# Patient Record
Sex: FEMALE | Race: WHITE | Employment: FULL TIME | URBAN - METROPOLITAN AREA
[De-identification: names, ages, dates, MRNs, and addresses within clinical notes are randomized per-mention and may not be internally consistent; named-entity substitution may affect disease eponyms.]

---

## 2020-07-07 ENCOUNTER — OFFICE VISIT (OUTPATIENT)
Dept: ORTHOPEDIC SURGERY | Age: 50
End: 2020-07-07
Payer: COMMERCIAL

## 2020-07-07 VITALS
DIASTOLIC BLOOD PRESSURE: 84 MMHG | TEMPERATURE: 98.6 F | HEIGHT: 64 IN | BODY MASS INDEX: 38.93 KG/M2 | SYSTOLIC BLOOD PRESSURE: 130 MMHG | WEIGHT: 228 LBS | HEART RATE: 88 BPM

## 2020-07-07 PROCEDURE — 99203 OFFICE O/P NEW LOW 30 MIN: CPT | Performed by: ORTHOPAEDIC SURGERY

## 2020-07-07 RX ORDER — VIT C/B6/B5/MAGNESIUM/HERB 173 50-5-6-5MG
CAPSULE ORAL
COMMUNITY
End: 2021-02-26 | Stop reason: ALTCHOICE

## 2020-07-07 RX ORDER — CYCLOSPORINE 0.5 MG/ML
1 EMULSION OPHTHALMIC 2 TIMES DAILY
COMMUNITY

## 2020-07-07 RX ORDER — LISINOPRIL 20 MG/1
20 TABLET ORAL DAILY
COMMUNITY
End: 2020-08-07

## 2020-07-07 RX ORDER — LANOLIN ALCOHOL/MO/W.PET/CERES
1000 CREAM (GRAM) TOPICAL DAILY
COMMUNITY

## 2020-07-07 RX ORDER — GABAPENTIN 400 MG/1
400 CAPSULE ORAL 3 TIMES DAILY
COMMUNITY

## 2020-07-07 RX ORDER — DULOXETIN HYDROCHLORIDE 60 MG/1
60 CAPSULE, DELAYED RELEASE ORAL DAILY
COMMUNITY
End: 2020-08-07

## 2020-07-07 RX ORDER — ACYCLOVIR 400 MG/1
400 TABLET ORAL
COMMUNITY
End: 2020-08-07 | Stop reason: DRUGHIGH

## 2020-07-07 RX ORDER — FLUTICASONE PROPIONATE 50 MCG
1 SPRAY, SUSPENSION (ML) NASAL DAILY
Status: ON HOLD | COMMUNITY
End: 2020-08-11 | Stop reason: HOSPADM

## 2020-07-07 RX ORDER — TIZANIDINE 4 MG/1
4 TABLET ORAL EVERY 6 HOURS PRN
Status: ON HOLD | COMMUNITY
End: 2020-08-11 | Stop reason: SDUPTHER

## 2020-07-07 RX ORDER — ASPIRIN 81 MG/1
81 TABLET ORAL DAILY
Status: ON HOLD | COMMUNITY
End: 2020-08-11 | Stop reason: HOSPADM

## 2020-07-07 RX ORDER — MELOXICAM 15 MG/1
15 TABLET ORAL DAILY
Status: ON HOLD | COMMUNITY
End: 2021-03-02 | Stop reason: HOSPADM

## 2020-07-07 RX ORDER — CETIRIZINE HYDROCHLORIDE 10 MG/1
10 TABLET ORAL DAILY
COMMUNITY

## 2020-07-07 RX ORDER — OMEPRAZOLE 20 MG/1
20 CAPSULE, DELAYED RELEASE ORAL DAILY
COMMUNITY

## 2020-07-07 ASSESSMENT — ENCOUNTER SYMPTOMS: BACK PAIN: 1

## 2020-07-07 NOTE — PROGRESS NOTES
Patient Name: Clyde Partida  : 1970  DOS: 2020        Chief Complaint:   Chief Complaint   Patient presents with    New Patient     right hip       History of Present Illness:  Clyde Partida is a 45 y.o. female who presents with a chief complaint of right hip pain. Pain in the back with chronic use of stimulator. Previous injection and conservative management for the hip from 2 years ago to present. Medical History  Current Medications:   Prior to Admission medications    Medication Sig Start Date End Date Taking? Authorizing Provider   acyclovir (ZOVIRAX) 400 MG tablet Take 400 mg by mouth every 4 hours (while awake)   Yes Historical Provider, MD   vitamin B-12 (CYANOCOBALAMIN) 1000 MCG tablet Take 1,000 mcg by mouth daily   Yes Historical Provider, MD   gabapentin (NEURONTIN) 400 MG capsule Take 400 mg by mouth 3 times daily.    Yes Historical Provider, MD   tiZANidine (ZANAFLEX) 4 MG tablet Take 4 mg by mouth every 6 hours as needed   Yes Historical Provider, MD   lisinopril (PRINIVIL;ZESTRIL) 20 MG tablet Take 20 mg by mouth daily   Yes Historical Provider, MD   meloxicam (MOBIC) 15 MG tablet Take 15 mg by mouth daily   Yes Historical Provider, MD   omeprazole (PRILOSEC) 20 MG delayed release capsule Take 20 mg by mouth daily   Yes Historical Provider, MD   Turmeric 500 MG CAPS Take by mouth   Yes Historical Provider, MD   DULoxetine (CYMBALTA) 60 MG extended release capsule Take 60 mg by mouth daily   Yes Historical Provider, MD   cetirizine (ZYRTEC) 10 MG tablet Take 10 mg by mouth daily   Yes Historical Provider, MD   aspirin 81 MG EC tablet Take 81 mg by mouth daily   Yes Historical Provider, MD   Glucosamine-Chondroit-Vit C-Mn (GLUCOSAMINE 1500 COMPLEX PO) Take by mouth   Yes Historical Provider, MD   cycloSPORINE (RESTASIS) 0.05 % ophthalmic emulsion 1 drop 2 times daily   Yes Historical Provider, MD   fluticasone (FLONASE) 50 MCG/ACT nasal spray 1 spray by Each Nostril file    Stress: Not on file   Relationships    Social connections     Talks on phone: Not on file     Gets together: Not on file     Attends Nondenominational service: Not on file     Active member of club or organization: Not on file     Attends meetings of clubs or organizations: Not on file     Relationship status: Not on file    Intimate partner violence     Fear of current or ex partner: Not on file     Emotionally abused: Not on file     Physically abused: Not on file     Forced sexual activity: Not on file   Other Topics Concern    Not on file   Social History Narrative    Not on file     No family history on file. Review of Systems:   Review of Systems   Constitutional: Negative for chills and fever. HENT: Negative for nosebleeds. Eyes: Negative for double vision. Cardiovascular: Negative for chest pain. Gastrointestinal: Negative for abdominal pain. Musculoskeletal: Positive for joint pain and myalgias. Skin: Negative for rash. Neurological: Negative for seizures. Psychiatric/Behavioral: Negative for hallucinations. All other systems reviewed and negative     Assessment   Vital Signs:   /84   Pulse 88   Temp 98.6 °F (37 °C) (Infrared)   Ht 5' 4\" (1.626 m)   Wt 228 lb (103.4 kg)   BMI 39.14 kg/m²     Physical Exam   Constitutional: Patient is oriented to person, place, and time and well-developed, well-nourished, and in no distress. HENT:   Head: Normocephalic and atraumatic. Eyes: Pupils are equal, round, and reactive to light. Neck: No tracheal deviation present. No thyromegaly present. Pulmonary/Chest: Effort normal.   Abdominal: Soft. There is no guarding. Musculoskeletal: Patient exhibits tenderness and pain. Neurological: Patient is alert and oriented to person, place, and time. Skin: Skin is warm. Psychiatric: Affect normal.       Right Hip Examination    Inspection: There is no swelling or ecchymosis. There is no obvious deformity.     Palpation: There is tenderness over the greater trochanter. There is anterior groin tenderness. Range of Motion: NFDL508 ER10  IR-5    Strength: Hip flexors rated: 4/5. Special Tests: Trendelenberg sign: positive       Skin: There are no rashes, ulcerations or lesions. Gait: Patient walks with a limp. Skin shows no rashes/ecchymosis to the affected area, no hyperesthesias, no discoloration, no temperature or color discrepancies. NEUROLOGICALLY: There is no evidence for sensory or motor deficits in the extremity. Coordination appears full with no spacticity or rigidity. Reflexes appear to be symmetric. Distal circulation intact. No signs of RSD. Additional Comments:     Diagnostic Test Findings: right hip with djd and progressive wear. djd of the low back at L4-5 and L5-S1. Procedure(s): none    Assessment and Plan:  1. Primary osteoarthritis of right hip        No follow-ups on file. The orders below, if any, were placed during this visit:   Orders Placed This Encounter   Procedures    Ambulatory referral to Physical Therapy     Referral Priority:   Routine     Referral Type:   Eval and Treat     Requested Specialty:   Physical Therapy     Number of Visits Requested:   1       Impression:   Because he has such persistent pain and poor function currently, he does not wish to continue living in this way. Total Hip Arthroplasty has been recommended. All potential risks, the duration of hospitalization and rehab have been discussed. Surgery will be scheduled after medical clearance has been received. Plan for potential surgery once medically cleared.              Keira Meraz MD

## 2020-07-07 NOTE — PROGRESS NOTES
Review of Systems   Constitutional: Positive for unexpected weight change. HENT:        Eye or hearing impairment, sinus or throat trouble, thyroid disease    Cardiovascular:        High blood pressure    Genitourinary:        Loss of urine / incontinence    Musculoskeletal: Positive for back pain and neck pain. Neurological:        Chronic pain   Psychiatric/Behavioral:        Depression or other problems    All other systems reviewed and are negative.

## 2020-07-17 ENCOUNTER — TELEPHONE (OUTPATIENT)
Dept: ORTHOPEDIC SURGERY | Age: 50
End: 2020-07-17

## 2020-08-04 ENCOUNTER — TELEPHONE (OUTPATIENT)
Dept: ORTHOPEDIC SURGERY | Age: 50
End: 2020-08-04

## 2020-08-05 ENCOUNTER — OFFICE VISIT (OUTPATIENT)
Dept: PRIMARY CARE CLINIC | Age: 50
End: 2020-08-05

## 2020-08-05 ENCOUNTER — TELEPHONE (OUTPATIENT)
Dept: ORTHOPEDIC SURGERY | Age: 50
End: 2020-08-05

## 2020-08-05 NOTE — PATIENT INSTRUCTIONS

## 2020-08-05 NOTE — PROGRESS NOTES
Alexsander Connors received a viral test for COVID-19. They were educated on isolation and quarantine as appropriate. For any symptoms, they were directed to seek care from their PCP, given contact information to establish with a doctor, directed to an urgent care or the emergency room.

## 2020-08-05 NOTE — PROGRESS NOTES
order may or may not be in effect during this procedure. I give my doctor permission to give me blood or blood products. I understand that there are risks with receiving blood such as hepatitis, AIDS, fever, or allergic reaction. I acknowledge that the risks, benefits, and alternatives of this treatment have been explained to me and that no express or implied warranty has been given by the hospital, any blood bank, or any person or entity as to the blood or blood components transfused. At the discretion of my doctor, I agree to allow observers, equipment/product representatives and allow photographing, and/or televising of the procedure, provided my name or identity is maintained confidentially. I agree the hospital may dispose of or use for scientific or educational purposes any tissue, fluid, or body parts which may be removed.     ________________________________Date________Time______ am/pm  ([facility])  Patient or Signature of Closest Relative or Legal Guardian    ________________________________Date________Time______am/pm      Page 1 of  1  Witness

## 2020-08-06 ENCOUNTER — TELEPHONE (OUTPATIENT)
Dept: ORTHOPEDIC SURGERY | Age: 50
End: 2020-08-06

## 2020-08-06 LAB — SARS-COV-2, NAA: NOT DETECTED

## 2020-08-06 NOTE — RESULT ENCOUNTER NOTE
Your test for COVID-19, also known as novel coronavirus, came back negative. No virus was detected from the sample collected. Testing is not 100%. Until your symptoms are fully resolved, you may still be contagious. We recommend that you remain isolated for 7 days minimum or 72 hours after your symptoms have completely resolved, whichever is longer. If you were exposed to a known positive COVID-19 patient, then you must remain isolated for 14 days. If you were tested for a pre-op, then you remain in isolated until your procedure. Continually monitor symptoms. Contact a medical provider if symptoms are worsening. If you have any additional questions, contact your PCP.     For additional information, please visit the Centers for Disease Control and Prevention [employer].cy

## 2020-08-07 ENCOUNTER — ANESTHESIA EVENT (OUTPATIENT)
Dept: OPERATING ROOM | Age: 50
End: 2020-08-07
Payer: COMMERCIAL

## 2020-08-07 RX ORDER — NITROFURANTOIN 25; 75 MG/1; MG/1
100 CAPSULE ORAL 2 TIMES DAILY
COMMUNITY
End: 2021-02-26 | Stop reason: ALTCHOICE

## 2020-08-07 NOTE — PROGRESS NOTES
Snoring? Do you snore loudly (loud enough to be heard through closed doors, or your bed partner elbows you for snoring at night)? No    Tired? Do you often feel tired, fatigued, or sleepy during the daytime (such as falling asleep during driving)? No    Observed? Has anyone observed you stop breathing or choking/gasping during your sleep? No    Pressure? Do you have or are being treated for high blood pressure? Yes    Neck Size? (measured around Nates apple)  For male, is your shirt collar 17 inches or larger? For female, is your shirt collar 16 inches or larger? No    Age older than 48years old? Yes    Gender = Male  No    Body Mass Index more than 35 kg/m2?   Yes    Risk of RIVERA Scoring criteria:    [x] Low risk:  Yes to 0 - 2 questions    [] Intermediate risk:  Yes to 3 - 4 questions    [] High risk:  Yes to 5 - 8 questions

## 2020-08-07 NOTE — PROGRESS NOTES
[facility] PRE-SURGICAL TESTING INSTRUCTIONS                              PRIOR TO PROCEDURE DATE:  1. Please follow any guidelines/instructions prior to your procedure as advised by your surgeon. 2. Arrange for someone to drive you home and be with you for the first 24 hours after discharge for your safety after your procedure for which you received sedation. Ensure it is someone we can share information with regarding your discharge. 3. You must contact your surgeon for instructions IF:   You are taking any blood thinners, aspirin, anti-inflammatory or vitamin E.   There is a change in your physical condition such as a cold, fever, rash, cuts, sores or any other infection, especially near your surgical site. 4. Do not drink alcohol the day before or day of your procedure. 5. A Pre-op History and Physical for surgery MUST be completed by your Physician or Urgent Care within 30 days of your procedure date. Please bring a copy with you on the day of your procedure and along with any other testing performed. THE DAY OF YOUR PROCEDURE:  1. Follow instructions for ARRIVAL TIME as DIRECTED BY YOUR SURGEON. I    2. Enter the MAIN entrance from [employer] and follow the signs to the free [employer] or [employer] parking (offered free of charge 6am-5pm). 3. Enter the Main Entrance of the hospital (do not enter from the lower level of the parking garage). Upon entrance, check in with the  at the main desk on your left. If no one is available at the desk, proceed into the [facility] Waiting Room and go through the door directly into the [facility]. There is a Check-in desk ACROSS from Room 5 (marked with a sign hanging from the ceiling). The phone number for the surgery center is [phone]. 4. Please call [phone] option #2 option #2 if you have not been preregistered yet. On the day of your procedure bring your insurance card and photo ID.  You will be registered at your bedside once brought back to your room. 5. DO NOT EAT ANYTHING eight hours prior to surgery. May have 8 ounces of water 4 hours prior to surgery. 6. MEDICATIONS    Take the following medications with a SMALL sip of water: omeprazole   Use your usual dose of inhalers the morning of surgery. BRING your rescue inhaler with you to hospital.    Anesthesia does NOT want you to take insulin the morning of surgery. They will control your blood sugar while you are at the hospital. Please contact your ordering physician for instructions regarding your insulin the night before your procedure. If you have an insulin pump, please keep it set on basal rate. 7. Do not swallow water when brushing teeth. No gum, candy, mints or ice chips. Refrain from smoking or at least decrease the amount. 8. Dress in loose, comfortable clothing appropriate for redressing after your procedure. Do not wear jewelry (including body piercings), make-up (especially NO eye make-up), fingernail polish (NO toenail polish if foot/leg surgery), lotion, powders or metal hairclips. 9. Dentures, glasses, or contacts will need to be removed before your procedure. Bring cases for your glasses, contacts, dentures, or hearing aids to protect them while you are in surgery. 10. If you use a CPAP, please bring it with you on the day of your procedure. 11. We recommend that valuable personal  belongings such as cash, cell phones, e-tablets or jewelry, be left at home during your stay. The hospital will not be responsible for valuables that are not secured in the hospital safe. However, if your insurance requires a co-pay, you may want to bring a method of payment, i.e. Check or credit card, if you wish to pay your co-pay the day of surgery. 12. If you are to stay overnight, you may bring a bag with personal items.  Please have any large items you may need brought in by your family after your arrival to your hospital room.    13. If you have a Living Will or Durable Power of , please bring a copy on the day of your procedure. 15. With your permission, one family member may accompany you while you are being prepared for surgery. Once you are ready, additional family members may join you. HOW WE KEEP YOU SAFE and WORK TO PREVENT SURGICAL SITE INFECTIONS:  1. Health care workers should always check your ID bracelet to verify your name and birth date. You will be asked many times to state your name, date of birth, and allergies. 2. Health care workers should always clean their hands with soap or alcohol gel before providing care to you. It is okay to ask anyone if they cleaned their hands before they touch you. 3. You will be actively involved in verifying the type of procedure you are having and ensuring the correct surgical site. This will be confirmed multiple times prior to your procedure. Do NOT radha your surgery site UNLESS instructed to by your surgeon. 4. Do not shave or wax for 72 hours prior to procedure near your operative site. Shaving with a razor can irritate your skin and make it easier to develop an infection. On the day of your procedure, any hair that needs to be removed near the surgical site will be clipped by a healthcare worker using a special clippers designed to avoid skin irritation. 5. When you are in the operating room, your surgical site will be cleansed with a special soap, and in most cases, you will be given an antibiotic before the surgery begins. What to expect AFTER YOUR PROCEDURE:  1. Immediately following your procedure, your will be taken to the PACU for the first phase of your recovery. Your nurse will help you recover from any potential side effects of anesthesia, such as extreme drowsiness, changes in your vital signs or breathing patterns. Nausea, headache, muscle aches, or sore throat may also occur after anesthesia.   Your nurse will help you manage these potential side effects. 2. For comfort and safety, arrange to have someone at home with you for the first 24 hours after discharge. 3. You and your family will be given written instructions about your diet, activity, dressing care, medications, and return visits. 4. Once at home, should issues with nausea, pain, or bleeding occur, or should you notice any signs of infection, you should call your surgeon. 5. Always clean your hands before and after caring for your wound. Do not let your family touch your surgery site without cleaning their hands. 6. Narcotic pain medications can cause significant constipation. You may want to add a stool softener to your postoperative medication schedule or speak to your surgeon on how best to manage this SIDE EFFECT. SPECIAL INSTRUCTIONS   Thank you for allowing us to care for you. We strive to exceed your expectations in the delivery of care and service provided to you and your family. If you need to contact us for any reason, please call us at [phone]    Instructions reviewed with patient during preadmission testing phone interview. Tomeka Sanchez. 8/7/2020 .11:16 AM      ADDITIONAL EDUCATIONAL INFORMATION REVIEWED PER PHONE WITH YOU AND/OR YOUR FAMILY:  Yes Bring a urine sample on day of surgery  Hibiclens

## 2020-08-10 ENCOUNTER — APPOINTMENT (OUTPATIENT)
Dept: GENERAL RADIOLOGY | Age: 50
End: 2020-08-10
Attending: ORTHOPAEDIC SURGERY
Payer: COMMERCIAL

## 2020-08-10 ENCOUNTER — HOSPITAL ENCOUNTER (OUTPATIENT)
Age: 50
Setting detail: OBSERVATION
Discharge: HOME | End: 2020-08-11
Attending: ORTHOPAEDIC SURGERY | Admitting: ORTHOPAEDIC SURGERY
Payer: COMMERCIAL

## 2020-08-10 ENCOUNTER — ANESTHESIA (OUTPATIENT)
Dept: OPERATING ROOM | Age: 50
End: 2020-08-10
Payer: COMMERCIAL

## 2020-08-10 VITALS — DIASTOLIC BLOOD PRESSURE: 117 MMHG | TEMPERATURE: 70 F | SYSTOLIC BLOOD PRESSURE: 184 MMHG | OXYGEN SATURATION: 95 %

## 2020-08-10 PROBLEM — Z96.641 STATUS POST TOTAL HIP REPLACEMENT, RIGHT: Status: ACTIVE | Noted: 2020-08-10

## 2020-08-10 LAB
ABO/RH: NORMAL
ANTIBODY SCREEN: NORMAL
APTT: 34.4 SEC (ref 24.2–36.2)
BASOPHILS ABSOLUTE: 0.1 K/UL (ref 0–0.2)
BASOPHILS RELATIVE PERCENT: 1 %
C-REACTIVE PROTEIN: 6.4 MG/L (ref 0–5.1)
EOSINOPHILS ABSOLUTE: 0.1 K/UL (ref 0–0.6)
EOSINOPHILS RELATIVE PERCENT: 2 %
HCT VFR BLD CALC: 43.9 % (ref 36–48)
HEMOGLOBIN: 14.9 G/DL (ref 12–16)
LYMPHOCYTES ABSOLUTE: 1.4 K/UL (ref 1–5.1)
LYMPHOCYTES RELATIVE PERCENT: 22 %
MCH RBC QN AUTO: 32.7 PG (ref 26–34)
MCHC RBC AUTO-ENTMCNC: 33.9 G/DL (ref 31–36)
MCV RBC AUTO: 96.5 FL (ref 80–100)
MONOCYTES ABSOLUTE: 0.5 K/UL (ref 0–1.3)
MONOCYTES RELATIVE PERCENT: 8.2 %
NEUTROPHILS ABSOLUTE: 4.4 K/UL (ref 1.7–7.7)
NEUTROPHILS RELATIVE PERCENT: 66.8 %
PDW BLD-RTO: 14.9 % (ref 12.4–15.4)
PLATELET # BLD: 325 K/UL (ref 135–450)
PMV BLD AUTO: 8 FL (ref 5–10.5)
PREGNANCY, URINE: NEGATIVE
RBC # BLD: 4.54 M/UL (ref 4–5.2)
SEDIMENTATION RATE, ERYTHROCYTE: 32 MM/HR (ref 0–30)
WBC # BLD: 6.6 K/UL (ref 4–11)

## 2020-08-10 PROCEDURE — 86901 BLOOD TYPING SEROLOGIC RH(D): CPT

## 2020-08-10 PROCEDURE — 85652 RBC SED RATE AUTOMATED: CPT

## 2020-08-10 PROCEDURE — 85025 COMPLETE CBC W/AUTO DIFF WBC: CPT

## 2020-08-10 PROCEDURE — 6360000002 HC RX W HCPCS: Performed by: NURSE ANESTHETIST, CERTIFIED REGISTERED

## 2020-08-10 PROCEDURE — 7100000000 HC PACU RECOVERY - FIRST 15 MIN: Performed by: ORTHOPAEDIC SURGERY

## 2020-08-10 PROCEDURE — 6360000002 HC RX W HCPCS: Performed by: ANESTHESIOLOGY

## 2020-08-10 PROCEDURE — 86850 RBC ANTIBODY SCREEN: CPT

## 2020-08-10 PROCEDURE — 3700000001 HC ADD 15 MINUTES (ANESTHESIA): Performed by: ORTHOPAEDIC SURGERY

## 2020-08-10 PROCEDURE — 27130 TOTAL HIP ARTHROPLASTY: CPT | Performed by: ORTHOPAEDIC SURGERY

## 2020-08-10 PROCEDURE — 86900 BLOOD TYPING SEROLOGIC ABO: CPT

## 2020-08-10 PROCEDURE — 3700000000 HC ANESTHESIA ATTENDED CARE: Performed by: ORTHOPAEDIC SURGERY

## 2020-08-10 PROCEDURE — 7100000001 HC PACU RECOVERY - ADDTL 15 MIN: Performed by: ORTHOPAEDIC SURGERY

## 2020-08-10 PROCEDURE — 86140 C-REACTIVE PROTEIN: CPT

## 2020-08-10 PROCEDURE — 2500000003 HC RX 250 WO HCPCS: Performed by: NURSE ANESTHETIST, CERTIFIED REGISTERED

## 2020-08-10 PROCEDURE — 3600000005 HC SURGERY LEVEL 5 BASE: Performed by: ORTHOPAEDIC SURGERY

## 2020-08-10 PROCEDURE — 6370000000 HC RX 637 (ALT 250 FOR IP): Performed by: ORTHOPAEDIC SURGERY

## 2020-08-10 PROCEDURE — 2580000003 HC RX 258: Performed by: NURSE ANESTHETIST, CERTIFIED REGISTERED

## 2020-08-10 PROCEDURE — 6360000002 HC RX W HCPCS: Performed by: ORTHOPAEDIC SURGERY

## 2020-08-10 PROCEDURE — 2709999900 HC NON-CHARGEABLE SUPPLY: Performed by: ORTHOPAEDIC SURGERY

## 2020-08-10 PROCEDURE — 2580000003 HC RX 258: Performed by: ORTHOPAEDIC SURGERY

## 2020-08-10 PROCEDURE — 3209999900 FLUORO FOR SURGICAL PROCEDURES

## 2020-08-10 PROCEDURE — 2580000003 HC RX 258: Performed by: ANESTHESIOLOGY

## 2020-08-10 PROCEDURE — 84703 CHORIONIC GONADOTROPIN ASSAY: CPT

## 2020-08-10 PROCEDURE — 64450 NJX AA&/STRD OTHER PN/BRANCH: CPT | Performed by: ANESTHESIOLOGY

## 2020-08-10 PROCEDURE — G0378 HOSPITAL OBSERVATION PER HR: HCPCS

## 2020-08-10 PROCEDURE — 2720000010 HC SURG SUPPLY STERILE: Performed by: ORTHOPAEDIC SURGERY

## 2020-08-10 PROCEDURE — C1776 JOINT DEVICE (IMPLANTABLE): HCPCS | Performed by: ORTHOPAEDIC SURGERY

## 2020-08-10 PROCEDURE — 3600000015 HC SURGERY LEVEL 5 ADDTL 15MIN: Performed by: ORTHOPAEDIC SURGERY

## 2020-08-10 PROCEDURE — 2500000003 HC RX 250 WO HCPCS: Performed by: ORTHOPAEDIC SURGERY

## 2020-08-10 PROCEDURE — 2580000003 HC RX 258: Performed by: PHYSICIAN ASSISTANT

## 2020-08-10 PROCEDURE — 85730 THROMBOPLASTIN TIME PARTIAL: CPT

## 2020-08-10 PROCEDURE — 73502 X-RAY EXAM HIP UNI 2-3 VIEWS: CPT

## 2020-08-10 PROCEDURE — 6370000000 HC RX 637 (ALT 250 FOR IP): Performed by: PHYSICIAN ASSISTANT

## 2020-08-10 DEVICE — R3 3 HOLE ACETABULAR SHELL 50MM
Type: IMPLANTABLE DEVICE | Site: HIP | Status: FUNCTIONAL
Brand: R3 ACETABULAR

## 2020-08-10 DEVICE — R3 20 DEGREE XLPE ACETABULAR LINER                                    32MM INNER DIAMETER X OUTER DIAMETER 50MM
Type: IMPLANTABLE DEVICE | Site: HIP | Status: FUNCTIONAL
Brand: R3

## 2020-08-10 DEVICE — REFLECTION SPHERICAL HEAD SCREW 30MM
Type: IMPLANTABLE DEVICE | Site: HIP | Status: FUNCTIONAL
Brand: REFLECTION

## 2020-08-10 DEVICE — OXINIUM FEMORAL HEAD 12/14 TAPER                                    32MM +0
Type: IMPLANTABLE DEVICE | Site: HIP | Status: FUNCTIONAL
Brand: OXINIUM

## 2020-08-10 DEVICE — REFLECTION SPHERICAL HEAD SCREW 35MM
Type: IMPLANTABLE DEVICE | Site: HIP | Status: FUNCTIONAL
Brand: REFLECTION

## 2020-08-10 DEVICE — HIP H2 TOT ADV OTHER HD IMPL CAPPED H2 SN: Type: IMPLANTABLE DEVICE | Site: HIP | Status: FUNCTIONAL

## 2020-08-10 DEVICE — R3 20 DEGREE +4 XLPE ACETABULAR                                    LINER 32MM INNER DIAMETER X OUTER                                    DIAMETER 50MM
Type: IMPLANTABLE DEVICE | Site: HIP | Status: FUNCTIONAL
Brand: R3

## 2020-08-10 DEVICE — ANTHOLOGY HIGH OFFSET POROUS PLUS                                    HA SIZE 5
Type: IMPLANTABLE DEVICE | Site: HIP | Status: FUNCTIONAL
Brand: ANTHOLOGY

## 2020-08-10 RX ORDER — ACYCLOVIR 200 MG/1
400 CAPSULE ORAL DAILY
Status: DISCONTINUED | OUTPATIENT
Start: 2020-08-11 | End: 2020-08-11 | Stop reason: HOSPADM

## 2020-08-10 RX ORDER — HYDROCHLOROTHIAZIDE 25 MG/1
25 TABLET ORAL DAILY
Status: DISCONTINUED | OUTPATIENT
Start: 2020-08-11 | End: 2020-08-11 | Stop reason: HOSPADM

## 2020-08-10 RX ORDER — OXYCODONE HYDROCHLORIDE 5 MG/1
10 TABLET ORAL EVERY 4 HOURS PRN
Status: DISCONTINUED | OUTPATIENT
Start: 2020-08-10 | End: 2020-08-11 | Stop reason: HOSPADM

## 2020-08-10 RX ORDER — DEXAMETHASONE SODIUM PHOSPHATE 4 MG/ML
4 INJECTION, SOLUTION INTRA-ARTICULAR; INTRALESIONAL; INTRAMUSCULAR; INTRAVENOUS; SOFT TISSUE
Status: DISCONTINUED | OUTPATIENT
Start: 2020-08-10 | End: 2020-08-10 | Stop reason: HOSPADM

## 2020-08-10 RX ORDER — PREDNISONE 10 MG/1
10 TABLET ORAL DAILY
Status: DISCONTINUED | OUTPATIENT
Start: 2020-08-11 | End: 2020-08-11 | Stop reason: HOSPADM

## 2020-08-10 RX ORDER — NITROFURANTOIN 25; 75 MG/1; MG/1
100 CAPSULE ORAL 2 TIMES DAILY
Status: DISCONTINUED | OUTPATIENT
Start: 2020-08-10 | End: 2020-08-11 | Stop reason: HOSPADM

## 2020-08-10 RX ORDER — HYDROMORPHONE HCL 110MG/55ML
PATIENT CONTROLLED ANALGESIA SYRINGE INTRAVENOUS PRN
Status: DISCONTINUED | OUTPATIENT
Start: 2020-08-10 | End: 2020-08-10 | Stop reason: SDUPTHER

## 2020-08-10 RX ORDER — OXYCODONE HCL 10 MG/1
20 TABLET, FILM COATED, EXTENDED RELEASE ORAL ONCE
Status: COMPLETED | OUTPATIENT
Start: 2020-08-10 | End: 2020-08-10

## 2020-08-10 RX ORDER — TIZANIDINE 4 MG/1
4 TABLET ORAL EVERY 6 HOURS PRN
Status: DISCONTINUED | OUTPATIENT
Start: 2020-08-10 | End: 2020-08-11 | Stop reason: HOSPADM

## 2020-08-10 RX ORDER — GLYCOPYRROLATE 1 MG/5 ML
SYRINGE (ML) INTRAVENOUS PRN
Status: DISCONTINUED | OUTPATIENT
Start: 2020-08-10 | End: 2020-08-10 | Stop reason: SDUPTHER

## 2020-08-10 RX ORDER — ACETAMINOPHEN 500 MG
1000 TABLET ORAL ONCE
Status: COMPLETED | OUTPATIENT
Start: 2020-08-10 | End: 2020-08-10

## 2020-08-10 RX ORDER — VANCOMYCIN HYDROCHLORIDE 1 G/20ML
INJECTION, POWDER, LYOPHILIZED, FOR SOLUTION INTRAVENOUS PRN
Status: DISCONTINUED | OUTPATIENT
Start: 2020-08-10 | End: 2020-08-10 | Stop reason: HOSPADM

## 2020-08-10 RX ORDER — MAGNESIUM HYDROXIDE 1200 MG/15ML
LIQUID ORAL CONTINUOUS PRN
Status: COMPLETED | OUTPATIENT
Start: 2020-08-10 | End: 2020-08-10

## 2020-08-10 RX ORDER — SODIUM CHLORIDE, SODIUM LACTATE, POTASSIUM CHLORIDE, CALCIUM CHLORIDE 600; 310; 30; 20 MG/100ML; MG/100ML; MG/100ML; MG/100ML
INJECTION, SOLUTION INTRAVENOUS CONTINUOUS
Status: DISCONTINUED | OUTPATIENT
Start: 2020-08-10 | End: 2020-08-10

## 2020-08-10 RX ORDER — PROMETHAZINE HYDROCHLORIDE 12.5 MG/1
12.5 TABLET ORAL EVERY 6 HOURS PRN
Status: DISCONTINUED | OUTPATIENT
Start: 2020-08-10 | End: 2020-08-11 | Stop reason: HOSPADM

## 2020-08-10 RX ORDER — SODIUM CHLORIDE 0.9 % (FLUSH) 0.9 %
10 SYRINGE (ML) INJECTION PRN
Status: DISCONTINUED | OUTPATIENT
Start: 2020-08-10 | End: 2020-08-11 | Stop reason: HOSPADM

## 2020-08-10 RX ORDER — PREGABALIN 150 MG/1
150 CAPSULE ORAL ONCE
Status: COMPLETED | OUTPATIENT
Start: 2020-08-10 | End: 2020-08-10

## 2020-08-10 RX ORDER — LISINOPRIL AND HYDROCHLOROTHIAZIDE 25; 20 MG/1; MG/1
1 TABLET ORAL DAILY
Status: DISCONTINUED | OUTPATIENT
Start: 2020-08-11 | End: 2020-08-10 | Stop reason: SDUPTHER

## 2020-08-10 RX ORDER — ZOLPIDEM TARTRATE 5 MG/1
5 TABLET ORAL NIGHTLY PRN
Status: DISCONTINUED | OUTPATIENT
Start: 2020-08-10 | End: 2020-08-11 | Stop reason: HOSPADM

## 2020-08-10 RX ORDER — GABAPENTIN 400 MG/1
400 CAPSULE ORAL 3 TIMES DAILY
Status: DISCONTINUED | OUTPATIENT
Start: 2020-08-10 | End: 2020-08-11 | Stop reason: HOSPADM

## 2020-08-10 RX ORDER — POLYETHYLENE GLYCOL 3350 17 G/17G
17 POWDER, FOR SOLUTION ORAL DAILY
Status: DISCONTINUED | OUTPATIENT
Start: 2020-08-11 | End: 2020-08-11 | Stop reason: HOSPADM

## 2020-08-10 RX ORDER — ROPIVACAINE HYDROCHLORIDE 5 MG/ML
INJECTION, SOLUTION EPIDURAL; INFILTRATION; PERINEURAL
Status: DISCONTINUED | OUTPATIENT
Start: 2020-08-10 | End: 2020-08-10 | Stop reason: SDUPTHER

## 2020-08-10 RX ORDER — SODIUM CHLORIDE, SODIUM LACTATE, POTASSIUM CHLORIDE, CALCIUM CHLORIDE 600; 310; 30; 20 MG/100ML; MG/100ML; MG/100ML; MG/100ML
INJECTION, SOLUTION INTRAVENOUS CONTINUOUS PRN
Status: DISCONTINUED | OUTPATIENT
Start: 2020-08-10 | End: 2020-08-10 | Stop reason: SDUPTHER

## 2020-08-10 RX ORDER — ONDANSETRON 2 MG/ML
4 INJECTION INTRAMUSCULAR; INTRAVENOUS
Status: DISCONTINUED | OUTPATIENT
Start: 2020-08-10 | End: 2020-08-10 | Stop reason: HOSPADM

## 2020-08-10 RX ORDER — FENTANYL CITRATE 50 UG/ML
25 INJECTION, SOLUTION INTRAMUSCULAR; INTRAVENOUS EVERY 5 MIN PRN
Status: DISCONTINUED | OUTPATIENT
Start: 2020-08-10 | End: 2020-08-10 | Stop reason: HOSPADM

## 2020-08-10 RX ORDER — DEXAMETHASONE SODIUM PHOSPHATE 4 MG/ML
10 INJECTION, SOLUTION INTRA-ARTICULAR; INTRALESIONAL; INTRAMUSCULAR; INTRAVENOUS; SOFT TISSUE ONCE
Status: DISCONTINUED | OUTPATIENT
Start: 2020-08-10 | End: 2020-08-11 | Stop reason: HOSPADM

## 2020-08-10 RX ORDER — SODIUM CHLORIDE 0.9 % (FLUSH) 0.9 %
10 SYRINGE (ML) INJECTION EVERY 12 HOURS SCHEDULED
Status: DISCONTINUED | OUTPATIENT
Start: 2020-08-10 | End: 2020-08-11 | Stop reason: HOSPADM

## 2020-08-10 RX ORDER — LISINOPRIL 20 MG/1
20 TABLET ORAL DAILY
Status: DISCONTINUED | OUTPATIENT
Start: 2020-08-11 | End: 2020-08-11 | Stop reason: HOSPADM

## 2020-08-10 RX ORDER — OXYCODONE HYDROCHLORIDE 5 MG/1
5 TABLET ORAL EVERY 4 HOURS PRN
Status: DISCONTINUED | OUTPATIENT
Start: 2020-08-10 | End: 2020-08-11 | Stop reason: HOSPADM

## 2020-08-10 RX ORDER — LANOLIN ALCOHOL/MO/W.PET/CERES
1000 CREAM (GRAM) TOPICAL DAILY
Status: DISCONTINUED | OUTPATIENT
Start: 2020-08-11 | End: 2020-08-11 | Stop reason: HOSPADM

## 2020-08-10 RX ORDER — CETIRIZINE HYDROCHLORIDE 10 MG/1
10 TABLET ORAL DAILY
Status: DISCONTINUED | OUTPATIENT
Start: 2020-08-11 | End: 2020-08-11 | Stop reason: HOSPADM

## 2020-08-10 RX ORDER — DEXAMETHASONE SODIUM PHOSPHATE 4 MG/ML
INJECTION, SOLUTION INTRA-ARTICULAR; INTRALESIONAL; INTRAMUSCULAR; INTRAVENOUS; SOFT TISSUE PRN
Status: DISCONTINUED | OUTPATIENT
Start: 2020-08-10 | End: 2020-08-10 | Stop reason: SDUPTHER

## 2020-08-10 RX ORDER — PROPOFOL 10 MG/ML
INJECTION, EMULSION INTRAVENOUS PRN
Status: DISCONTINUED | OUTPATIENT
Start: 2020-08-10 | End: 2020-08-10 | Stop reason: SDUPTHER

## 2020-08-10 RX ORDER — ONDANSETRON 2 MG/ML
4 INJECTION INTRAMUSCULAR; INTRAVENOUS EVERY 6 HOURS PRN
Status: DISCONTINUED | OUTPATIENT
Start: 2020-08-10 | End: 2020-08-11 | Stop reason: HOSPADM

## 2020-08-10 RX ORDER — SODIUM CHLORIDE 450 MG/100ML
INJECTION, SOLUTION INTRAVENOUS CONTINUOUS
Status: DISCONTINUED | OUTPATIENT
Start: 2020-08-10 | End: 2020-08-11 | Stop reason: HOSPADM

## 2020-08-10 RX ORDER — DULOXETIN HYDROCHLORIDE 60 MG/1
60 CAPSULE, DELAYED RELEASE ORAL DAILY
Status: DISCONTINUED | OUTPATIENT
Start: 2020-08-11 | End: 2020-08-11 | Stop reason: HOSPADM

## 2020-08-10 RX ORDER — PANTOPRAZOLE SODIUM 40 MG/1
40 TABLET, DELAYED RELEASE ORAL
Status: DISCONTINUED | OUTPATIENT
Start: 2020-08-11 | End: 2020-08-11 | Stop reason: HOSPADM

## 2020-08-10 RX ORDER — FLUTICASONE PROPIONATE 50 MCG
1 SPRAY, SUSPENSION (ML) NASAL DAILY
Status: DISCONTINUED | OUTPATIENT
Start: 2020-08-11 | End: 2020-08-11 | Stop reason: HOSPADM

## 2020-08-10 RX ORDER — ACETAMINOPHEN 325 MG/1
650 TABLET ORAL EVERY 6 HOURS
Status: DISCONTINUED | OUTPATIENT
Start: 2020-08-10 | End: 2020-08-11 | Stop reason: HOSPADM

## 2020-08-10 RX ORDER — CELECOXIB 200 MG/1
400 CAPSULE ORAL ONCE
Status: COMPLETED | OUTPATIENT
Start: 2020-08-10 | End: 2020-08-10

## 2020-08-10 RX ORDER — ROPIVACAINE HYDROCHLORIDE 5 MG/ML
INJECTION, SOLUTION EPIDURAL; INFILTRATION; PERINEURAL
Status: COMPLETED
Start: 2020-08-10 | End: 2020-08-10

## 2020-08-10 RX ORDER — MELOXICAM 15 MG/1
15 TABLET ORAL DAILY
Status: DISCONTINUED | OUTPATIENT
Start: 2020-08-11 | End: 2020-08-11 | Stop reason: HOSPADM

## 2020-08-10 RX ORDER — SENNA AND DOCUSATE SODIUM 50; 8.6 MG/1; MG/1
1 TABLET, FILM COATED ORAL 2 TIMES DAILY
Status: DISCONTINUED | OUTPATIENT
Start: 2020-08-10 | End: 2020-08-11 | Stop reason: HOSPADM

## 2020-08-10 RX ORDER — ONDANSETRON 2 MG/ML
INJECTION INTRAMUSCULAR; INTRAVENOUS PRN
Status: DISCONTINUED | OUTPATIENT
Start: 2020-08-10 | End: 2020-08-10 | Stop reason: SDUPTHER

## 2020-08-10 RX ORDER — CYCLOSPORINE 0.5 MG/ML
1 EMULSION OPHTHALMIC 2 TIMES DAILY
Status: DISCONTINUED | OUTPATIENT
Start: 2020-08-10 | End: 2020-08-11 | Stop reason: HOSPADM

## 2020-08-10 RX ORDER — FENTANYL CITRATE 50 UG/ML
INJECTION, SOLUTION INTRAMUSCULAR; INTRAVENOUS PRN
Status: DISCONTINUED | OUTPATIENT
Start: 2020-08-10 | End: 2020-08-10 | Stop reason: SDUPTHER

## 2020-08-10 RX ORDER — ROCURONIUM BROMIDE 10 MG/ML
INJECTION, SOLUTION INTRAVENOUS PRN
Status: DISCONTINUED | OUTPATIENT
Start: 2020-08-10 | End: 2020-08-10 | Stop reason: SDUPTHER

## 2020-08-10 RX ORDER — LIDOCAINE HYDROCHLORIDE 20 MG/ML
INJECTION, SOLUTION INTRAVENOUS PRN
Status: DISCONTINUED | OUTPATIENT
Start: 2020-08-10 | End: 2020-08-10 | Stop reason: SDUPTHER

## 2020-08-10 RX ADMIN — DEXAMETHASONE SODIUM PHOSPHATE 4 MG: 4 INJECTION, SOLUTION INTRAMUSCULAR; INTRAVENOUS at 15:45

## 2020-08-10 RX ADMIN — ACETAMINOPHEN 1000 MG: 500 TABLET ORAL at 14:02

## 2020-08-10 RX ADMIN — HYDROMORPHONE HYDROCHLORIDE 0.5 MG: 2 INJECTION, SOLUTION INTRAMUSCULAR; INTRAVENOUS; SUBCUTANEOUS at 16:03

## 2020-08-10 RX ADMIN — SODIUM CHLORIDE, POTASSIUM CHLORIDE, SODIUM LACTATE AND CALCIUM CHLORIDE: 600; 310; 30; 20 INJECTION, SOLUTION INTRAVENOUS at 14:17

## 2020-08-10 RX ADMIN — OXYCODONE 5 MG: 5 TABLET ORAL at 23:01

## 2020-08-10 RX ADMIN — SODIUM CHLORIDE, SODIUM LACTATE, POTASSIUM CHLORIDE, AND CALCIUM CHLORIDE: 600; 310; 30; 20 INJECTION, SOLUTION INTRAVENOUS at 17:56

## 2020-08-10 RX ADMIN — PROPOFOL 150 MG: 10 INJECTION, EMULSION INTRAVENOUS at 15:34

## 2020-08-10 RX ADMIN — HYDROMORPHONE HYDROCHLORIDE 0.5 MG: 2 INJECTION, SOLUTION INTRAMUSCULAR; INTRAVENOUS; SUBCUTANEOUS at 16:23

## 2020-08-10 RX ADMIN — PROPOFOL 50 MG: 10 INJECTION, EMULSION INTRAVENOUS at 15:44

## 2020-08-10 RX ADMIN — SODIUM CHLORIDE, SODIUM LACTATE, POTASSIUM CHLORIDE, AND CALCIUM CHLORIDE: 600; 310; 30; 20 INJECTION, SOLUTION INTRAVENOUS at 16:11

## 2020-08-10 RX ADMIN — ROPIVACAINE HYDROCHLORIDE 30 ML: 5 INJECTION, SOLUTION EPIDURAL; INFILTRATION; PERINEURAL at 19:23

## 2020-08-10 RX ADMIN — HYDROMORPHONE HYDROCHLORIDE 0.5 MG: 2 INJECTION, SOLUTION INTRAMUSCULAR; INTRAVENOUS; SUBCUTANEOUS at 16:55

## 2020-08-10 RX ADMIN — ACETAMINOPHEN 650 MG: 325 TABLET ORAL at 22:58

## 2020-08-10 RX ADMIN — HYDROMORPHONE HYDROCHLORIDE 0.5 MG: 2 INJECTION, SOLUTION INTRAMUSCULAR; INTRAVENOUS; SUBCUTANEOUS at 17:42

## 2020-08-10 RX ADMIN — CEFAZOLIN 2 G: 10 INJECTION, POWDER, FOR SOLUTION INTRAVENOUS at 15:28

## 2020-08-10 RX ADMIN — ONDANSETRON 4 MG: 2 INJECTION INTRAMUSCULAR; INTRAVENOUS at 17:40

## 2020-08-10 RX ADMIN — SODIUM CHLORIDE, SODIUM LACTATE, POTASSIUM CHLORIDE, AND CALCIUM CHLORIDE: 600; 310; 30; 20 INJECTION, SOLUTION INTRAVENOUS at 15:27

## 2020-08-10 RX ADMIN — HYDROMORPHONE HYDROCHLORIDE 0.5 MG: 2 INJECTION, SOLUTION INTRAMUSCULAR; INTRAVENOUS; SUBCUTANEOUS at 16:11

## 2020-08-10 RX ADMIN — OXYCODONE HYDROCHLORIDE 20 MG: 10 TABLET, FILM COATED, EXTENDED RELEASE ORAL at 14:01

## 2020-08-10 RX ADMIN — SUGAMMADEX 100 MG: 100 INJECTION, SOLUTION INTRAVENOUS at 18:01

## 2020-08-10 RX ADMIN — FENTANYL CITRATE 50 MCG: 50 INJECTION INTRAMUSCULAR; INTRAVENOUS at 15:41

## 2020-08-10 RX ADMIN — NITROFURANTOIN (MONOHYDRATE/MACROCRYSTALS) 100 MG: 75; 25 CAPSULE ORAL at 22:58

## 2020-08-10 RX ADMIN — CELECOXIB 400 MG: 200 CAPSULE ORAL at 14:01

## 2020-08-10 RX ADMIN — LIDOCAINE HYDROCHLORIDE 100 MG: 20 INJECTION, SOLUTION INTRAVENOUS at 15:34

## 2020-08-10 RX ADMIN — GABAPENTIN 400 MG: 400 CAPSULE ORAL at 22:58

## 2020-08-10 RX ADMIN — Medication 0.3 MG: at 16:46

## 2020-08-10 RX ADMIN — DOCUSATE SODIUM 50MG AND SENNOSIDES 8.6MG 1 TABLET: 8.6; 5 TABLET, FILM COATED ORAL at 22:58

## 2020-08-10 RX ADMIN — CYCLOSPORINE 1 DROP: 0.5 EMULSION OPHTHALMIC at 23:12

## 2020-08-10 RX ADMIN — ROCURONIUM BROMIDE 50 MG: 10 INJECTION INTRAVENOUS at 15:35

## 2020-08-10 RX ADMIN — FENTANYL CITRATE 50 MCG: 50 INJECTION INTRAMUSCULAR; INTRAVENOUS at 15:34

## 2020-08-10 RX ADMIN — FENTANYL CITRATE 25 MCG: 50 INJECTION, SOLUTION INTRAMUSCULAR; INTRAVENOUS at 19:48

## 2020-08-10 RX ADMIN — SODIUM CHLORIDE: 4.5 INJECTION, SOLUTION INTRAVENOUS at 20:02

## 2020-08-10 RX ADMIN — HYDROMORPHONE HYDROCHLORIDE 0.5 MG: 2 INJECTION, SOLUTION INTRAMUSCULAR; INTRAVENOUS; SUBCUTANEOUS at 16:38

## 2020-08-10 RX ADMIN — PREGABALIN 150 MG: 150 CAPSULE ORAL at 14:01

## 2020-08-10 RX ADMIN — TRANEXAMIC ACID 1500 G: 1 INJECTION, SOLUTION INTRAVENOUS at 15:45

## 2020-08-10 RX ADMIN — ASPIRIN 325 MG: 325 TABLET, COATED ORAL at 22:58

## 2020-08-10 RX ADMIN — ROCURONIUM BROMIDE 50 MG: 10 INJECTION INTRAVENOUS at 16:55

## 2020-08-10 ASSESSMENT — PULMONARY FUNCTION TESTS
PIF_VALUE: 22
PIF_VALUE: 22
PIF_VALUE: 20
PIF_VALUE: 22
PIF_VALUE: 23
PIF_VALUE: 20
PIF_VALUE: 19
PIF_VALUE: 21
PIF_VALUE: 20
PIF_VALUE: 24
PIF_VALUE: 20
PIF_VALUE: 20
PIF_VALUE: 6
PIF_VALUE: 19
PIF_VALUE: 20
PIF_VALUE: 26
PIF_VALUE: 20
PIF_VALUE: 21
PIF_VALUE: 0
PIF_VALUE: 23
PIF_VALUE: 21
PIF_VALUE: 20
PIF_VALUE: 21
PIF_VALUE: 22
PIF_VALUE: 20
PIF_VALUE: 22
PIF_VALUE: 20
PIF_VALUE: 22
PIF_VALUE: 20
PIF_VALUE: 22
PIF_VALUE: 20
PIF_VALUE: 21
PIF_VALUE: 20
PIF_VALUE: 20
PIF_VALUE: 21
PIF_VALUE: 20
PIF_VALUE: 19
PIF_VALUE: 22
PIF_VALUE: 22
PIF_VALUE: 20
PIF_VALUE: 20
PIF_VALUE: 34
PIF_VALUE: 20
PIF_VALUE: 21
PIF_VALUE: 19
PIF_VALUE: 21
PIF_VALUE: 22
PIF_VALUE: 23
PIF_VALUE: 20
PIF_VALUE: 20
PIF_VALUE: 35
PIF_VALUE: 20
PIF_VALUE: 23
PIF_VALUE: 20
PIF_VALUE: 22
PIF_VALUE: 20
PIF_VALUE: 21
PIF_VALUE: 1
PIF_VALUE: 21
PIF_VALUE: 2
PIF_VALUE: 26
PIF_VALUE: 23
PIF_VALUE: 26
PIF_VALUE: 26
PIF_VALUE: 23
PIF_VALUE: 19
PIF_VALUE: 24
PIF_VALUE: 23
PIF_VALUE: 22
PIF_VALUE: 20
PIF_VALUE: 21
PIF_VALUE: 20
PIF_VALUE: 27
PIF_VALUE: 24
PIF_VALUE: 20
PIF_VALUE: 0
PIF_VALUE: 19
PIF_VALUE: 19
PIF_VALUE: 20
PIF_VALUE: 20
PIF_VALUE: 22
PIF_VALUE: 0
PIF_VALUE: 22
PIF_VALUE: 20
PIF_VALUE: 23
PIF_VALUE: 30
PIF_VALUE: 20
PIF_VALUE: 21
PIF_VALUE: 19
PIF_VALUE: 24
PIF_VALUE: 21
PIF_VALUE: 22
PIF_VALUE: 22
PIF_VALUE: 0
PIF_VALUE: 21
PIF_VALUE: 24
PIF_VALUE: 20
PIF_VALUE: 24
PIF_VALUE: 31
PIF_VALUE: 12
PIF_VALUE: 0
PIF_VALUE: 20
PIF_VALUE: 21
PIF_VALUE: 21
PIF_VALUE: 20
PIF_VALUE: 20
PIF_VALUE: 21
PIF_VALUE: 22
PIF_VALUE: 23
PIF_VALUE: 22
PIF_VALUE: 23
PIF_VALUE: 19
PIF_VALUE: 0
PIF_VALUE: 26
PIF_VALUE: 20
PIF_VALUE: 20
PIF_VALUE: 22
PIF_VALUE: 21
PIF_VALUE: 23
PIF_VALUE: 22
PIF_VALUE: 20
PIF_VALUE: 21
PIF_VALUE: 20
PIF_VALUE: 25
PIF_VALUE: 21
PIF_VALUE: 22
PIF_VALUE: 22
PIF_VALUE: 20
PIF_VALUE: 0
PIF_VALUE: 1
PIF_VALUE: 23
PIF_VALUE: 25
PIF_VALUE: 22
PIF_VALUE: 20
PIF_VALUE: 23
PIF_VALUE: 20
PIF_VALUE: 20
PIF_VALUE: 22
PIF_VALUE: 20
PIF_VALUE: 21
PIF_VALUE: 20
PIF_VALUE: 19
PIF_VALUE: 22
PIF_VALUE: 20
PIF_VALUE: 0
PIF_VALUE: 22
PIF_VALUE: 19
PIF_VALUE: 24
PIF_VALUE: 17
PIF_VALUE: 22
PIF_VALUE: 24
PIF_VALUE: 23

## 2020-08-10 ASSESSMENT — PAIN DESCRIPTION - DESCRIPTORS
DESCRIPTORS: DULL;ACHING
DESCRIPTORS: DULL;ACHING
DESCRIPTORS: DISCOMFORT
DESCRIPTORS: ACHING;DULL
DESCRIPTORS: DISCOMFORT
DESCRIPTORS: ACHING;BURNING

## 2020-08-10 ASSESSMENT — PAIN DESCRIPTION - ORIENTATION
ORIENTATION: RIGHT

## 2020-08-10 ASSESSMENT — PAIN DESCRIPTION - PROGRESSION
CLINICAL_PROGRESSION: NOT CHANGED
CLINICAL_PROGRESSION: NOT CHANGED

## 2020-08-10 ASSESSMENT — PAIN DESCRIPTION - LOCATION
LOCATION: HIP
LOCATION: INCISION;HIP
LOCATION: HIP
LOCATION: HIP;INCISION
LOCATION: INCISION;HIP

## 2020-08-10 ASSESSMENT — PAIN SCALES - GENERAL
PAINLEVEL_OUTOF10: 3
PAINLEVEL_OUTOF10: 9
PAINLEVEL_OUTOF10: 4
PAINLEVEL_OUTOF10: 3
PAINLEVEL_OUTOF10: 6
PAINLEVEL_OUTOF10: 1
PAINLEVEL_OUTOF10: 6

## 2020-08-10 ASSESSMENT — PAIN DESCRIPTION - FREQUENCY
FREQUENCY: CONTINUOUS

## 2020-08-10 ASSESSMENT — PAIN DESCRIPTION - PAIN TYPE
TYPE: SURGICAL PAIN
TYPE: ACUTE PAIN;SURGICAL PAIN
TYPE: SURGICAL PAIN

## 2020-08-10 ASSESSMENT — PAIN - FUNCTIONAL ASSESSMENT
PAIN_FUNCTIONAL_ASSESSMENT: ACTIVITIES ARE NOT PREVENTED
PAIN_FUNCTIONAL_ASSESSMENT: 0-10
PAIN_FUNCTIONAL_ASSESSMENT: ACTIVITIES ARE NOT PREVENTED

## 2020-08-10 ASSESSMENT — PAIN DESCRIPTION - ONSET
ONSET: ON-GOING
ONSET: ON-GOING

## 2020-08-10 NOTE — ANESTHESIA PROCEDURE NOTES
Peripheral Block    Patient location during procedure: post-op  Start time: 8/10/2020 7:10 PM  End time: 8/10/2020 7:23 PM  Staffing  Anesthesiologist: Steven How, DO  Performed: anesthesiologist   Preanesthetic Checklist  Completed: patient identified, site marked, surgical consent, pre-op evaluation, timeout performed, IV checked, risks and benefits discussed, monitors and equipment checked, anesthesia consent given, oxygen available and patient being monitored  Peripheral Block  Patient position: supine  Prep: ChloraPrep  Patient monitoring: cardiac monitor, continuous pulse ox, frequent blood pressure checks and IV access  Block type: Fascia iliaca  Laterality: right  Injection technique: single-shot  Procedures: ultrasound guided  Provider prep: mask and sterile gloves  Needle  Needle type: short-bevel   Needle gauge: 20 G  Needle length: 8 cm  Needle localization: ultrasound guidance  Test dose: negative  Assessment  Injection assessment: negative aspiration for heme, no paresthesia on injection and local visualized surrounding nerve on ultrasound  Hemodynamics: stable  Medications Administered  Ropivacaine (NAROPIN) injection 0.5%, 30 mL  Reason for block: procedure for pain, post-op pain management and at surgeon's request

## 2020-08-10 NOTE — FLOWSHEET NOTE
Patient received from the OR to pACU #5 post RIGHT TOTAL HIP ARTHROPLASTY ANTERIOR APPROACH - Right of Dr. Delfina Krueger. Placed on PACU monitoring equipment. Report given per CRNA. Per report, patient ran hypertensive but was stable intra op. On arrival, patient is arouseable, arrieta and states that surgical pain is 3/10, dull and tolerable.

## 2020-08-10 NOTE — BRIEF OP NOTE
Brief Postoperative Note      Patient: Franca Nichols  YOB: 1970  MRN: [id number]    Date of Procedure: 8/10/2020    Pre-Op Diagnosis: Osteoarthrosis, hip [M16.9]    Post-Op Diagnosis: Same       Procedure(s):  RIGHT TOTAL HIP ARTHROPLASTY ANTERIOR APPROACH    Surgeon(s):  MD Tania Styles MD    Assistant:  Surgical Assistant: Radene Maclachlan Holter    Anesthesia: General    Estimated Blood Loss (mL): 192     Complications: None    Specimens:   * No specimens in log *    Implants:  * No implants in log *      Drains: * No LDAs found *    Findings: Please refer to dictation for details    Electronically signed by Tania Higginbotham MD on 8/10/2020 at 5:52 PM

## 2020-08-10 NOTE — H&P
[facility]    [address] [place] [address] Same Day Surgery Update H & P  Department of General Surgery   Surgical Service   Pre-operative History and Physical  Last H & P within the last 30 days. DIAGNOSIS:   Osteoarthrosis, hip [M16.9]    PROCEDURE:  [place] TOTAL HIP ARTHROPLASTY [45691] (RIGHT TOTAL HIP ARTHROPLASTY ANTERIOR APPROACH)     HISTORY OF PRESENT ILLNESS:   Patient is a 45 y.o. female with chronic right hip pain and limited ROM in the setting of arthrosis. The symptoms have been recalcitrant to conservative treatment and the patient presents today for the above procedure. Covid 19:  Patient denies fever, chills, cough or known exposure to Covid-19.   Patient reports they have been quarantined at home since Covid-19 test.      Past Medical History:        Diagnosis Date    Arthritis     Hyperlipidemia     Hypertension     Thyroid disease     was hyperthyroid     Past Surgical History:        Procedure Laterality Date    COLONOSCOPY      FOOT SURGERY Bilateral     3 on each foot    OTHER SURGICAL HISTORY      Spinal stimulator left hip    TYMPANOSTOMY TUBE PLACEMENT Bilateral      Past Social History:  Social History     Socioeconomic History    Marital status:      Spouse name: None    Number of children: None    Years of education: None    Highest education level: None   Occupational History    None   Social Needs    Financial resource strain: None    Food insecurity     Worry: None     Inability: None    Transportation needs     Medical: None     Non-medical: None   Tobacco Use    Smoking status: Former Smoker     Last attempt to quit: 10/17/2018     Years since quittin.8    Smokeless tobacco: Never Used   Substance and Sexual Activity    Alcohol use: None     Comment: occassional    Drug use: Yes     Frequency: 7.0 times per week     Types: Marijuana     Comment: Gummies    Sexual activity: None   Lifestyle    Physical activity     Days per week: None Minutes per session: None    Stress: None   Relationships    Social connections     Talks on phone: None     Gets together: None     Attends Yarsanism service: None     Active member of club or organization: None     Attends meetings of clubs or organizations: None     Relationship status: None    Intimate partner violence     Fear of current or ex partner: None     Emotionally abused: None     Physically abused: None     Forced sexual activity: None   Other Topics Concern    None   Social History Narrative    None         Medications Prior to Admission:      Prior to Admission medications    Medication Sig Start Date End Date Taking? Authorizing Provider   nitrofurantoin, macrocrystal-monohydrate, (MACROBID) 100 MG capsule Take 100 mg by mouth 2 times daily   Yes Historical Provider, MD   gabapentin (NEURONTIN) 400 MG capsule Take 400 mg by mouth 3 times daily. Yes Historical Provider, MD   tiZANidine (ZANAFLEX) 4 MG tablet Take 4 mg by mouth every 6 hours as needed   Yes Historical Provider, MD   omeprazole (PRILOSEC) 20 MG delayed release capsule Take 20 mg by mouth daily   Yes Historical Provider, MD   cetirizine (ZYRTEC) 10 MG tablet Take 10 mg by mouth daily   Yes Historical Provider, MD   cycloSPORINE (RESTASIS) 0.05 % ophthalmic emulsion 1 drop 2 times daily   Yes Historical Provider, MD   fluticasone (FLONASE) 50 MCG/ACT nasal spray 1 spray by Each Nostril route daily   Yes Historical Provider, MD   lisinopril-hydrochlorothiazide (PRINZIDE;ZESTORETIC) 20-25 MG per tablet Take 1 tablet by mouth daily. Yes Historical Provider, MD   DULoxetine (CYMBALTA) 60 MG capsule Take 60 mg by mouth daily. Yes Historical Provider, MD   Polyethylene Glycol 3350 (MIRALAX PO) Take  by mouth.    Yes Historical Provider, MD   vitamin B-12 (CYANOCOBALAMIN) 1000 MCG tablet Take 1,000 mcg by mouth daily    Historical Provider, MD   meloxicam (MOBIC) 15 MG tablet Take 15 mg by mouth daily    Historical Provider, MD Turmeric 500 MG CAPS Take by mouth    Historical Provider, MD   aspirin 81 MG EC tablet Take 81 mg by mouth daily    Historical Provider, MD   acyclovir (ZOVIRAX) 200 MG capsule Take 800 mg by mouth daily. Historical Provider, MD   GLUCOSAMINE HCL-MSM PO Take  by mouth. Historical Provider, MD         Allergies:  Adhesive tape    PHYSICAL EXAM:      BP (!) 147/97   Pulse 82   Temp 98.9 °F (37.2 °C) (Oral)   Resp 16   Ht 5' 4\" (1.626 m)   Wt 230 lb (104.3 kg)   SpO2 97%   BMI 39.48 kg/m²      Airway:  Airway patent with no audible stridor    Heart:  Regular rate and rhythm, No murmur noted    Lungs:  No increased work of breathing, good air exchange, clear to auscultation bilaterally, no crackles or wheezing    Abdomen:  Soft, non-distended, non-tender, normal active bowel sounds, no masses palpated    ASSESSMENT AND PLAN    Patient is a 45 y.o. female with above specified procedure planned. 1.  Patient seen and focused exam done today- no new changes since last physical exam on 8/3/20    2. Access to ancillary services are available per request of the provider.     CORNELIO Macias - CNP     8/10/2020

## 2020-08-10 NOTE — ANESTHESIA PRE PROCEDURE
Department of Anesthesiology  Preprocedure Note       Name:  Juancarlos Houston   Age:  45 y.o.  :  1970                                          MRN:  [id number]         Date:  8/10/2020      Surgeon: Jennifer Gaines):  MD Brian Cole MD    Procedure: Procedure(s):  RIGHT TOTAL HIP ARTHROPLASTY ANTERIOR APPROACH    Medications prior to admission:   Prior to Admission medications    Medication Sig Start Date End Date Taking? Authorizing Provider   nitrofurantoin, macrocrystal-monohydrate, (MACROBID) 100 MG capsule Take 100 mg by mouth 2 times daily   Yes Historical Provider, MD   vitamin B-12 (CYANOCOBALAMIN) 1000 MCG tablet Take 1,000 mcg by mouth daily   Yes Historical Provider, MD   gabapentin (NEURONTIN) 400 MG capsule Take 400 mg by mouth 3 times daily. Yes Historical Provider, MD   tiZANidine (ZANAFLEX) 4 MG tablet Take 4 mg by mouth every 6 hours as needed   Yes Historical Provider, MD   meloxicam (MOBIC) 15 MG tablet Take 15 mg by mouth daily   Yes Historical Provider, MD   omeprazole (PRILOSEC) 20 MG delayed release capsule Take 20 mg by mouth daily   Yes Historical Provider, MD   Turmeric 500 MG CAPS Take by mouth   Yes Historical Provider, MD   cetirizine (ZYRTEC) 10 MG tablet Take 10 mg by mouth daily   Yes Historical Provider, MD   aspirin 81 MG EC tablet Take 81 mg by mouth daily   Yes Historical Provider, MD   cycloSPORINE (RESTASIS) 0.05 % ophthalmic emulsion 1 drop 2 times daily   Yes Historical Provider, MD   fluticasone (FLONASE) 50 MCG/ACT nasal spray 1 spray by Each Nostril route daily   Yes Historical Provider, MD   lisinopril-hydrochlorothiazide (PRINZIDE;ZESTORETIC) 20-25 MG per tablet Take 1 tablet by mouth daily. Yes Historical Provider, MD   acyclovir (ZOVIRAX) 200 MG capsule Take 800 mg by mouth daily. Yes Historical Provider, MD   DULoxetine (CYMBALTA) 60 MG capsule Take 60 mg by mouth daily.    Yes Historical Provider, MD   GLUCOSAMINE HCL-MSM PO Take  by Counseling given: Not Answered      Vital Signs (Current):   Vitals:    08/07/20 1108 08/10/20 1345   BP:  (!) 147/97   Pulse:  82   Resp:  16   Temp:  98.9 °F (37.2 °C)   TempSrc:  Oral   SpO2:  97%   Weight: 230 lb (104.3 kg) 230 lb (104.3 kg)   Height: 5' 4\" (1.626 m) 5' 4\" (1.626 m)                                              BP Readings from Last 3 Encounters:   08/10/20 (!) 147/97   07/07/20 130/84       NPO Status: Time of last liquid consumption: 1100                        Time of last solid consumption: 2000                        Date of last liquid consumption: 08/10/20                        Date of last solid food consumption: 08/09/20    BMI:   Wt Readings from Last 3 Encounters:   08/10/20 230 lb (104.3 kg)   07/07/20 228 lb (103.4 kg)     Body mass index is 39.48 kg/m². CBC:   Lab Results   Component Value Date    WBC 6.6 08/10/2020    RBC 4.54 08/10/2020    HGB 14.9 08/10/2020    HCT 43.9 08/10/2020    MCV 96.5 08/10/2020    RDW 14.9 08/10/2020     08/10/2020       CMP: No results found for: NA, K, CL, CO2, BUN, CREATININE, GFRAA, AGRATIO, LABGLOM, GLUCOSE, PROT, CALCIUM, BILITOT, ALKPHOS, AST, ALT    POC Tests: No results for input(s): POCGLU, POCNA, POCK, POCCL, POCBUN, POCHEMO, POCHCT in the last 72 hours.     Coags:   Lab Results   Component Value Date    APTT 34.4 08/10/2020       HCG (If Applicable):   Lab Results   Component Value Date    PREGTESTUR Negative 08/10/2020        ABGs: No results found for: PHART, PO2ART, LNT6RSL, ZUP3RPI, BEART, Y9SCBLQK     Type & Screen (If Applicable):  No results found for: LABABO, LABRH    Drug/Infectious Status (If Applicable):  No results found for: HIV, HEPCAB    COVID-19 Screening (If Applicable):   Lab Results   Component Value Date    COVID19 NOT DETECTED 08/05/2020         Anesthesia Evaluation  Patient summary reviewed and Nursing notes reviewed  Airway: Mallampati: II  TM distance: >3 FB   Neck ROM: full  Mouth opening: > = 3 FB Dental: Pulmonary:Negative Pulmonary ROS and normal exam  breath sounds clear to auscultation            Patient did not smoke on day of surgery. Cardiovascular:  Exercise tolerance: good (>4 METS),   (+) hypertension: mild,         Rhythm: regular  Rate: normal           Beta Blocker:  Not on Beta Blocker         Neuro/Psych:   Negative Neuro/Psych ROS              GI/Hepatic/Renal: Neg GI/Hepatic/Renal ROS            Endo/Other: Negative Endo/Other ROS                    Abdominal:       Abdomen: soft. Vascular: negative vascular ROS. Anesthesia Plan      general     ASA 2       Induction: intravenous. MIPS: Postoperative opioids intended and Prophylactic antiemetics administered. Anesthetic plan and risks discussed with patient. Use of blood products discussed with patient whom consented to blood products. Plan discussed with attending and CRNA.     Attending anesthesiologist reviewed and agrees with Pre Eval content              Franky Palafox DO   8/10/2020

## 2020-08-10 NOTE — OP NOTE
Operative Note      Patient: Rogelio Hernandez  YOB: 1970  MRN: [id number]    Date of Procedure: 8/10/2020    Pre-Op Diagnosis: Osteoarthrosis, hip [M16.9]    Post-Op Diagnosis: Same       Procedure(s):  RIGHT TOTAL HIP ARTHROPLASTY ANTERIOR APPROACH    Surgeon(s):  MD Vilma Bonilla MD    Assistant:   Surgical Assistant: Ledell Riles Holter    Anesthesia: General    Estimated Blood Loss (mL): 846     Complications: None    Specimens:   * No specimens in log *    Implants:  * No implants in log *      Drains: * No LDAs found *    Findings: The morbid obesity in this patient added significant time, effort and complexity creating additional intraoperative difficulty procedurally for this case and is considered an unusual procedural service for this surgery. Detailed Description of Procedure:   Direct Anterior Total Hip Operative Note    Patient: Rogelio Hernandez MRN: [id number]     YOB: 1970  Age: 45 y.o. Sex: female      Date of Operation: [unfilled]        Preoperative Diagnosis: End-stage osteoarthritis,  right hip. Postoperative Diagnosis: End-stage osteoarthritis, right hip. Procedure Performed: Direct anterior right total hip arthroplasty. Surgeon: Sabas Solorio  Assistant(s): Circulator: Ema Church RN; Arian Alfonso RN  Surgical Assistant: Ledell Riles Holter  Radiology Technologist: Kalia Mcadams Circulator: Deepti Yin RN  Scrub Person First: Brittany Sotomayor  Anesthesia: General  Estimated Blood Loss:  300 ml  Drains: None  Implants:  [employer]   Anthology stem , size 5 , high Offset ; 32mm 0 femoral head. 50 mm  R3 acetabulum with a 20 degree XLPE liner with offset with 2 dome screws. Indication For Operation: This is a 45 y.o. female with end-stage osteoarthritis of she right hip that failed to respond to prolonged nonoperative measures. They had no contraindications to surgery.  In the office treatment was thoroughly discussed including above procedure. The relative risks and benefits of direct anterior versus posterior approach of the right hip were discussed. The relative increase risk of iatrogenic fracture, cutaneous nerve injury with a direct anterior approach were discussed along with potential wound healing problems versus the relative risks of dislocation, leg length discrepancy with posterior approach and they elected an anterior approach at this time. They gave informed consent to proceed. Description of Procedure: The patient was taken to the operating room at [facility]. and a after satisfactory induction of general endotracheal anesthesia after preoperative regional block (fascia iliacus). The  righthip and lower extremity were then prepped and draped in the usual sterile fashion for a direct anterior approach. An approximately 10 cm longitudinal incision was made beginning 2 cm distal and posterior to the ASIS. Sharp dissection was carried down through the skin and subcutaneous tissue. The fascia over the fascia ajay was incised longitudinally in line with the skin incision. Kocher retractors were placed. The tensor fascia muscle was peeled off the medial wall of fascia developing the interval between it and the rectus femoris. Circumflex vessels were identified and cauterized and Cobra retractors were placed extra capsularly about the femoral neck. The capsule was opened in an inverted T fashion and tagged for later repair. Retractors were then placed intracapsularly and traction was applied. Proximal femoral resection was carried out a fingerbreadth proximal to the lesser trochanter in a napkin ring fashion. The femoral head was extracted using the power corkscrew and excellent direct visualization of the acetabulum was obtained. Retractor was placed anteriorly inferiorly and directly posteriorly as well as directly inferiorly allowing excellent circumferential exposure.  The labrum was excised along the soft tissue from the floor of the acetabulum. A curette was used to identify the true medial wall. Initial medialization was carried out and then sequential reaming was carried out with the hemispherical reamers. Final reaming was carried out to a size 49. A size 50 three hole R3 acetabular cup from [address] [place] [address] was placed on the insertion handle and impacted into place under direct fluoroscopic visualization to ensure appropriate anteversion and inclination. Excellent seating was obtained and good purchase was obtained with 2 supplemental screws. The 20 degree lip XLPE liner was placed in a posterior inferior position and good purchase was obtained. Meticulous injection of the posterior and inferior capsule was carried out with 60 cc of injection cocktail. Attention was then turned towards the femoral preparation. Posterior and inferior capsulotomies had been carried out under initial exposure following acetabular placement. The knee was fully extended to the floor in maximum adducted and externally rotated. Superior capsulotomy was carried out allowing good visualization. External rotation of the femur was carried out to a 140 degrees. Retractor was placed posterior to the greater trochanter and the femoral hook was placed to deliver the proximal femur into the wound. The canal was sounded with awl and a curette and maximal lateralization was carried out with the curette and the rongeuer. Sequential broaching was then carried out with the [facility] [place] and [employer] broach only system. Neutral varus orientation was maintained. The natural version of the proximal femur was followed. Trial reduction was carried out with the size 5 broach in place to assess leg lengths which appeared equal with a +0 mm head in place. This was improved with use of offset of the poly-ethelene and more superior position of the lip of the liner.   Shuck test was optimal. External rotation was stable beyond 100

## 2020-08-10 NOTE — FLOWSHEET NOTE
Dr. Martinez Favor at bedside to see patient regarding possible nerve block. Patient with episode of hypotension with BP into the 42'O systolic. States, \"feels funny, queasy\". Placed supine, IVF wide open. Dr. Martinez Favor at bedside and aware.

## 2020-08-11 VITALS
HEIGHT: 64 IN | DIASTOLIC BLOOD PRESSURE: 93 MMHG | OXYGEN SATURATION: 98 % | TEMPERATURE: 98 F | BODY MASS INDEX: 37.9 KG/M2 | SYSTOLIC BLOOD PRESSURE: 147 MMHG | WEIGHT: 222 LBS | RESPIRATION RATE: 18 BRPM | HEART RATE: 76 BPM

## 2020-08-11 LAB
ANION GAP SERPL CALCULATED.3IONS-SCNC: 10 MMOL/L (ref 3–16)
BUN BLDV-MCNC: 12 MG/DL (ref 7–20)
CALCIUM SERPL-MCNC: 8.6 MG/DL (ref 8.3–10.6)
CHLORIDE BLD-SCNC: 98 MMOL/L (ref 99–110)
CO2: 25 MMOL/L (ref 21–32)
CREAT SERPL-MCNC: 0.8 MG/DL (ref 0.6–1.1)
GFR AFRICAN AMERICAN: >60
GFR NON-AFRICAN AMERICAN: >60
GLUCOSE BLD-MCNC: 121 MG/DL (ref 70–99)
HCT VFR BLD CALC: 33.8 % (ref 36–48)
HEMOGLOBIN: 11.6 G/DL (ref 12–16)
MCH RBC QN AUTO: 32.5 PG (ref 26–34)
MCHC RBC AUTO-ENTMCNC: 34.2 G/DL (ref 31–36)
MCV RBC AUTO: 95.1 FL (ref 80–100)
PDW BLD-RTO: 14.3 % (ref 12.4–15.4)
PLATELET # BLD: 277 K/UL (ref 135–450)
PMV BLD AUTO: 8.3 FL (ref 5–10.5)
POTASSIUM SERPL-SCNC: 4.6 MMOL/L (ref 3.5–5.1)
RBC # BLD: 3.56 M/UL (ref 4–5.2)
SODIUM BLD-SCNC: 133 MMOL/L (ref 136–145)
WBC # BLD: 13.3 K/UL (ref 4–11)

## 2020-08-11 PROCEDURE — 85027 COMPLETE CBC AUTOMATED: CPT

## 2020-08-11 PROCEDURE — 80048 BASIC METABOLIC PNL TOTAL CA: CPT

## 2020-08-11 PROCEDURE — 97165 OT EVAL LOW COMPLEX 30 MIN: CPT

## 2020-08-11 PROCEDURE — 2580000003 HC RX 258: Performed by: PHYSICIAN ASSISTANT

## 2020-08-11 PROCEDURE — 97110 THERAPEUTIC EXERCISES: CPT

## 2020-08-11 PROCEDURE — 94640 AIRWAY INHALATION TREATMENT: CPT

## 2020-08-11 PROCEDURE — 99243 OFF/OP CNSLTJ NEW/EST LOW 30: CPT | Performed by: INTERNAL MEDICINE

## 2020-08-11 PROCEDURE — 6370000000 HC RX 637 (ALT 250 FOR IP): Performed by: PHYSICIAN ASSISTANT

## 2020-08-11 PROCEDURE — 94760 N-INVAS EAR/PLS OXIMETRY 1: CPT

## 2020-08-11 PROCEDURE — 97161 PT EVAL LOW COMPLEX 20 MIN: CPT

## 2020-08-11 PROCEDURE — 6360000002 HC RX W HCPCS: Performed by: PHYSICIAN ASSISTANT

## 2020-08-11 PROCEDURE — 97116 GAIT TRAINING THERAPY: CPT

## 2020-08-11 PROCEDURE — 99024 POSTOP FOLLOW-UP VISIT: CPT | Performed by: PHYSICIAN ASSISTANT

## 2020-08-11 PROCEDURE — G0378 HOSPITAL OBSERVATION PER HR: HCPCS

## 2020-08-11 PROCEDURE — 97530 THERAPEUTIC ACTIVITIES: CPT

## 2020-08-11 PROCEDURE — 36415 COLL VENOUS BLD VENIPUNCTURE: CPT

## 2020-08-11 RX ORDER — PREDNISONE 10 MG/1
10 TABLET ORAL DAILY
Qty: 10 TABLET | Refills: 0 | Status: SHIPPED | OUTPATIENT
Start: 2020-08-11 | End: 2020-08-21

## 2020-08-11 RX ORDER — CEFADROXIL 500 MG/1
500 CAPSULE ORAL 2 TIMES DAILY
Qty: 20 CAPSULE | Refills: 0 | Status: SHIPPED | OUTPATIENT
Start: 2020-08-11 | End: 2020-08-21

## 2020-08-11 RX ORDER — TIZANIDINE 4 MG/1
4 TABLET ORAL EVERY 8 HOURS PRN
Qty: 60 TABLET | Refills: 1 | Status: SHIPPED | OUTPATIENT
Start: 2020-08-11

## 2020-08-11 RX ORDER — OXYCODONE HYDROCHLORIDE AND ACETAMINOPHEN 5; 325 MG/1; MG/1
1 TABLET ORAL EVERY 6 HOURS PRN
Qty: 28 TABLET | Refills: 0 | Status: SHIPPED | OUTPATIENT
Start: 2020-08-11 | End: 2020-08-18

## 2020-08-11 RX ORDER — ZOLPIDEM TARTRATE 5 MG/1
5 TABLET ORAL NIGHTLY PRN
Qty: 14 TABLET | Refills: 0 | Status: SHIPPED | OUTPATIENT
Start: 2020-08-11 | End: 2020-08-25

## 2020-08-11 RX ADMIN — LISINOPRIL 20 MG: 20 TABLET ORAL at 08:35

## 2020-08-11 RX ADMIN — HYDROCHLOROTHIAZIDE 25 MG: 25 TABLET ORAL at 08:35

## 2020-08-11 RX ADMIN — ACETAMINOPHEN 650 MG: 325 TABLET ORAL at 10:58

## 2020-08-11 RX ADMIN — NITROFURANTOIN (MONOHYDRATE/MACROCRYSTALS) 100 MG: 75; 25 CAPSULE ORAL at 08:35

## 2020-08-11 RX ADMIN — GABAPENTIN 400 MG: 400 CAPSULE ORAL at 08:35

## 2020-08-11 RX ADMIN — PANTOPRAZOLE SODIUM 40 MG: 40 TABLET, DELAYED RELEASE ORAL at 06:15

## 2020-08-11 RX ADMIN — ACYCLOVIR 400 MG: 200 CAPSULE ORAL at 08:35

## 2020-08-11 RX ADMIN — MELOXICAM 15 MG: 15 TABLET ORAL at 08:35

## 2020-08-11 RX ADMIN — CEFAZOLIN 2 G: 10 INJECTION, POWDER, FOR SOLUTION INTRAVENOUS at 00:08

## 2020-08-11 RX ADMIN — DULOXETINE HYDROCHLORIDE 60 MG: 60 CAPSULE, DELAYED RELEASE ORAL at 08:35

## 2020-08-11 RX ADMIN — CEFAZOLIN 2 G: 10 INJECTION, POWDER, FOR SOLUTION INTRAVENOUS at 10:57

## 2020-08-11 RX ADMIN — CYANOCOBALAMIN TAB 1000 MCG 1000 MCG: 1000 TAB at 08:35

## 2020-08-11 RX ADMIN — PREDNISONE 10 MG: 10 TABLET ORAL at 08:35

## 2020-08-11 RX ADMIN — ASPIRIN 325 MG: 325 TABLET, COATED ORAL at 08:35

## 2020-08-11 RX ADMIN — ACETAMINOPHEN 650 MG: 325 TABLET ORAL at 06:15

## 2020-08-11 RX ADMIN — CETIRIZINE HYDROCHLORIDE 10 MG: 10 TABLET, FILM COATED ORAL at 08:35

## 2020-08-11 RX ADMIN — FLUTICASONE PROPIONATE 1 SPRAY: 50 SPRAY, METERED NASAL at 10:57

## 2020-08-11 RX ADMIN — OXYCODONE 5 MG: 5 TABLET ORAL at 07:23

## 2020-08-11 RX ADMIN — Medication 10 ML: at 11:06

## 2020-08-11 RX ADMIN — CYCLOSPORINE 1 DROP: 0.5 EMULSION OPHTHALMIC at 10:58

## 2020-08-11 RX ADMIN — DOCUSATE SODIUM 50MG AND SENNOSIDES 8.6MG 1 TABLET: 8.6; 5 TABLET, FILM COATED ORAL at 08:35

## 2020-08-11 RX ADMIN — OXYCODONE 5 MG: 5 TABLET ORAL at 12:53

## 2020-08-11 ASSESSMENT — PAIN SCALES - GENERAL
PAINLEVEL_OUTOF10: 5
PAINLEVEL_OUTOF10: 2
PAINLEVEL_OUTOF10: 1
PAINLEVEL_OUTOF10: 5
PAINLEVEL_OUTOF10: 2

## 2020-08-11 ASSESSMENT — PAIN DESCRIPTION - PROGRESSION: CLINICAL_PROGRESSION: NOT CHANGED

## 2020-08-11 NOTE — PROGRESS NOTES
Department of Orthopedic Surgery  Physician Assistant   Progress Note    Subjective:     Post-Operative Day: 1 Status Post right Total Hip Arthroplasty  Systemic or Specific Complaints:No Complaints    Objective:     Patient Vitals for the past 24 hrs:   BP Temp Temp src Pulse Resp SpO2 Height Weight   08/11/20 0715 128/76 98.4 °F (36.9 °C) Oral 60 16 96 % -- --   08/11/20 0315 (!) 149/89 98.8 °F (37.1 °C) Oral 76 16 97 % -- --   08/11/20 0030 -- -- -- -- 16 99 % -- --   08/11/20 0000 (!) 169/97 98 °F (36.7 °C) Oral 86 16 97 % -- --   08/10/20 2138 (!) 176/91 98.3 °F (36.8 °C) Oral 93 16 96 % 5' 4\" (1.626 m) 222 lb (100.7 kg)   08/10/20 2100 (!) 152/85 98.2 °F (36.8 °C) Temporal 95 22 96 % -- --   08/10/20 2045 (!) 154/78 -- -- 86 15 95 % -- --   08/10/20 2030 (!) 158/81 -- -- 80 14 96 % -- --   08/10/20 2015 (!) 173/83 -- -- 85 16 99 % -- --   08/10/20 2012 -- -- -- 85 -- -- -- --   08/10/20 2000 (!) 173/97 -- -- 82 18 100 % -- --   08/10/20 1945 (!) 177/90 -- -- 79 13 100 % -- --   08/10/20 1930 (!) 175/84 -- -- 88 16 100 % -- --   08/10/20 1915 (!) 169/109 -- -- 78 10 100 % -- --   08/10/20 1900 (!) 171/89 -- -- 81 15 99 % -- --   08/10/20 1845 (!) 158/86 -- -- 79 16 97 % -- --   08/10/20 1836 121/76 -- -- 73 11 100 % -- --   08/10/20 1833 (!) 78/52 -- -- 64 14 (!) 89 % -- --   08/10/20 1830 (!) 87/47 -- -- 64 13 97 % -- --   08/10/20 1820 (!) 154/97 -- -- 88 11 98 % -- --   08/10/20 1815 (!) 166/88 -- -- 85 8 98 % -- --   08/10/20 1812 (!) 164/87 98.3 °F (36.8 °C) Temporal 84 14 98 % -- --   08/10/20 1345 (!) 147/97 98.9 °F (37.2 °C) Oral 82 16 97 % 5' 4\" (1.626 m) 230 lb (104.3 kg)       General: alert, appears stated age and cooperative   Wound: Wound clean and dry no evidence of infection. , No Erythema, No Edema, No Drainage and Wound Intact   Motion: Painless Range of Motion   DVT Exam: No evidence of DVT seen on physical exam.  Negative Suman's sign. No cords or calf tenderness.   No significant

## 2020-08-11 NOTE — FLOWSHEET NOTE
PACU Transfer Note    Vitals:    08/10/20 2100   BP: (!) 152/85   Pulse: 95   Resp: 22   Temp: 98.2 °F (36.8 °C)   SpO2: 96%     BP within 20% of baseline value. In: 2950 [P.O.:400; I.V.:2550]  Out: 0     Pain assessment:  present - adequately treated  Pain Level: 3    Report given to Receiving unit RNAshutosh at bedside in the PACU previously while waiting for room to be cleaned. Transferred with all belongings including cell phone to ready room per PACU transport.     8/10/2020 9:23 PM

## 2020-08-11 NOTE — PLAN OF CARE
Problem: Falls - Risk of:  Goal: Will remain free from falls  Description: Will remain free from falls  8/11/2020 1053 by Verónica Lopez RN  Outcome: Ongoing  Note: Patient alert and oriented X4, non-skid socks on, bed in lowest position and locked, side rails up X2, call light and belongings within reach, bed alarm on for safety, and fall sign posted. Will continue to monitor.

## 2020-08-11 NOTE — PROGRESS NOTES
Pt was up to the bedside to attempt to go to the bathroom. Pt unable to walk due the block. Pt buckled x 3, so BSC was used. Pt was able to void. Will continue to monitor.

## 2020-08-11 NOTE — PROGRESS NOTES
Equipment: Crutches, Reacher  ADL Assistance: Independent  Homemaking Assistance: (shares with family)  Ambulation Assistance: Independent  Transfer Assistance: Independent  Active : Yes  Occupation: Full time employment  Type of occupation:   Additional Comments: Pt denies any recent falls. Objective        Orientation  Overall Orientation Status: Within Functional Limits     Standing Balance  Comment: Mod I to spvn for functional mobility with crutches during PT eval. Pt with no concerns related to transfers, including toilet (handicap height) and shower (walk-in with short threshold). Educated on safe technique with crutches - verb understanding.   ADL  LE Bathing: Other (Comment)(Educated on lower body bathing techniques both with and without AE, encouraged seated bathing; pt verb understanding, likely to obtain a shower chair and long handled sponge)  LE Dressing: Other (Comment)(Educated on LE dressing techniques both with and without reacher; pt verb understanding + will have assist of  as needed)  Additional Comments: Educated on anterior hip precautions as they relate to ADLs - pt verb understanding, is starting to commit them to memory and will keep \"cheat sheet\" handy initially     Plan  - D/C OT services       Therapy Time   Individual Concurrent Group Co-treatment   Time In 1036         Time Out 1046         Minutes 10          Timed Code Tx Min: 0  Total Tx Time: 10       Seema Dahl, OT

## 2020-08-11 NOTE — PROGRESS NOTES
Physical Therapy    Facility/Department: [facility] 5T ORTHO/NEURO  Initial Assessment/discharge note    NAME: Kentrell Barcenas  : 1970  MRN: [id number]    Date of Service: 2020    Discharge Recommendations:Yumiko Plasencia scored a 22/24 on the AM-PAC short mobility form. Current research shows that an AM-PAC score of 18 or greater is typically associated with a discharge to the patient's home setting. Based on the patient's AM-PAC score and their current functional mobility deficits, it is recommended that the patient have 2-3 sessions per week of Physical Therapy at d/c to increase the patient's independence. At this time, this patient demonstrates the endurance and safety to discharge home with  (home  services) and a follow up treatment frequency of 2-3x/wk. Please see assessment section for further patient specific details. .         PT Equipment Recommendations  Equipment Needed: (fit and issued one pair std crutches)    Assessment   Assessment: [facility] adnutted 8/10/20 for R THR. Pt demo good mobility and OK for d/c home from PT standpoint. Pt reports she will have 24hour supervision of spouse initially. Pt fit and issued 1 pr std crutches for home, no other DME needs. Pt plans to pursue home PT. No acute PT needs so will sign off. RN aware. Treatment Diagnosis: mobility impairment due to R THR  Decision Making: Low Complexity  Patient Education: Pt educated on WBAT R, anterior hip precautions, importance of OOB mobility and she verbalized understanding. REQUIRES PT FOLLOW UP: No  Activity Tolerance  Activity Tolerance: Patient Tolerated treatment well;Patient limited by pain       Patient Diagnosis(es): The encounter diagnosis was Status post total hip replacement, right.     has a past medical history of Arthritis, Hyperlipidemia, Hypertension, and Thyroid disease. has a past surgical history that includes Foot surgery (Bilateral); Tympanostomy tube placement (Bilateral);  Colonoscopy; other surgical history; and Total hip arthroplasty (Right, 8/10/2020). Restrictions  Position Activity Restriction  Other position/activity restrictions: up with assist, anterior hip precautions, WBAT, spinal block     Vision/Hearing  Vision: Within Functional Limits  Hearing: Within functional limits       Subjective  General  Chart Reviewed: Yes  Additional Pertinent Hx: 44 yo admitted 8/10/20 for R THR per Dr. Jessica Leos. Pmhx: HTN, OA. Family / Caregiver Present: No  Diagnosis: R THR  Follows Commands: Within Functional Limits  Subjective  Subjective: Pt found supine in bed and agreeable to PT. \" I'm going home today. \"  Pain Screening  Patient Currently in Pain: Yes(c/o R hip pain 4/10, RN aware)         Orientation  Orientation  Overall Orientation Status: Within Functional Limits     Social/Functional History  Social/Functional History  Lives With: Spouse(son and his family)  Type of Home: House  Home Layout: Bed/Bath upstairs  Home Access: (2 to enter without rail)  Bathroom Shower/Tub: Walk-in shower  Bathroom Toilet: Handicap height(sink nearby)  Home Equipment: Crutches, Reacher  ADL Assistance: Independent  Homemaking Assistance: (shares with family)  Ambulation Assistance: Independent  Transfer Assistance: Independent  Active : Yes  Occupation: Full time employment  Type of occupation:   Additional Comments: Pt denies any recent falls.        Objective                      Bed mobility  Supine to Sit: Independent  Sit to Supine: Minimal assistance(for R LE)     Transfers  Sit to Stand: Modified independent(x3 trials)  Stand to sit: Modified independent(x3 trials)     Ambulation  Device: Axillary Crutches  Assistance: Modified Independent  Quality of Gait: slow, steady gait with decreased stance time on R LE; WBAT R  Distance: 150 ft, 20 ft  Stairs/Curb  Stairs?: (up/down 3 steps with crutches superivison WBAT R)        Exercises  Comments: Pt demo THR HEP x10 with verbal/visual cues supervision except min assist for SLR.      Plan   Safety Devices  Type of devices: Nurse notified, Bed alarm in place, Left in bed, Call light within reach           AM-PAC Score  AM-PAC Inpatient Mobility Raw Score : 22 (08/11/20 1102)  AM-PAC Inpatient T-Scale Score : 53.28 (08/11/20 1102)  Mobility Inpatient CMS 0-100% Score: 20.91 (08/11/20 1102)  Mobility Inpatient CMS G-Code Modifier : CJ (08/11/20 1102)          Therapy Time   Individual Concurrent Group Co-treatment   Time In 0900         Time Out 1001         Minutes 61           Timed Code Treatment Minutes:51       Total Treatment Minutes:  61       Brady Murray, PT

## 2020-08-11 NOTE — PLAN OF CARE
Problem: Pain:  Goal: Pain level will decrease  Outcome: Ongoing   Medicated pt per orders, please see e-Mar. Encourage pt to call if pain increases. Will continue to monitor. Problem: Falls - Risk of:  Goal: Will remain free from falls  Outcome: Ongoing   Pt remains free from injury during this shift. Pt is up with assistance x 2 person. Encourage pt to call for all assistance. Call light is in reach, bed alarm is activated for safety, bed locked and in lowest position. Will continue monitor.

## 2020-08-11 NOTE — PROGRESS NOTES
Clinical Pharmacy Note:    Glucosamine ordered for patient from home medication list. Per [facility] policy, herbals are not to be administered in the hospital.  Will discontinue order. May resume upon discharge. Please call pharmacy with any questions. Thanks!     Demetria Jamil PharmD  [phone]

## 2020-08-11 NOTE — CONSULTS
[facility] [address]                                  CONSULTATION    PATIENT NAME: Mia Dobbins                   :        1970  MED REC NO:   [id number]                          ROOM:       [id number]  ACCOUNT NO:   [de-identified]                           ADMIT DATE: 08/10/2020  PROVIDER:     Arlyn Holder MD    CONSULT DATE:  2020    INTERNAL MEDICINE CONSULTATION    HISTORY OF PRESENT ILLNESS:  This patient is a 62-year-old white female  with a history of hypertension and hyperlipidemia as well as DJD and  thyroid disease, who is admitted for a total hip replacement. Postoperatively, she denies any chest pain, shortness of breath or any  other problems. PRESENT MEDICATIONS:  As per the med sheet. SOCIAL HISTORY:  She is a . Does not smoke excessively. Does not drink excessively. She was a former smoker, quit in 2018. PHYSICAL EXAMINATION:  GENERAL:  Currently, she is in no acute distress. VITAL SIGNS:  Blood pressure is 130/80, pulse rate 80, respiratory rate  12 and easy. HEENT:  Head:  Atraumatic, normocephalic. Eyes:  Sclerae noninjected. NECK:  No jugular venous distension. No adenopathy. LUNGS:  Clear. HEART:  Reveals normal S1 and S2 without any murmurs or gallops. ABDOMEN:  Soft without tenderness. EXTREMITIES:  Reveal evidence of total joint replacement. LABORATORY DATA:  Labs preoperatively were not available. Postoperatively, her sodium is slightly low at 133, glucose is 121  nonfasting. Hemoglobin and hematocrit are 11.6 and 33.8, white count  13.3, and platelets 382. Prior to surgery, her hemoglobin was 14.9 and  43.8 hematocrit, white count 6.6, and platelets 307. EKG not available. IMPRESSION:  1. Status post total hip replacement. 2.  History of thyroid disease, clinically stable. 3.  History of hypertension, stable.   4.  History of hyperlipidemia, stable. PLAN:  1. DVT prophylaxis. Physical therapy. Early ambulation. 2.  Continue home meds. 3.  Further therapy pending above. If the patient is to be discharged,  she is to follow up with her primary care physician in two to four  weeks. She remains medically stable at present.         Deanna Johnson MD    D: 08/11/2020 8:13:56       T: 08/11/2020 8:57:05     [email or web address]  Job#: [id number]     Doc#: [id number]    CC:

## 2020-08-11 NOTE — CARE COORDINATION
Case Management Assessment            Discharge Note                    Date / Time of Note: 8/11/2020 7:12 PM                  Discharge Note Completed by: Tanya Holley     Spoke with patient while rounding with Jo Trotter RN and patient will d/c to home and has agreed to home care with [facility]. Referral made with [facility] to provide home care. Patient has no DME needs and spouse will transport to home. Patient Name: Shawnee Rossi   YOB: 1970  Diagnosis: Osteoarthrosis, hip [M16.9]  Status post total hip replacement, right [Z96.641]   Date / Time: 8/10/2020  1:35 PM    Current PCP: [address] patient: No    Hospitalization in the last 30 days: No    Advance Directives:  Code Status: Prior  [place] DNR form completed and on chart: No    Financial:  Payor: Nancy Mabry / Plan: [employer] PPO / Product Type: *No Product type* /      Pharmacy:    [facility]/pharmacy #[id number] [address]  Phone: [phone] Fax: [phone]      Assistance purchasing medications?: Potential Assistance Purchasing Medications: No  Assistance provided by Case Management: None at this time    Does patient want to participate in local refill/ meds to beds program?: Yes    Meds To Beds General Rules:  1. Can ONLY be done Monday- Friday between 8:30am-5pm  2. Prescription(s) must be in pharmacy by 3pm to be filled same day  3. Copy of patient's insurance/ prescription drug card and patient face sheet must be sent along with the prescription(s)  4. Cost of Rx cannot be added to hospital bill. If financial assistance is needed, please contact unit  or ;  or  CANNOT provide pharmacy voucher for patients co-pays  5.  Patients can then  the prescription on their way out of the hospital at discharge, or pharmacy can deliver to the bedside if staff is available. (payment due at time of pick-up or delivery - cash, check, or card accepted)     Able to afford home medications/ co-pay costs: Yes    ADLS:  Current PT AM-PAC Score: 22 /24  Current OT AM-PAC Score:   /24      DISCHARGE Disposition: Home with [address] [email or web address] [address]: [address]     LOC at discharge: Not Applicable  GAB Completed: Not Indicated    Notification completed in HENS/PAS?:  Not Applicable    IMM Completed:   Not Indicated    Transportation:  Transportation PLAN for discharge: family   Mode of Transport: [address]:  [address] ordered at discharge: Yes  [address]: [facility]  Phone: [phone]  Fax: [phone]  Orders faxed: No-they are able to pull orders      The Plan for Transition of Care is related to the following treatment goals of Osteoarthrosis, hip [M16.9]  Status post total hip replacement, right [L53.447]    The Patient and/or patient representative Jose De Jesus Burton and her family were provided with a choice of provider and agrees with the discharge plan Yes    Freedom of choice list was provided with basic dialogue that supports the patient's individualized plan of care/goals and shares the quality data associated with the providers.  Yes    Care Transitions patient: No    Noel Morris RN  [facility] [employer]  Case Management Department  Ph: [phone]  Fax: [phone]

## 2020-08-11 NOTE — PROGRESS NOTES
4 Eyes Admission Assessment     I agree as the admission nurse that 2 RN's have performed a thorough Head to Toe Skin Assessment on the patient. ALL assessment sites listed below have been assessed on admission. Areas assessed by both nurses:   [x]   Head, Face, and Ears   [x]   Shoulders, Back, and Chest  [x]   Arms, Elbows, and Hands   [x]   Coccyx, Sacrum, and Ischum  [x]   Legs, Feet, and Heels        Does the Patient have Skin Breakdown?   No         Lázaro Prevention initiated:  Yes   Wound Care Orders initiated:  No      [facility] nurse consulted for Pressure Injury (Stage 3,4, Unstageable, DTI, NWPT, and Complex wounds):  No      Nurse 1 eSignature: Electronically signed by Radha Aaron RN on 8/10/20 at 11:52 PM EDT    **SHARE this note so that the co-signing nurse is able to place an eSignature**    Nurse 2 eSignature: Electronically signed by Noah Reyez RN on 8/11/20 at 6:07 AM EDT

## 2020-08-11 NOTE — DISCHARGE SUMMARY
Department of Orthopedic Surgery  Physician Assistant   Discharge Summary    The Adriana Thomason is a 45 y.o. female admitted for right hip osteoarthritis. Adriana Thomason was admitted to the floor following Her recovery in the PACU.      Discharge Diagnosis  right right hip total arthroplasty  Acute postoperative anemia    Current Inpatient Medications    Current Facility-Administered Medications: Dexamethasone Sodium Phosphate injection 10 mg, 10 mg, Intravenous, Once  ortho mix (with morphine) injection, , Injection, On Call  acyclovir (ZOVIRAX) capsule 400 mg, 400 mg, Oral, Daily  cetirizine (ZYRTEC) tablet 10 mg, 10 mg, Oral, Daily  cycloSPORINE (RESTASIS) 0.05 % ophthalmic emulsion 1 drop, 1 drop, Both Eyes, BID  DULoxetine (CYMBALTA) extended release capsule 60 mg, 60 mg, Oral, Daily  fluticasone (FLONASE) 50 MCG/ACT nasal spray 1 spray, 1 spray, Each Nostril, Daily  gabapentin (NEURONTIN) capsule 400 mg, 400 mg, Oral, TID  meloxicam (MOBIC) tablet 15 mg, 15 mg, Oral, Daily  nitrofurantoin (macrocrystal-monohydrate) (MACROBID) capsule 100 mg, 100 mg, Oral, BID  pantoprazole (PROTONIX) tablet 40 mg, 40 mg, Oral, QAM AC  polyethylene glycol (GLYCOLAX) packet 17 g, 17 g, Oral, Daily  tiZANidine (ZANAFLEX) tablet 4 mg, 4 mg, Oral, Q6H PRN  vitamin B-12 (CYANOCOBALAMIN) tablet 1,000 mcg, 1,000 mcg, Oral, Daily  predniSONE (DELTASONE) tablet 10 mg, 10 mg, Oral, Daily  zolpidem (AMBIEN) tablet 5 mg, 5 mg, Oral, Nightly PRN  sodium chloride flush 0.9 % injection 10 mL, 10 mL, Intravenous, 2 times per day  sodium chloride flush 0.9 % injection 10 mL, 10 mL, Intravenous, PRN  acetaminophen (TYLENOL) tablet 650 mg, 650 mg, Oral, Q6H  sennosides-docusate sodium (SENOKOT-S) 8.6-50 MG tablet 1 tablet, 1 tablet, Oral, BID  0.45 % sodium chloride infusion, , Intravenous, Continuous  ceFAZolin (ANCEF) 2 g in dextrose 5 % 50 mL IVPB, 2 g, Intravenous, Q8H  oxyCODONE (ROXICODONE) immediate release tablet 5 mg, 5 mg, Oral, Q4H PRN **OR** oxyCODONE (ROXICODONE) immediate release tablet 10 mg, 10 mg, Oral, Q4H PRN  HYDROmorphone (DILAUDID) injection 0.25 mg, 0.25 mg, Intravenous, Q3H PRN **OR** HYDROmorphone (DILAUDID) injection 0.5 mg, 0.5 mg, Intravenous, Q3H PRN  bisacodyl (DULCOLAX) EC tablet 5 mg, 5 mg, Oral, Daily PRN  promethazine (PHENERGAN) tablet 12.5 mg, 12.5 mg, Oral, Q6H PRN **OR** ondansetron (ZOFRAN) injection 4 mg, 4 mg, Intravenous, Q6H PRN  aspirin EC tablet 325 mg, 325 mg, Oral, BID  lisinopril (PRINIVIL;ZESTRIL) tablet 20 mg, 20 mg, Oral, Daily  hydroCHLOROthiazide (HYDRODIURIL) tablet 25 mg, 25 mg, Oral, Daily    Post-operatively the patients diet was advanced as tolerated and their dressing was changed on POD #1. The incision is dressing in place, clean, dry and intact with no signs of infection. The patient remained neurovascularly intact in the right lower and had intact pulses distally. Patients calf remained soft and showed no evidence of DVT. The patient was able to move their right lower extremity without any problems post-operatively. Physical therapy and occupational therapy were consulted and began working with the patient post-operatively. The patient progressed with PT/OT as would be expected and continued to improve through their stay. The patients pain was initially controlled with IV medications but we were able to transition to oral pain medications soon after arrival to the floor and their pain remained under good control through their hospital stay. From a medical standpoint the patient remained stable and continued to have the medicine team follow throughout their stay. The patient will be discharged at this time to Home  with their current diet restrictions and will continue to follow the precautions outlined to them by us and PT/OT. Condition on Discharge: Stable    Plan  Return visit in 10 days. .  Patient was instructed on the use of pain medications, the signs and symptoms of infection, and was given our number to call should they have any questions or concerns following discharge.   We will continue to monitor the anemia with laboratory evaluation and prenatal vitamin supplementation    Electronically signed by ERICA Crockett on 8/11/2020 at 10:25 AM

## 2020-08-11 NOTE — PROGRESS NOTES
Pt admitted to room 5528 from the PACU s/p KYREE. Pt is alert and oriented. Admission completed and 4 eye done. Pt given a health choice meal, no n/v noted. Pt yet to void. All safety precautions in place. Will continue to monitor.

## 2020-08-14 ENCOUNTER — TELEPHONE (OUTPATIENT)
Dept: ORTHOPEDIC SURGERY | Age: 50
End: 2020-08-14

## 2020-08-19 ENCOUNTER — TELEPHONE (OUTPATIENT)
Dept: ORTHOPEDIC SURGERY | Age: 50
End: 2020-08-19

## 2020-08-20 ENCOUNTER — OFFICE VISIT (OUTPATIENT)
Dept: ORTHOPEDIC SURGERY | Age: 50
End: 2020-08-20

## 2020-08-20 VITALS
TEMPERATURE: 97.9 F | SYSTOLIC BLOOD PRESSURE: 134 MMHG | HEIGHT: 64 IN | BODY MASS INDEX: 37.9 KG/M2 | DIASTOLIC BLOOD PRESSURE: 82 MMHG | HEART RATE: 77 BPM | WEIGHT: 222 LBS

## 2020-08-20 PROCEDURE — 99024 POSTOP FOLLOW-UP VISIT: CPT | Performed by: PHYSICIAN ASSISTANT

## 2020-08-20 NOTE — PROGRESS NOTES
Patient Name: Med Green MRN: <[id number]>   Age: 45 y.o. YOB: 1970   Sex: female         [address] Complaint   Patient presents with    Post-Op Check     RT KYREE 8/10/20       HISTORY OF PRESENT ILLNESS   Patient returns for their first postoperative evaluation status post right total hip replacement. The patient is doing very well. They have minimal complaints of pain. His pain is 1 out of 10    There is moderate swelling about the Hip. The patient has been doing home physical therapy. They deny any calf swelling or numbness or tingling down the leg     Assessment   PHYSICAL EXAM   Vital Signs:    Vitals:    08/20/20 1106   BP: 134/82   Pulse: 77   Temp: 97.9 °F (36.6 °C)       Examination of the hip shows a well-healed incision. Edges are well opposed. There is mild swelling. There is no drainage. Range of motion is up to 110. There is no calf tenderness. The patient is neurovascularly intact. No signs of DVT. Calf nontender    Gross distal sensation is decreased in the lateral thigh from the area of the incision as expected. RADIOLOGY   X-Rays reviewed: 2 views of the right hip AP and lateral views were taken and reviewed today x-rays of the hip show excellent alignment of the total hip prosthesis. There is no loosening no fractures noted. IMPRESSION   2 weeks status post right total hip replacement    PLAN   The patient is doing well 2 weeks status post right total hip replacement. ICD-10-CM    1. Status post total hip replacement, right  Z96.641 XR HIP 2-3 VW W PELVIS RIGHT     We will continue home physical therapy for aggressive range of motion and strengthening. They will continue ice and elevation for the hip. They will continue aspirin therapy for DVT prophylaxis. Advised patient to follow-up in 4 weeks for repeat evaluation.

## 2020-08-21 ENCOUNTER — APPOINTMENT (OUTPATIENT)
Dept: GENERAL RADIOLOGY | Age: 50
End: 2020-08-21
Payer: COMMERCIAL

## 2020-08-21 ENCOUNTER — HOSPITAL ENCOUNTER (EMERGENCY)
Age: 50
Discharge: HOME | End: 2020-08-21
Attending: EMERGENCY MEDICINE
Payer: COMMERCIAL

## 2020-08-21 VITALS
HEART RATE: 84 BPM | RESPIRATION RATE: 16 BRPM | HEIGHT: 64 IN | TEMPERATURE: 98.4 F | DIASTOLIC BLOOD PRESSURE: 70 MMHG | WEIGHT: 230 LBS | SYSTOLIC BLOOD PRESSURE: 120 MMHG | OXYGEN SATURATION: 99 % | BODY MASS INDEX: 39.27 KG/M2

## 2020-08-21 LAB
EKG ATRIAL RATE: 79 BPM
EKG DIAGNOSIS: NORMAL
EKG P AXIS: 62 DEGREES
EKG P-R INTERVAL: 144 MS
EKG Q-T INTERVAL: 392 MS
EKG QRS DURATION: 80 MS
EKG QTC CALCULATION (BAZETT): 449 MS
EKG R AXIS: 59 DEGREES
EKG T AXIS: 101 DEGREES
EKG VENTRICULAR RATE: 79 BPM

## 2020-08-21 PROCEDURE — 99283 EMERGENCY DEPT VISIT LOW MDM: CPT

## 2020-08-21 PROCEDURE — 73502 X-RAY EXAM HIP UNI 2-3 VIEWS: CPT

## 2020-08-21 PROCEDURE — 93005 ELECTROCARDIOGRAM TRACING: CPT | Performed by: EMERGENCY MEDICINE

## 2020-08-21 PROCEDURE — 93010 ELECTROCARDIOGRAM REPORT: CPT | Performed by: INTERNAL MEDICINE

## 2020-08-21 ASSESSMENT — PAIN SCALES - GENERAL: PAINLEVEL_OUTOF10: 10

## 2020-08-21 NOTE — ED PROVIDER NOTES
10 days    CETIRIZINE (ZYRTEC) 10 MG TABLET    Take 10 mg by mouth daily    CYCLOSPORINE (RESTASIS) 0.05 % OPHTHALMIC EMULSION    1 drop 2 times daily    DULOXETINE (CYMBALTA) 60 MG CAPSULE    Take 60 mg by mouth daily. GABAPENTIN (NEURONTIN) 400 MG CAPSULE    Take 400 mg by mouth 3 times daily. GLUCOSAMINE HCL-MSM PO    Take  by mouth. LISINOPRIL-HYDROCHLOROTHIAZIDE (PRINZIDE;ZESTORETIC) 20-25 MG PER TABLET    Take 1 tablet by mouth daily. MELOXICAM (MOBIC) 15 MG TABLET    Take 15 mg by mouth daily    NITROFURANTOIN, MACROCRYSTAL-MONOHYDRATE, (MACROBID) 100 MG CAPSULE    Take 100 mg by mouth 2 times daily    OMEPRAZOLE (PRILOSEC) 20 MG DELAYED RELEASE CAPSULE    Take 20 mg by mouth daily    POLYETHYLENE GLYCOL 3350 (MIRALAX PO)    Take  by mouth. PREDNISONE (DELTASONE) 10 MG TABLET    Take 1 tablet by mouth daily for 10 days    TIZANIDINE (ZANAFLEX) 4 MG TABLET    Take 1 tablet by mouth every 8 hours as needed (muscle spasm)    TURMERIC 500 MG CAPS    Take by mouth    VITAMIN B-12 (CYANOCOBALAMIN) 1000 MCG TABLET    Take 1,000 mcg by mouth daily    ZOLPIDEM (AMBIEN) 5 MG TABLET    Take 1 tablet by mouth nightly as needed for Sleep for up to 14 days. ALLERGIES     Adhesive tape    FAMILY HISTORY     History reviewed. No pertinent family history. SOCIAL HISTORY       Social History     Socioeconomic History    Marital status:      Spouse name: None    Number of children: None    Years of education: None    Highest education level: None   Occupational History    None   Social Needs    Financial resource strain: None    Food insecurity     Worry: None     Inability: None    Transportation needs     Medical: None     Non-medical: None   Tobacco Use    Smoking status: Former Smoker     Last attempt to quit: 10/17/2018     Years since quittin.8    Smokeless tobacco: Never Used   Substance and Sexual Activity    Alcohol use: None     Comment: occassional    Drug use:  Yes Frequency: 7.0 times per week     Types: Marijuana     Comment: Gummies    Sexual activity: None   Lifestyle    Physical activity     Days per week: None     Minutes per session: None    Stress: None   Relationships    Social connections     Talks on phone: None     Gets together: None     Attends [employer] service: None     Active member of club or organization: None     Attends meetings of clubs or organizations: None     Relationship status: None    Intimate partner violence     Fear of current or ex partner: None     Emotionally abused: None     Physically abused: None     Forced sexual activity: None   Other Topics Concern    None   Social History Narrative    None       SCREENINGS             PHYSICAL EXAM       ED Triage Vitals [08/21/20 0127]   BP Temp Temp Source Pulse Resp SpO2 Height Weight   123/73 98.5 °F (36.9 °C) Oral 88 18 100 % 5' 4\" (1.626 m) 230 lb (104.3 kg)       Physical Exam  General appearance: Alert, cooperative, no distress, appears stated age. Head:  Normocephalic, without obvious abnormality, atraumatic. HEENT: Mucous membranes moist.  Neck: Full ROM, trachea midline, no JVD  Lungs: No respiratory distress  Cardiovasular: Perfusing extremities  Abdomen: Nontender, no guarding  Extremities: Full range of motion at the right hip, legs are equal length. 2+ pulses distally. Skin: No rashes or lesions to exposed skin  Neurologic: Alert and oriented x3, motor grossly normal, clear speech    DIAGNOSTIC RESULTS     EKG: EKG shows a normal sinus rhythm without STEMI, arrhythmogenic condition such as Brugada/ARVD.  QTC/VA intervals normal.    IMAGING:   Non-plain film images such as CT, Ultrasound and MRI are read by the radiologist.Plain radiographic images are visualized and preliminarily interpreted by the emergency physician with the below findings:    No fx or dislocation or disruption of orthopedic hardware    Interpretation per the Radiologist below, if available at the time of this note:    XR HIP RIGHT (2-3 VIEWS)   Final Result   Intact right hip prosthesis without evidence of hardware complication. Left hip and bony pelvis are intact. EDBEDSIDE ULTRASOUND:   Performed by Miles Leung - none    LABS:  Labs Reviewed - No data to display    All other labs were within normal range or not returned as of this dictation. EMERGENCY DEPARTMENT COURSE and DIFFERENTIAL DIAGNOSIS/MDM:   Vitals:    Vitals:    08/21/20 0127   BP: 123/73   Pulse: 88   Resp: 18   Temp: 98.5 °F (36.9 °C)   TempSrc: Oral   SpO2: 100%   Weight: 230 lb (104.3 kg)   Height: 5' 4\" (1.626 m)       Medications - No data to display    MDM     Patient presents with hip pain and syncope. Vital signs are stable, examination shows patient in no acute distress with full range of motion of the hip although there is some pain when externally rotating. We will get x-rays of the hip and I will get an EKG due to the syncope although I think this is a very low risk history with the patient having prodromal dizziness, associated with causative pain, no chest pain, no confusion. X-rays negative. EKG shows no arrhythmogenic conditions or other abnormalities. Overall very low risk story for syncope. Patient will be able to be discharged home. REASSESSMENT          CRITICAL CARE TIME   Critical Care time was 0 minutes, excluding separately reportable procedures. There was a high probability of clinically significant/life threatening deteriorationin the patient's condition which required my urgent intervention. CONSULTS:  None     PROCEDURES:  Unless otherwise noted below, none     Procedures    FINAL IMPRESSION      1.  Right hip pain          DISPOSITION/PLAN   DISPOSITION Decision To Discharge 08/21/2020 02:25:43 AM      PATIENT REFERRED TO:  [facility] ED  [address]  [phone]    If symptoms worsen      DISCHARGE MEDICATIONS:  New Prescriptions    No medications on file          (Please note that portions of this note were completed with a voicerecognition program.  Efforts were made to edit the dictations but occasionally words are mis-transcribed.)    Isabel Swann MD (electronically signed)  Attending Emergency Physician            Isabel Swann MD  08/21/20 2505

## 2020-09-03 ENCOUNTER — OFFICE VISIT (OUTPATIENT)
Dept: ORTHOPEDIC SURGERY | Age: 50
End: 2020-09-03

## 2020-09-03 VITALS
BODY MASS INDEX: 39.27 KG/M2 | DIASTOLIC BLOOD PRESSURE: 80 MMHG | HEIGHT: 64 IN | WEIGHT: 230 LBS | HEART RATE: 86 BPM | TEMPERATURE: 97.5 F | SYSTOLIC BLOOD PRESSURE: 129 MMHG

## 2020-09-03 PROCEDURE — 99024 POSTOP FOLLOW-UP VISIT: CPT | Performed by: PHYSICIAN ASSISTANT

## 2020-09-03 NOTE — PROGRESS NOTES
Patient Name: Eloise Cagle MRN: <[id number]>   Age: 45 y.o. YOB: 1970   Sex: female         [address] Complaint   Patient presents with    Post-Op Check     rt corrina 8/10/20       HISTORY OF PRESENT ILLNESS   Patient returns for their second postoperative evaluation status post right total hip replacement. The patient is doing very well. They have minimal complaints of pain. Her pain is 1 out of 10  She notes that she did have an incident at home where she was sitting in bed she felt the sudden pain in the anterior aspect of her right thigh and then she had a syncopal episode due to significant pain she did go to the emergency room and got x-rays but there were no noted fractures or dislocations. She notes that over the week the pain did improve and her motion did improve as well. There is mild swelling about the Hip. The patient has been doing home physical therapy exercises on his own. They deny any calf swelling or numbness or tingling down the leg     Assessment   PHYSICAL EXAM   Vital Signs:    Vitals:    09/03/20 1430   BP: 129/80   Pulse: 86   Temp: 97.5 °F (36.4 °C)       Examination of the hip shows a well-healed incision. Edges are well opposed. There is mild swelling. There is no drainage. Range of motion is up to 110. There is no calf tenderness. The patient is neurovascularly intact. No signs of DVT. Calf nontender    Gross distal sensation is decreased in the lateral thigh from the area of the incision as expected. RADIOLOGY   X-Rays reviewed: 2 views of the right hip AP and lateral views performed on 8/21/2020 were taken and reviewed today x-rays of the hip show excellent alignment of the total hip prosthesis. There is no loosening no fractures noted. IMPRESSION   4 weeks status post right total hip replacement    PLAN   The patient is doing well 4 weeks status post right total hip replacement.        ICD-10-CM    1. Status post total hip replacement, right  Z96.641      We will continue home physical therapy for aggressive range of motion and strengthening. They will continue ice and elevation for the hip. They will continue aspirin therapy for DVT prophylaxis. Drop to aspirin 325 1 times daily for 2 weeks and then stop  Advised patient to follow-up in 4 weeks for repeat evaluation.

## 2020-10-01 ENCOUNTER — OFFICE VISIT (OUTPATIENT)
Dept: ORTHOPEDIC SURGERY | Age: 50
End: 2020-10-01

## 2020-10-01 VITALS
WEIGHT: 230 LBS | HEIGHT: 64 IN | BODY MASS INDEX: 39.27 KG/M2 | DIASTOLIC BLOOD PRESSURE: 86 MMHG | TEMPERATURE: 97.4 F | SYSTOLIC BLOOD PRESSURE: 138 MMHG | HEART RATE: 79 BPM

## 2020-10-01 PROCEDURE — 99024 POSTOP FOLLOW-UP VISIT: CPT | Performed by: PHYSICIAN ASSISTANT

## 2020-10-01 RX ORDER — ASPIRIN 81 MG/1
81 TABLET, CHEWABLE ORAL DAILY
Status: ON HOLD | COMMUNITY
End: 2021-03-02 | Stop reason: HOSPADM

## 2020-10-01 NOTE — PROGRESS NOTES
Patient Name: Maricarmen Miranda MRN: <[id number]>   Age: 45 y.o. YOB: 1970   Sex: female         [address] Complaint   Patient presents with    Post-Op Check     RT KYREE 8/10/20       HISTORY OF PRESENT ILLNESS   Patient returns for their third postoperative evaluation status post right total hip replacement. The patient is doing very well. They have minimal complaints of pain. Her pain is 1 out of 10  She notes overall she is doing very well she does going through work with limited to no difficulties and has been able to get in and out of vehicles much better. There is mild swelling about the Hip. The patient has been doing home physical therapy exercises on his own. They deny any calf swelling or numbness or tingling down the leg     Assessment   PHYSICAL EXAM   Vital Signs:    Vitals:    10/01/20 1456   BP: 138/86   Pulse: 79   Temp: 97.4 °F (36.3 °C)       Examination of the hip shows a well-healed incision. Edges are well opposed. There is mild swelling. There is no drainage. Range of motion is up to 110. There is no calf tenderness. The patient is neurovascularly intact. No signs of DVT. Calf nontender    Gross distal sensation is decreased in the lateral thigh from the area of the incision as expected. RADIOLOGY   X-Rays reviewed: 2 views of the right hip AP and lateral views performed on today were taken and reviewed today x-rays of the hip show excellent alignment of the total hip prosthesis. There is no loosening no fractures noted. IMPRESSION   8 weeks status post right total hip replacement    PLAN   The patient is doing well 8weeks status post right total hip replacement. ICD-10-CM    1. Status post total hip replacement, right  Z96.641 XR HIP RIGHT (2-3 VIEWS)     We will continue home physical therapy for aggressive range of motion and strengthening. They will continue ice and elevation for the hip.     They will continue aspirin therapy for DVT prophylaxis. Advised patient to follow-up in 3 months for repeat evaluation.

## 2021-01-03 NOTE — ANESTHESIA POSTPROCEDURE EVALUATION
Department of Anesthesiology  Postprocedure Note    Patient: Valeria Wells  MRN: [id number]  YOB: 1970  Date of evaluation: 8/10/2020  Time:  7:27 PM     Procedure Summary     Date:  08/10/20 Room / Location:  [address] [place] [address] [address] / [facility]    Anesthesia Start:  8978 Anesthesia Stop:  1816    Procedure:  RIGHT TOTAL HIP ARTHROPLASTY ANTERIOR APPROACH (Right Hip) Diagnosis:       Osteoarthrosis, hip      (Osteoarthrosis, hip [M16.9])    Surgeon:  Art Alvarenga MD Responsible Provider:  Nixon Murillo DO    Anesthesia Type:  general ASA Status:  2          Anesthesia Type: general    Gladys Phase I: Gladys Score: 8    Gladys Phase II:      Last vitals: Reviewed and per EMR flowsheets.        Anesthesia Post Evaluation    Patient location during evaluation: PACU  Patient participation: complete - patient participated  Level of consciousness: awake  Pain score: 8  Airway patency: patent  Nausea & Vomiting: no nausea and no vomiting  Complications: no  Cardiovascular status: blood pressure returned to baseline  Respiratory status: acceptable  Hydration status: euvolemic
03-Jan-2021 12:16

## 2021-01-07 ENCOUNTER — OFFICE VISIT (OUTPATIENT)
Dept: ORTHOPEDIC SURGERY | Age: 51
End: 2021-01-07
Payer: COMMERCIAL

## 2021-01-07 VITALS
TEMPERATURE: 96.8 F | HEART RATE: 81 BPM | HEIGHT: 64 IN | SYSTOLIC BLOOD PRESSURE: 148 MMHG | DIASTOLIC BLOOD PRESSURE: 84 MMHG | WEIGHT: 230 LBS | BODY MASS INDEX: 39.27 KG/M2

## 2021-01-07 DIAGNOSIS — Z96.641 STATUS POST TOTAL HIP REPLACEMENT, RIGHT: Primary | ICD-10-CM

## 2021-01-07 DIAGNOSIS — M25.551 RIGHT HIP PAIN: ICD-10-CM

## 2021-01-07 PROCEDURE — 99213 OFFICE O/P EST LOW 20 MIN: CPT | Performed by: ORTHOPAEDIC SURGERY

## 2021-01-07 RX ORDER — METOPROLOL SUCCINATE 25 MG/1
25 TABLET, EXTENDED RELEASE ORAL NIGHTLY
COMMUNITY
Start: 2021-01-04

## 2021-01-07 NOTE — PROGRESS NOTES
Patient Name: Magdi Ham MRN: <[id number]>   Age: 45 y.o. YOB: 1970   Sex: female         [address] Complaint   Patient presents with    Hip Pain     RT KYREE 8/10/20       HISTORY OF PRESENT ILLNESS   Patient returns for their postoperative evaluation status post total hip replacement. The patient is doing very well. They have minimal complaints of pain. There is moderate swelling about the Hip. The patient has been doing home physical therapy. They deny any calf swelling or numbness or tingling down the leg    Some complaints of lateral hip pain that is been slowly resolving     Assessment   PHYSICAL EXAM   Vital Signs:    Vitals:    01/07/21 1531   BP: (!) 148/84   Pulse: 81   Temp:      Examination of the hip shows a well-healed incision. There is mild swelling. There is no drainage. Range of motion is 0-100°. There is no calf tenderness. The patient is neurovascularly intact. Gait still shows small limp. RADIOLOGY     Xray   Have reviewed the xrays above from previously  and my impression is:  X-Rays reviewed: AP and lateral x-rays of the hip show excellent alignment of the total hip prosthesis. There is no loosening no fractures noted. IMPRESSION   4-5 months status post total hip replacement    PLAN   The patient is doing well 4-5 month status post total hip replacement. The patient will slowly resume normal activities. ICD-10-CM    1. Status post total hip replacement, right  Z96.641    2.  Right hip pain  M25.551    Given her exercise for single-leg stance as well as abduction and extension exercises for the trochanteric insertional pain

## 2021-02-26 ENCOUNTER — ANESTHESIA (OUTPATIENT)
Dept: OPERATING ROOM | Age: 51
DRG: 468 | End: 2021-02-26
Payer: COMMERCIAL

## 2021-02-26 ENCOUNTER — HOSPITAL ENCOUNTER (EMERGENCY)
Age: 51
Discharge: ANOTHER HOSPITAL | End: 2021-02-26
Attending: EMERGENCY MEDICINE
Payer: COMMERCIAL

## 2021-02-26 ENCOUNTER — HOSPITAL ENCOUNTER (INPATIENT)
Age: 51
LOS: 4 days | Discharge: HOME | DRG: 468 | End: 2021-03-02
Attending: ORTHOPAEDIC SURGERY | Admitting: ORTHOPAEDIC SURGERY
Payer: COMMERCIAL

## 2021-02-26 ENCOUNTER — APPOINTMENT (OUTPATIENT)
Dept: GENERAL RADIOLOGY | Age: 51
End: 2021-02-26
Payer: COMMERCIAL

## 2021-02-26 ENCOUNTER — APPOINTMENT (OUTPATIENT)
Dept: GENERAL RADIOLOGY | Age: 51
DRG: 468 | End: 2021-02-26
Attending: ORTHOPAEDIC SURGERY
Payer: COMMERCIAL

## 2021-02-26 ENCOUNTER — ANESTHESIA EVENT (OUTPATIENT)
Dept: OPERATING ROOM | Age: 51
DRG: 468 | End: 2021-02-26
Payer: COMMERCIAL

## 2021-02-26 VITALS — SYSTOLIC BLOOD PRESSURE: 215 MMHG | DIASTOLIC BLOOD PRESSURE: 138 MMHG | OXYGEN SATURATION: 98 %

## 2021-02-26 VITALS
DIASTOLIC BLOOD PRESSURE: 99 MMHG | WEIGHT: 230 LBS | HEIGHT: 64 IN | TEMPERATURE: 98 F | SYSTOLIC BLOOD PRESSURE: 175 MMHG | RESPIRATION RATE: 12 BRPM | BODY MASS INDEX: 39.27 KG/M2 | OXYGEN SATURATION: 98 % | HEART RATE: 78 BPM

## 2021-02-26 DIAGNOSIS — S73.004A CLOSED DISLOCATION OF RIGHT HIP, INITIAL ENCOUNTER (HCC): Primary | ICD-10-CM

## 2021-02-26 DIAGNOSIS — Z96.649 STATUS POST REVISION OF TOTAL HIP: Primary | ICD-10-CM

## 2021-02-26 PROBLEM — S73.004S HIP DISLOCATION, RIGHT, SEQUELA: Status: ACTIVE | Noted: 2021-02-26

## 2021-02-26 LAB
ABO/RH: NORMAL
ANION GAP SERPL CALCULATED.3IONS-SCNC: 8 MMOL/L (ref 3–16)
ANTIBODY SCREEN: NORMAL
BASOPHILS ABSOLUTE: 0.1 K/UL (ref 0–0.2)
BASOPHILS RELATIVE PERCENT: 1 %
BUN BLDV-MCNC: 17 MG/DL (ref 7–20)
C-REACTIVE PROTEIN: 3.9 MG/L (ref 0–5.1)
CALCIUM SERPL-MCNC: 9.9 MG/DL (ref 8.3–10.6)
CHLORIDE BLD-SCNC: 103 MMOL/L (ref 99–110)
CO2: 27 MMOL/L (ref 21–32)
CREAT SERPL-MCNC: 0.7 MG/DL (ref 0.6–1.1)
EKG ATRIAL RATE: 63 BPM
EKG DIAGNOSIS: NORMAL
EKG P AXIS: 49 DEGREES
EKG P-R INTERVAL: 142 MS
EKG Q-T INTERVAL: 406 MS
EKG QRS DURATION: 82 MS
EKG QTC CALCULATION (BAZETT): 415 MS
EKG R AXIS: 38 DEGREES
EKG T AXIS: 72 DEGREES
EKG VENTRICULAR RATE: 63 BPM
EOSINOPHILS ABSOLUTE: 0.1 K/UL (ref 0–0.6)
EOSINOPHILS RELATIVE PERCENT: 1.9 %
GFR AFRICAN AMERICAN: >60
GFR NON-AFRICAN AMERICAN: >60
GLUCOSE BLD-MCNC: 146 MG/DL (ref 70–99)
HCT VFR BLD CALC: 44.9 % (ref 36–48)
HEMOGLOBIN: 15 G/DL (ref 12–16)
LYMPHOCYTES ABSOLUTE: 1.4 K/UL (ref 1–5.1)
LYMPHOCYTES RELATIVE PERCENT: 23 %
MCH RBC QN AUTO: 31.5 PG (ref 26–34)
MCHC RBC AUTO-ENTMCNC: 33.4 G/DL (ref 31–36)
MCV RBC AUTO: 94.6 FL (ref 80–100)
MONOCYTES ABSOLUTE: 0.4 K/UL (ref 0–1.3)
MONOCYTES RELATIVE PERCENT: 7.2 %
NEUTROPHILS ABSOLUTE: 4.2 K/UL (ref 1.7–7.7)
NEUTROPHILS RELATIVE PERCENT: 66.9 %
PDW BLD-RTO: 15.7 % (ref 12.4–15.4)
PLATELET # BLD: 348 K/UL (ref 135–450)
PMV BLD AUTO: 7.7 FL (ref 5–10.5)
POTASSIUM REFLEX MAGNESIUM: 4.6 MMOL/L (ref 3.5–5.1)
RBC # BLD: 4.75 M/UL (ref 4–5.2)
SEDIMENTATION RATE, ERYTHROCYTE: 35 MM/HR (ref 0–30)
SODIUM BLD-SCNC: 138 MMOL/L (ref 136–145)
WBC # BLD: 6.2 K/UL (ref 4–11)

## 2021-02-26 PROCEDURE — 1200000000 HC SEMI PRIVATE

## 2021-02-26 PROCEDURE — 73501 X-RAY EXAM HIP UNI 1 VIEW: CPT

## 2021-02-26 PROCEDURE — 3700000000 HC ANESTHESIA ATTENDED CARE: Performed by: ORTHOPAEDIC SURGERY

## 2021-02-26 PROCEDURE — 94150 VITAL CAPACITY TEST: CPT

## 2021-02-26 PROCEDURE — 6370000000 HC RX 637 (ALT 250 FOR IP): Performed by: INTERNAL MEDICINE

## 2021-02-26 PROCEDURE — 73502 X-RAY EXAM HIP UNI 2-3 VIEWS: CPT

## 2021-02-26 PROCEDURE — 96375 TX/PRO/DX INJ NEW DRUG ADDON: CPT

## 2021-02-26 PROCEDURE — 3600000015 HC SURGERY LEVEL 5 ADDTL 15MIN: Performed by: ORTHOPAEDIC SURGERY

## 2021-02-26 PROCEDURE — 6360000002 HC RX W HCPCS: Performed by: EMERGENCY MEDICINE

## 2021-02-26 PROCEDURE — 86850 RBC ANTIBODY SCREEN: CPT

## 2021-02-26 PROCEDURE — 2709999900 HC NON-CHARGEABLE SUPPLY: Performed by: ORTHOPAEDIC SURGERY

## 2021-02-26 PROCEDURE — 3600000005 HC SURGERY LEVEL 5 BASE: Performed by: ORTHOPAEDIC SURGERY

## 2021-02-26 PROCEDURE — G0379 DIRECT REFER HOSPITAL OBSERV: HCPCS

## 2021-02-26 PROCEDURE — 96376 TX/PRO/DX INJ SAME DRUG ADON: CPT

## 2021-02-26 PROCEDURE — 85025 COMPLETE CBC W/AUTO DIFF WBC: CPT

## 2021-02-26 PROCEDURE — 86900 BLOOD TYPING SEROLOGIC ABO: CPT

## 2021-02-26 PROCEDURE — 99285 EMERGENCY DEPT VISIT HI MDM: CPT

## 2021-02-26 PROCEDURE — 6360000002 HC RX W HCPCS: Performed by: NURSE ANESTHETIST, CERTIFIED REGISTERED

## 2021-02-26 PROCEDURE — 2580000003 HC RX 258: Performed by: NURSE ANESTHETIST, CERTIFIED REGISTERED

## 2021-02-26 PROCEDURE — 96374 THER/PROPH/DIAG INJ IV PUSH: CPT

## 2021-02-26 PROCEDURE — 85652 RBC SED RATE AUTOMATED: CPT

## 2021-02-26 PROCEDURE — G0378 HOSPITAL OBSERVATION PER HR: HCPCS

## 2021-02-26 PROCEDURE — 27266 TREAT HIP DISLOCATION: CPT | Performed by: ORTHOPAEDIC SURGERY

## 2021-02-26 PROCEDURE — 3700000001 HC ADD 15 MINUTES (ANESTHESIA): Performed by: ORTHOPAEDIC SURGERY

## 2021-02-26 PROCEDURE — 27250 TREAT HIP DISLOCATION: CPT

## 2021-02-26 PROCEDURE — 86901 BLOOD TYPING SEROLOGIC RH(D): CPT

## 2021-02-26 PROCEDURE — 0SW9XJZ REVISION OF SYNTHETIC SUBSTITUTE IN RIGHT HIP JOINT, EXTERNAL APPROACH: ICD-10-PCS | Performed by: ORTHOPAEDIC SURGERY

## 2021-02-26 PROCEDURE — 86140 C-REACTIVE PROTEIN: CPT

## 2021-02-26 PROCEDURE — 6370000000 HC RX 637 (ALT 250 FOR IP): Performed by: PHYSICIAN ASSISTANT

## 2021-02-26 PROCEDURE — 2500000003 HC RX 250 WO HCPCS: Performed by: NURSE ANESTHETIST, CERTIFIED REGISTERED

## 2021-02-26 PROCEDURE — 80048 BASIC METABOLIC PNL TOTAL CA: CPT

## 2021-02-26 PROCEDURE — 2580000003 HC RX 258: Performed by: EMERGENCY MEDICINE

## 2021-02-26 PROCEDURE — 2580000003 HC RX 258: Performed by: PHYSICIAN ASSISTANT

## 2021-02-26 PROCEDURE — 7100000000 HC PACU RECOVERY - FIRST 15 MIN: Performed by: ORTHOPAEDIC SURGERY

## 2021-02-26 PROCEDURE — 93010 ELECTROCARDIOGRAM REPORT: CPT | Performed by: INTERNAL MEDICINE

## 2021-02-26 PROCEDURE — 96365 THER/PROPH/DIAG IV INF INIT: CPT

## 2021-02-26 PROCEDURE — 7100000001 HC PACU RECOVERY - ADDTL 15 MIN: Performed by: ORTHOPAEDIC SURGERY

## 2021-02-26 PROCEDURE — 71045 X-RAY EXAM CHEST 1 VIEW: CPT

## 2021-02-26 PROCEDURE — 93005 ELECTROCARDIOGRAM TRACING: CPT | Performed by: EMERGENCY MEDICINE

## 2021-02-26 RX ORDER — LANOLIN ALCOHOL/MO/W.PET/CERES
1000 CREAM (GRAM) TOPICAL DAILY
Status: DISCONTINUED | OUTPATIENT
Start: 2021-02-27 | End: 2021-03-02 | Stop reason: HOSPADM

## 2021-02-26 RX ORDER — PROCHLORPERAZINE EDISYLATE 5 MG/ML
5 INJECTION INTRAMUSCULAR; INTRAVENOUS
Status: DISCONTINUED | OUTPATIENT
Start: 2021-02-26 | End: 2021-02-26

## 2021-02-26 RX ORDER — DULOXETIN HYDROCHLORIDE 60 MG/1
60 CAPSULE, DELAYED RELEASE ORAL DAILY
Status: DISCONTINUED | OUTPATIENT
Start: 2021-02-27 | End: 2021-03-02 | Stop reason: HOSPADM

## 2021-02-26 RX ORDER — SODIUM CHLORIDE 9 MG/ML
1000 INJECTION, SOLUTION INTRAVENOUS CONTINUOUS
Status: DISCONTINUED | OUTPATIENT
Start: 2021-02-26 | End: 2021-02-26 | Stop reason: HOSPADM

## 2021-02-26 RX ORDER — FENTANYL CITRATE 50 UG/ML
INJECTION, SOLUTION INTRAMUSCULAR; INTRAVENOUS PRN
Status: DISCONTINUED | OUTPATIENT
Start: 2021-02-26 | End: 2021-02-26 | Stop reason: SDUPTHER

## 2021-02-26 RX ORDER — ROCURONIUM BROMIDE 10 MG/ML
INJECTION, SOLUTION INTRAVENOUS PRN
Status: DISCONTINUED | OUTPATIENT
Start: 2021-02-26 | End: 2021-02-26 | Stop reason: SDUPTHER

## 2021-02-26 RX ORDER — LIDOCAINE HYDROCHLORIDE 20 MG/ML
INJECTION, SOLUTION INTRAVENOUS PRN
Status: DISCONTINUED | OUTPATIENT
Start: 2021-02-26 | End: 2021-02-26 | Stop reason: SDUPTHER

## 2021-02-26 RX ORDER — FENTANYL CITRATE 50 UG/ML
50 INJECTION, SOLUTION INTRAMUSCULAR; INTRAVENOUS EVERY 5 MIN PRN
Status: DISCONTINUED | OUTPATIENT
Start: 2021-02-26 | End: 2021-02-26

## 2021-02-26 RX ORDER — SODIUM CHLORIDE 9 MG/ML
INJECTION, SOLUTION INTRAVENOUS CONTINUOUS PRN
Status: DISCONTINUED | OUTPATIENT
Start: 2021-02-26 | End: 2021-02-26 | Stop reason: SDUPTHER

## 2021-02-26 RX ORDER — OXYCODONE HYDROCHLORIDE AND ACETAMINOPHEN 5; 325 MG/1; MG/1
1 TABLET ORAL PRN
Status: DISCONTINUED | OUTPATIENT
Start: 2021-02-26 | End: 2021-02-26

## 2021-02-26 RX ORDER — KETOROLAC TROMETHAMINE 30 MG/ML
30 INJECTION, SOLUTION INTRAMUSCULAR; INTRAVENOUS ONCE
Status: COMPLETED | OUTPATIENT
Start: 2021-02-26 | End: 2021-02-26

## 2021-02-26 RX ORDER — LABETALOL HYDROCHLORIDE 5 MG/ML
5 INJECTION, SOLUTION INTRAVENOUS EVERY 10 MIN PRN
Status: DISCONTINUED | OUTPATIENT
Start: 2021-02-26 | End: 2021-02-26

## 2021-02-26 RX ORDER — CETIRIZINE HYDROCHLORIDE 10 MG/1
10 TABLET ORAL DAILY
Status: DISCONTINUED | OUTPATIENT
Start: 2021-02-27 | End: 2021-03-02 | Stop reason: HOSPADM

## 2021-02-26 RX ORDER — SODIUM CHLORIDE 0.9 % (FLUSH) 0.9 %
10 SYRINGE (ML) INJECTION EVERY 12 HOURS SCHEDULED
Status: DISCONTINUED | OUTPATIENT
Start: 2021-02-26 | End: 2021-03-02 | Stop reason: HOSPADM

## 2021-02-26 RX ORDER — ONDANSETRON 2 MG/ML
4 INJECTION INTRAMUSCULAR; INTRAVENOUS ONCE
Status: COMPLETED | OUTPATIENT
Start: 2021-02-26 | End: 2021-02-26

## 2021-02-26 RX ORDER — POLYVINYL ALCOHOL 14 MG/ML
2 SOLUTION/ DROPS OPHTHALMIC EVERY 4 HOURS PRN
Status: DISCONTINUED | OUTPATIENT
Start: 2021-02-26 | End: 2021-03-02 | Stop reason: HOSPADM

## 2021-02-26 RX ORDER — PANTOPRAZOLE SODIUM 40 MG/1
40 TABLET, DELAYED RELEASE ORAL
Status: DISCONTINUED | OUTPATIENT
Start: 2021-02-27 | End: 2021-03-02 | Stop reason: HOSPADM

## 2021-02-26 RX ORDER — ONDANSETRON 2 MG/ML
INJECTION INTRAMUSCULAR; INTRAVENOUS PRN
Status: DISCONTINUED | OUTPATIENT
Start: 2021-02-26 | End: 2021-02-26 | Stop reason: SDUPTHER

## 2021-02-26 RX ORDER — ONDANSETRON 2 MG/ML
4 INJECTION INTRAMUSCULAR; INTRAVENOUS
Status: DISCONTINUED | OUTPATIENT
Start: 2021-02-26 | End: 2021-02-26

## 2021-02-26 RX ORDER — ONDANSETRON 2 MG/ML
4 INJECTION INTRAMUSCULAR; INTRAVENOUS EVERY 6 HOURS PRN
Status: DISCONTINUED | OUTPATIENT
Start: 2021-02-26 | End: 2021-03-02 | Stop reason: HOSPADM

## 2021-02-26 RX ORDER — SUCCINYLCHOLINE/SOD CL,ISO/PF 200MG/10ML
SYRINGE (ML) INTRAVENOUS PRN
Status: DISCONTINUED | OUTPATIENT
Start: 2021-02-26 | End: 2021-02-26 | Stop reason: SDUPTHER

## 2021-02-26 RX ORDER — OXYCODONE HYDROCHLORIDE AND ACETAMINOPHEN 5; 325 MG/1; MG/1
2 TABLET ORAL PRN
Status: DISCONTINUED | OUTPATIENT
Start: 2021-02-26 | End: 2021-02-26

## 2021-02-26 RX ORDER — METRONIDAZOLE 500 MG/1
500 TABLET ORAL 2 TIMES DAILY
Status: ON HOLD | COMMUNITY
Start: 2021-02-19 | End: 2021-04-20 | Stop reason: HOSPADM

## 2021-02-26 RX ORDER — HYDRALAZINE HYDROCHLORIDE 20 MG/ML
5 INJECTION INTRAMUSCULAR; INTRAVENOUS EVERY 10 MIN PRN
Status: DISCONTINUED | OUTPATIENT
Start: 2021-02-26 | End: 2021-02-26

## 2021-02-26 RX ORDER — MECOBALAMIN 5000 MCG
5 TABLET,DISINTEGRATING ORAL NIGHTLY
Status: DISCONTINUED | OUTPATIENT
Start: 2021-02-26 | End: 2021-03-02 | Stop reason: HOSPADM

## 2021-02-26 RX ORDER — MORPHINE SULFATE 4 MG/ML
4 INJECTION, SOLUTION INTRAMUSCULAR; INTRAVENOUS ONCE
Status: COMPLETED | OUTPATIENT
Start: 2021-02-26 | End: 2021-02-26

## 2021-02-26 RX ORDER — TIZANIDINE 4 MG/1
4 TABLET ORAL EVERY 8 HOURS PRN
Status: DISCONTINUED | OUTPATIENT
Start: 2021-02-26 | End: 2021-03-01 | Stop reason: SDUPTHER

## 2021-02-26 RX ORDER — OXYCODONE HYDROCHLORIDE 5 MG/1
5 TABLET ORAL EVERY 4 HOURS PRN
Status: DISCONTINUED | OUTPATIENT
Start: 2021-02-26 | End: 2021-03-01 | Stop reason: SDUPTHER

## 2021-02-26 RX ORDER — SODIUM CHLORIDE 0.9 % (FLUSH) 0.9 %
10 SYRINGE (ML) INJECTION PRN
Status: DISCONTINUED | OUTPATIENT
Start: 2021-02-26 | End: 2021-03-02 | Stop reason: HOSPADM

## 2021-02-26 RX ORDER — PROMETHAZINE HYDROCHLORIDE 25 MG/1
12.5 TABLET ORAL EVERY 6 HOURS PRN
Status: DISCONTINUED | OUTPATIENT
Start: 2021-02-26 | End: 2021-03-02 | Stop reason: HOSPADM

## 2021-02-26 RX ORDER — DIPHENHYDRAMINE HYDROCHLORIDE 50 MG/ML
12.5 INJECTION INTRAMUSCULAR; INTRAVENOUS
Status: DISCONTINUED | OUTPATIENT
Start: 2021-02-26 | End: 2021-02-26

## 2021-02-26 RX ORDER — LISINOPRIL AND HYDROCHLOROTHIAZIDE 25; 20 MG/1; MG/1
1 TABLET ORAL DAILY
Status: DISCONTINUED | OUTPATIENT
Start: 2021-02-27 | End: 2021-03-02 | Stop reason: HOSPADM

## 2021-02-26 RX ORDER — LABETALOL HYDROCHLORIDE 5 MG/ML
10 INJECTION, SOLUTION INTRAVENOUS EVERY 4 HOURS PRN
Status: DISCONTINUED | OUTPATIENT
Start: 2021-02-26 | End: 2021-03-02 | Stop reason: HOSPADM

## 2021-02-26 RX ORDER — FENTANYL CITRATE 50 UG/ML
25 INJECTION, SOLUTION INTRAMUSCULAR; INTRAVENOUS EVERY 5 MIN PRN
Status: DISCONTINUED | OUTPATIENT
Start: 2021-02-26 | End: 2021-02-26

## 2021-02-26 RX ORDER — PROPOFOL 10 MG/ML
INJECTION, EMULSION INTRAVENOUS PRN
Status: DISCONTINUED | OUTPATIENT
Start: 2021-02-26 | End: 2021-02-26 | Stop reason: SDUPTHER

## 2021-02-26 RX ORDER — SENNA AND DOCUSATE SODIUM 50; 8.6 MG/1; MG/1
1 TABLET, FILM COATED ORAL 2 TIMES DAILY
Status: DISCONTINUED | OUTPATIENT
Start: 2021-02-26 | End: 2021-03-02 | Stop reason: HOSPADM

## 2021-02-26 RX ORDER — GABAPENTIN 400 MG/1
400 CAPSULE ORAL 3 TIMES DAILY
Status: DISCONTINUED | OUTPATIENT
Start: 2021-02-26 | End: 2021-03-02 | Stop reason: HOSPADM

## 2021-02-26 RX ORDER — POLYETHYLENE GLYCOL 3350 17 G/17G
17 POWDER, FOR SOLUTION ORAL DAILY
Status: DISCONTINUED | OUTPATIENT
Start: 2021-02-26 | End: 2021-03-02 | Stop reason: SDUPTHER

## 2021-02-26 RX ORDER — SODIUM CHLORIDE 450 MG/100ML
INJECTION, SOLUTION INTRAVENOUS CONTINUOUS
Status: DISCONTINUED | OUTPATIENT
Start: 2021-02-26 | End: 2021-03-02 | Stop reason: HOSPADM

## 2021-02-26 RX ORDER — ACETAMINOPHEN 325 MG/1
650 TABLET ORAL EVERY 6 HOURS
Status: DISCONTINUED | OUTPATIENT
Start: 2021-02-26 | End: 2021-03-02 | Stop reason: HOSPADM

## 2021-02-26 RX ORDER — METOPROLOL SUCCINATE 50 MG/1
25 TABLET, EXTENDED RELEASE ORAL NIGHTLY
Status: DISCONTINUED | OUTPATIENT
Start: 2021-02-26 | End: 2021-03-02 | Stop reason: HOSPADM

## 2021-02-26 RX ORDER — METRONIDAZOLE 500 MG/1
500 TABLET ORAL 2 TIMES DAILY
Status: DISCONTINUED | OUTPATIENT
Start: 2021-02-26 | End: 2021-03-02 | Stop reason: HOSPADM

## 2021-02-26 RX ORDER — ACYCLOVIR 200 MG/1
800 CAPSULE ORAL DAILY
Status: DISCONTINUED | OUTPATIENT
Start: 2021-02-27 | End: 2021-03-02 | Stop reason: HOSPADM

## 2021-02-26 RX ORDER — PROPOFOL 10 MG/ML
100 INJECTION, EMULSION INTRAVENOUS ONCE
Status: COMPLETED | OUTPATIENT
Start: 2021-02-26 | End: 2021-02-26

## 2021-02-26 RX ORDER — MEPERIDINE HYDROCHLORIDE 25 MG/ML
12.5 INJECTION INTRAMUSCULAR; INTRAVENOUS; SUBCUTANEOUS EVERY 5 MIN PRN
Status: DISCONTINUED | OUTPATIENT
Start: 2021-02-26 | End: 2021-02-26

## 2021-02-26 RX ORDER — OXYCODONE HYDROCHLORIDE 5 MG/1
10 TABLET ORAL EVERY 4 HOURS PRN
Status: DISCONTINUED | OUTPATIENT
Start: 2021-02-26 | End: 2021-03-01 | Stop reason: SDUPTHER

## 2021-02-26 RX ADMIN — ONDANSETRON HYDROCHLORIDE 4 MG: 2 INJECTION, SOLUTION INTRAMUSCULAR; INTRAVENOUS at 08:16

## 2021-02-26 RX ADMIN — LIDOCAINE HYDROCHLORIDE 100 MG: 20 INJECTION, SOLUTION INTRAVENOUS at 18:16

## 2021-02-26 RX ADMIN — Medication 10 ML: at 22:04

## 2021-02-26 RX ADMIN — METOPROLOL SUCCINATE 25 MG: 50 TABLET, EXTENDED RELEASE ORAL at 22:03

## 2021-02-26 RX ADMIN — METRONIDAZOLE 500 MG: 500 TABLET ORAL at 22:03

## 2021-02-26 RX ADMIN — Medication 5 MG: at 23:21

## 2021-02-26 RX ADMIN — SODIUM CHLORIDE: 9 INJECTION, SOLUTION INTRAVENOUS at 18:16

## 2021-02-26 RX ADMIN — GABAPENTIN 400 MG: 400 CAPSULE ORAL at 22:03

## 2021-02-26 RX ADMIN — MORPHINE SULFATE 4 MG: 4 INJECTION, SOLUTION INTRAMUSCULAR; INTRAVENOUS at 08:16

## 2021-02-26 RX ADMIN — ONDANSETRON 4 MG: 2 INJECTION INTRAMUSCULAR; INTRAVENOUS at 18:26

## 2021-02-26 RX ADMIN — MORPHINE SULFATE 4 MG: 4 INJECTION, SOLUTION INTRAMUSCULAR; INTRAVENOUS at 10:56

## 2021-02-26 RX ADMIN — FENTANYL CITRATE 100 MCG: 50 INJECTION, SOLUTION INTRAMUSCULAR; INTRAVENOUS at 18:16

## 2021-02-26 RX ADMIN — PROPOFOL 200 MG: 10 INJECTION, EMULSION INTRAVENOUS at 18:16

## 2021-02-26 RX ADMIN — Medication 140 MG: at 18:16

## 2021-02-26 RX ADMIN — PROPOFOL 90 MG: 10 INJECTION, EMULSION INTRAVENOUS at 09:50

## 2021-02-26 RX ADMIN — SUGAMMADEX 200 MG: 100 INJECTION, SOLUTION INTRAVENOUS at 18:32

## 2021-02-26 RX ADMIN — DOCUSATE SODIUM 50 MG AND SENNOSIDES 8.6 MG 1 TABLET: 8.6; 5 TABLET, FILM COATED ORAL at 22:04

## 2021-02-26 RX ADMIN — ACETAMINOPHEN 650 MG: 325 TABLET ORAL at 22:04

## 2021-02-26 RX ADMIN — ROCURONIUM BROMIDE 50 MG: 10 INJECTION INTRAVENOUS at 18:21

## 2021-02-26 RX ADMIN — KETOROLAC TROMETHAMINE 30 MG: 30 INJECTION, SOLUTION INTRAMUSCULAR; INTRAVENOUS at 11:38

## 2021-02-26 RX ADMIN — MORPHINE SULFATE 4 MG: 4 INJECTION, SOLUTION INTRAMUSCULAR; INTRAVENOUS at 08:50

## 2021-02-26 RX ADMIN — SODIUM CHLORIDE 1000 ML: 9 INJECTION, SOLUTION INTRAVENOUS at 08:16

## 2021-02-26 ASSESSMENT — PULMONARY FUNCTION TESTS
PIF_VALUE: 28
PIF_VALUE: 25
PIF_VALUE: 29
PIF_VALUE: 32
PIF_VALUE: 27
PIF_VALUE: 22
PIF_VALUE: 4
PIF_VALUE: 32
PIF_VALUE: 21
PIF_VALUE: 1
PIF_VALUE: 21
PIF_VALUE: 4
PIF_VALUE: 2
PIF_VALUE: 31
PIF_VALUE: 1
PIF_VALUE: 9
PIF_VALUE: 29
PIF_VALUE: 0

## 2021-02-26 ASSESSMENT — PAIN SCALES - GENERAL
PAINLEVEL_OUTOF10: 0
PAINLEVEL_OUTOF10: 2
PAINLEVEL_OUTOF10: 0
PAINLEVEL_OUTOF10: 5
PAINLEVEL_OUTOF10: 0
PAINLEVEL_OUTOF10: 8
PAINLEVEL_OUTOF10: 0

## 2021-02-26 ASSESSMENT — LIFESTYLE VARIABLES: SMOKING_STATUS: 0

## 2021-02-26 ASSESSMENT — PAIN DESCRIPTION - LOCATION: LOCATION: HIP

## 2021-02-26 NOTE — ED NOTES
Nursing report called to Dina Yeh at [facility].      Para MONICA Kam  02/26/21 9936

## 2021-02-26 NOTE — ED NOTES
1526 - called to check on unit going to [facility], first care advised that unit is pulling in.     Kyree Randolph  02/26/21 1532    1534 - [facility] here for transport.       Kyree Randolph  02/26/21 1534

## 2021-02-26 NOTE — PROGRESS NOTES
Pt admitted to room 5524. BP elevated. Pt c/o pain to right hip. Pt headed directly to surgery, RN unable to complete admission. Consent signed. Pt transported to PACU for procedure.

## 2021-02-26 NOTE — ED TRIAGE NOTES
Chief Complaint   Patient presents with    Hip Pain     right hip replacement in aug.  pt was in the shower this morning, bent over to dry off and felt a pop in her hip.  did not fall.

## 2021-02-26 NOTE — ED NOTES
Call placed to [facility] @ [phone], spoke with Julio [facility]  02/26/21 5430

## 2021-02-26 NOTE — PROGRESS NOTES
Patient to PACU 9 s/p RIGHT HIP CLOSED REDUCTION - Right, report received from CRNA, reported hemodynamically stable intra op, hypertensive as she was pre op.  Patient alert and denies pain

## 2021-02-26 NOTE — PROGRESS NOTES
Patient arrived to PACU #16 in bed at 1745. Vital signs taken. Dr. Kwasi Brooke at bedside in PACU to talk with and assess patient. Dr. Marlene Bowen at bedside in PACU at [address] talking with patient.

## 2021-02-26 NOTE — ED PROVIDER NOTES
CHIEF COMPLAINT  Hip Pain (right hip replacement in aug.  pt was in the shower this morning, bent over to dry off and felt a pop in her hip.  did not fall.)      HISTORY OF PRESENT ILLNESS  Lazaro Curtis is a 45 y.o. female with a history of hypertension, dyslipidemia, hypothyroidism, osteoarthritis, status post right hip replacement 8/10/2020 who presents to the ED complaining of right hip dislocation. Patient was in the shower and bent over to dry herself off when she felt her right hip pop. She is certain it is dislocated. Came immediately to the emergency department with EMS. Denies prior history of dislocation. Pain is moderate to severe, worse with movement. No other complaints, modifying factors or associated symptoms. I have reviewed the following from the nursing documentation. Past Medical History:   Diagnosis Date    Arthritis     Hyperlipidemia     Hypertension     Thyroid disease     was hyperthyroid     Past Surgical History:   Procedure Laterality Date    COLONOSCOPY      FOOT SURGERY Bilateral     3 on each foot    OTHER SURGICAL HISTORY      Spinal stimulator left hip    TOTAL HIP ARTHROPLASTY Right 8/10/2020    RIGHT TOTAL HIP ARTHROPLASTY ANTERIOR APPROACH performed by Naya Rosen MD at [facility] [address] Bilateral      History reviewed. No pertinent family history.   Social History     Socioeconomic History    Marital status:      Spouse name: Not on file    Number of children: Not on file    Years of education: Not on file    Highest education level: Not on file   Occupational History    Not on file   Social Needs    Financial resource strain: Not on file    Food insecurity     Worry: Not on file     Inability: Not on file    Transportation needs     Medical: Not on file     Non-medical: Not on file   Tobacco Use    Smoking status: Former Smoker     Quit date: 10/17/2018     Years since quittin.3  omeprazole (PRILOSEC) 20 MG delayed release capsule Take 20 mg by mouth daily      cetirizine (ZYRTEC) 10 MG tablet Take 10 mg by mouth daily      cycloSPORINE (RESTASIS) 0.05 % ophthalmic emulsion 1 drop 2 times daily      lisinopril-hydrochlorothiazide (PRINZIDE;ZESTORETIC) 20-25 MG per tablet Take 1 tablet by mouth daily.  acyclovir (ZOVIRAX) 200 MG capsule Take 800 mg by mouth daily.  DULoxetine (CYMBALTA) 60 MG capsule Take 60 mg by mouth daily.  GLUCOSAMINE HCL-MSM PO Take  by mouth.  Polyethylene Glycol 3350 (MIRALAX PO) Take  by mouth. Allergies   Allergen Reactions    Adhesive Tape      bandaids  bandaids         REVIEW OF SYSTEMS  10 systems reviewed, pertinent positives per HPI otherwise noted to be negative. PHYSICAL EXAM  BP (!) 181/98   Pulse 76   Temp 98 °F (36.7 °C) (Oral)   Resp 11   Ht 5' 4\" (1.626 m)   Wt 230 lb (104.3 kg)   SpO2 97%   BMI 39.48 kg/m²   GENERAL APPEARANCE: Awake and alert. Cooperative. Appears uncomfortable but nontoxic. HEAD: Normocephalic. Atraumatic. EYES: PERRL. EOM's grossly intact. ENT: Mucous membranes are moist.   NECK: Supple, trachea midline. HEART: RRR. Normal S1S2, no rubs, gallops, or murmurs noted  LUNGS: Respirations unlabored. CTAB. Good air exchange. No wheezes, rales, or rhonchi. Speaking comfortably in full sentences. ABDOMEN: Soft. Non-distended. Non-tender. No guarding or rebound. Normal bowel sounds. EXTREMITIES: No peripheral edema. MAEE. Right lower extremity shortened and slightly externally rotated. Right hip tenderness to palpation and passive range of motion. SKIN: Warm and dry. No acute rashes. NEUROLOGICAL: Alert and oriented X 3. CN II-XII intact. No gross facial drooping. Strength 5/5, sensation intact. Normal coordination. PSYCHIATRIC: Normal mood and affect. LABS  I have reviewed all labs for this visit. Results for orders placed or performed during the hospital encounter of 02/26/21   CBC Auto Differential   Result Value Ref Range    WBC 6.2 4.0 - 11.0 K/uL    RBC 4.75 4.00 - 5.20 M/uL    Hemoglobin 15.0 12.0 - 16.0 g/dL    Hematocrit 44.9 36.0 - 48.0 %    MCV 94.6 80.0 - 100.0 fL    MCH 31.5 26.0 - 34.0 pg    MCHC 33.4 31.0 - 36.0 g/dL    RDW 15.7 (H) 12.4 - 15.4 %    Platelets 419 456 - 794 K/uL    MPV 7.7 5.0 - 10.5 fL    Neutrophils % 66.9 %    Lymphocytes % 23.0 %    Monocytes % 7.2 %    Eosinophils % 1.9 %    Basophils % 1.0 %    Neutrophils Absolute 4.2 1.7 - 7.7 K/uL    Lymphocytes Absolute 1.4 1.0 - 5.1 K/uL    Monocytes Absolute 0.4 0.0 - 1.3 K/uL    Eosinophils Absolute 0.1 0.0 - 0.6 K/uL    Basophils Absolute 0.1 0.0 - 0.2 K/uL   Basic Metabolic Panel w/ Reflex to MG   Result Value Ref Range    Sodium 138 136 - 145 mmol/L    Potassium reflex Magnesium 4.6 3.5 - 5.1 mmol/L    Chloride 103 99 - 110 mmol/L    CO2 27 21 - 32 mmol/L    Anion Gap 8 3 - 16    Glucose 146 (H) 70 - 99 mg/dL    BUN 17 7 - 20 mg/dL    CREATININE 0.7 0.6 - 1.1 mg/dL    GFR Non-African American >60 >60    GFR African American >60 >60    Calcium 9.9 8.3 - 10.6 mg/dL       EKG  Normal sinus rhythm, rate 63, normal axis, QTC within normal limits, no acute ST changes or T wave inversions      RADIOLOGY  X-RAYS:  I have reviewed radiologic plain film image(s). ALL OTHER NON-PLAIN FILM IMAGES SUCH AS CT, ULTRASOUND AND MRI HAVE BEEN READ BY THE RADIOLOGIST. XR CHEST PORTABLE   Final Result   No active cardiopulmonary disease         XR HIP RIGHT (2-3 VIEWS)   Final Result   Superior dislocation of the femoral component of a right hip prosthesis                    Rechecks: Physical assessment performed.   Appears comfortable and nontoxic on reevaluation    PROCEDURES:Procedural sedation    Date/Time: 2/26/2021 9:50 AM  Performed by: Celeste Gray MD  Authorized by: Celeste Gray MD     Consent:     Consent obtained:  Written Consent given by:  Patient    Risks discussed:   Allergic reaction, inadequate sedation, vomiting, prolonged hypoxia resulting in organ damage and respiratory compromise necessitating ventilatory assistance and intubation  Indications:     Procedure performed:  Dislocation reduction    Procedure necessitating sedation performed by:  Physician performing sedation    Intended level of sedation:  Moderate (conscious sedation)  Pre-sedation assessment:     Time since last food or drink:  12hrs    ASA classification: class 2 - patient with mild systemic disease      Neck mobility: normal      Mouth opening:  3 or more finger widths    Mallampati score:  II - soft palate, uvula, fauces visible    Pre-sedation assessments completed and reviewed: airway patency, anesthesia/sedation history, cardiovascular function, hydration status, mental status, nausea/vomiting, pain level and respiratory function    Immediate pre-procedure details:     Reassessment: Patient reassessed immediately prior to procedure      Reviewed: vital signs, relevant labs/tests and NPO status      Verified: bag valve mask available, emergency equipment available, intubation equipment available, IV patency confirmed, oxygen available and suction available    Procedure details (see MAR for exact dosages):     Sedation start time:  2/26/2021 9:50 AM    Preoxygenation:  Nasal cannula    Sedation:  Propofol    Intra-procedure monitoring:  Blood pressure monitoring, cardiac monitor, continuous capnometry, continuous pulse oximetry, frequent LOC assessments and frequent vital sign checks    Intra-procedure events: none      Sedation end time:  2/26/2021 9:55 AM    Total sedation time (minutes):  5  Post-procedure details:     Attendance: Constant attendance by certified staff until patient recovered      Recovery: Patient returned to pre-procedure baseline      Estimated blood loss (see I/O flowsheets): no Patient is stable for discharge or admission: yes      Patient tolerance: Tolerated well, no immediate complications  Ortho Injury    Date/Time: 2/26/2021 9:50 AM  Performed by: Med Ashraf MD  Authorized by: Med Ashraf MD   Consent: Written consent obtained. Risks and benefits: risks, benefits and alternatives were discussed  Consent given by: patient  Patient understanding: patient states understanding of the procedure being performed  Patient consent: the patient's understanding of the procedure matches consent given  Procedure consent: procedure consent matches procedure scheduled  Relevant documents: relevant documents present and verified  Test results: test results available and properly labeled  Imaging studies: imaging studies available  Patient identity confirmed: verbally with patient  Time out: Immediately prior to procedure a \"time out\" was called to verify the correct patient, procedure, equipment, support staff and site/side marked as required. Injury location: hip  Location details: right hip  Injury type: dislocation  Spontaneous dislocation: yes  Prosthesis: yes  Pre-procedure neurovascular assessment: neurovascularly intact  Pre-procedure distal perfusion: normal  Pre-procedure neurological function: normal  Pre-procedure range of motion: normal    Anesthesia:  Local anesthesia used: no    Sedation:  Patient sedated: yes  Sedation type: moderate (conscious) sedation  Sedatives: propofol  Analgesia: morphine  Vitals: Vital signs were monitored during sedation.     Manipulation performed: yes  Reduction successful: no  Post-procedure neurovascular assessment: post-procedure neurovascularly intact  Post-procedure distal perfusion: normal  Post-procedure neurological function: normal  Patient tolerance: patient tolerated the procedure well with no immediate complications            ED COURSE/MDM Patient seen and evaluated. Old records reviewed. Labs and imaging reviewed and results discussed with patient. Unsuccessful conscious sedation and attempted closed reduction of right hip dislocation. Patient's orthopedic surgeon has agreed to accept her for transfer to [facility]. Plan to take her to the OR for another reduction attempt and if there are any complications possible open revision. New Prescriptions    No medications on file       CLINICAL IMPRESSION  1. Closed dislocation of right hip, initial encounter ([facility] [address]        Blood pressure (!) 181/98, pulse 76, temperature 98 °F (36.7 °C), temperature source Oral, resp. rate 11, height 5' 4\" (1.626 m), weight 230 lb (104.3 kg), SpO2 97 %. DISPOSITION  Yuri Summers was transferred in stable condition.         Jules Lopez MD  02/26/21 0412

## 2021-02-26 NOTE — ANESTHESIA PRE PROCEDURE
GLUCOSAMINE HCL-MSM PO Take  by mouth. Historical Provider, MD   Polyethylene Glycol 3350 (MIRALAX PO) Take  by mouth. Historical Provider, MD       Current medications:    Current Outpatient Medications   Medication Sig Dispense Refill    metroNIDAZOLE (FLAGYL) 500 MG tablet Take 500 mg by mouth 2 times daily      metoprolol succinate (TOPROL XL) 25 MG extended release tablet Take 25 mg by mouth nightly      aspirin 81 MG chewable tablet Take 81 mg by mouth daily      bisacodyl (DULCOLAX) 5 MG EC tablet Take 1 tablet by mouth 2 times daily as needed for Constipation 60 tablet 1    tiZANidine (ZANAFLEX) 4 MG tablet Take 1 tablet by mouth every 8 hours as needed (muscle spasm) 60 tablet 1    vitamin B-12 (CYANOCOBALAMIN) 1000 MCG tablet Take 1,000 mcg by mouth daily      gabapentin (NEURONTIN) 400 MG capsule Take 400 mg by mouth 3 times daily.  meloxicam (MOBIC) 15 MG tablet Take 15 mg by mouth daily      omeprazole (PRILOSEC) 20 MG delayed release capsule Take 20 mg by mouth daily      cetirizine (ZYRTEC) 10 MG tablet Take 10 mg by mouth daily      cycloSPORINE (RESTASIS) 0.05 % ophthalmic emulsion 1 drop 2 times daily      lisinopril-hydrochlorothiazide (PRINZIDE;ZESTORETIC) 20-25 MG per tablet Take 1 tablet by mouth daily.  acyclovir (ZOVIRAX) 200 MG capsule Take 800 mg by mouth daily.  DULoxetine (CYMBALTA) 60 MG capsule Take 60 mg by mouth daily.  GLUCOSAMINE HCL-MSM PO Take  by mouth.  Polyethylene Glycol 3350 (MIRALAX PO) Take  by mouth. No current facility-administered medications for this visit. Facility-Administered Medications Ordered in Other Visits   Medication Dose Route Frequency Provider Last Rate Last Admin    0.9 % sodium chloride infusion  1,000 mL Intravenous Continuous Se Castellanos MD   Stopped at 02/26/21 1140       Allergies:     Allergies   Allergen Reactions    Adhesive Tape      bandaids  bandaids         Problem List: Patient Active Problem List   Diagnosis Code    Primary osteoarthritis of right hip M16.11    Status post total hip replacement, right Z96.641    Hypertension I10    Hyperlipidemia E78.5    Thyroid disease E07.9    Right hip pain M25.551    Hip dislocation, right, sequela S73.004S       Past Medical History:        Diagnosis Date    Arthritis     Hyperlipidemia     Hypertension     Thyroid disease     was hyperthyroid       Past Surgical History:        Procedure Laterality Date    COLONOSCOPY      FOOT SURGERY Bilateral     3 on each foot    OTHER SURGICAL HISTORY      Spinal stimulator left hip    TOTAL HIP ARTHROPLASTY Right 8/10/2020    RIGHT TOTAL HIP ARTHROPLASTY ANTERIOR APPROACH performed by Juan Mccullough MD at [address] TYMPANOSTOMY TUBE PLACEMENT Bilateral        Social History:    Social History     Tobacco Use    Smoking status: Former Smoker     Quit date: 10/17/2018     Years since quittin.3    Smokeless tobacco: Never Used   Substance Use Topics    Alcohol use: Not on file     Comment: occassional                                Counseling given: Not Answered      Vital Signs (Current): There were no vitals filed for this visit.                                            BP Readings from Last 3 Encounters:   21 (!) 175/99   21 (!) 148/84   10/01/20 138/86       NPO Status:                                                                                 BMI:   Wt Readings from Last 3 Encounters:   21 230 lb (104.3 kg)   21 230 lb (104.3 kg)   10/01/20 230 lb (104.3 kg)     There is no height or weight on file to calculate BMI.    CBC:   Lab Results   Component Value Date    WBC 6.2 2021    RBC 4.75 2021    HGB 15.0 2021    HCT 44.9 2021    MCV 94.6 2021    RDW 15.7 2021     2021       CMP:   Lab Results   Component Value Date     2021    K 4.6 2021     2021 CO2 27 02/26/2021    BUN 17 02/26/2021    CREATININE 0.7 02/26/2021    GFRAA >60 02/26/2021    LABGLOM >60 02/26/2021    GLUCOSE 146 02/26/2021    CALCIUM 9.9 02/26/2021       POC Tests: No results for input(s): POCGLU, POCNA, POCK, POCCL, POCBUN, POCHEMO, POCHCT in the last 72 hours. Coags:   Lab Results   Component Value Date    APTT 34.4 08/10/2020       HCG (If Applicable):   Lab Results   Component Value Date    PREGTESTUR Negative 08/10/2020        ABGs: No results found for: PHART, PO2ART, CUZ6UTS, YLO9QQG, BEART, J3HFHMBW     Type & Screen (If Applicable):  No results found for: LABABO, LABRH    Drug/Infectious Status (If Applicable):  No results found for: HIV, HEPCAB    COVID-19 Screening (If Applicable):   Lab Results   Component Value Date    COVID19 NOT DETECTED 08/05/2020         Anesthesia Evaluation  Patient summary reviewed and Nursing notes reviewed  Airway: Mallampati: II  TM distance: >3 FB   Neck ROM: full  Mouth opening: > = 3 FB Dental:          Pulmonary:Negative Pulmonary ROS and normal exam  breath sounds clear to auscultation      (-) COPD and not a current smoker          Patient did not smoke on day of surgery. Cardiovascular:  Exercise tolerance: good (>4 METS),   (+) hypertension: mild,     (-)  angina and  ENGEL      Rhythm: regular  Rate: normal           Beta Blocker:  Not on Beta Blocker         Neuro/Psych:   Negative Neuro/Psych ROS     (-) seizures           GI/Hepatic/Renal:   (+) GERD: well controlled,      (-) liver disease       Endo/Other: Negative Endo/Other ROS       (-) diabetes mellitus               Abdominal:       Abdomen: soft. Vascular: negative vascular ROS. Anesthesia Plan      general     ASA 3 - emergent     (-npo mn  -daily marajuana  -recent hip surgery in 8/2020)  Induction: intravenous. MIPS: Postoperative opioids intended and Prophylactic antiemetics administered. Anesthetic plan and risks discussed with patient. Use of blood products discussed with patient whom consented to blood products. Plan discussed with attending and CRNA.     Attending anesthesiologist reviewed and agrees with Pre Eval content            Nicci Orta MD   2021

## 2021-02-26 NOTE — ED NOTES
Perfect serve message to Ortho @ [address] is on call at this time.  Notified surgery was done by Dr. Glenn Stone  02/26/21 [address] returned the call to Dr. Popeye Toribio @ [address] [place] [address]  02/26/21 3239

## 2021-02-26 NOTE — PLAN OF CARE
Pt presented to the emergency room this morning after an incident at home in which she bent over and felt her hip pop out of place. She had a recent Total Hip replacement with Dr Ja Tellez at [facility] in 8/2020 and has apparently done fine since that surgery. ER attempted to reduce the hip today without success. Reviewed films and history with Dr Marilyn Darling today who is covering call for [facility]. He has called Dr Ja Tellez as well to further discuss the case. At this time Dr Lyndsay Ordaz does not have privileges at [facility].  There is some concern that the acetabular cup is loose on the plain films and may be preventing adequate reduction of the hip. At this time we have asked the ER to arrange for transfer of the patient to [facility] to undergo further evaluation with Dr Ja Tellez and proceed to the [address] [place] [address] for attempt at closed reduction but possible need for open reduction and possible revision of the hip replacement. At this time we will keep her NPO in anticipation for surgery tonight. I will also obtain pre op labs to include EKG and CXR in preparation for surgery. Will get inflammatory markers as well to check for any signs of infection that could cause loosening of the implant. Discussed plan at length with Dr Ja Tellez and with the ER physician. The transfer center will be notified of the need to have the patient sent to [facility] and be directly admitted to Dr Ja Tellez.       Brock Daly PA-C

## 2021-02-27 LAB
ANION GAP SERPL CALCULATED.3IONS-SCNC: 8 MMOL/L (ref 3–16)
BUN BLDV-MCNC: 13 MG/DL (ref 7–20)
CALCIUM SERPL-MCNC: 9.4 MG/DL (ref 8.3–10.6)
CHLORIDE BLD-SCNC: 104 MMOL/L (ref 99–110)
CO2: 26 MMOL/L (ref 21–32)
CREAT SERPL-MCNC: 0.7 MG/DL (ref 0.6–1.1)
GFR AFRICAN AMERICAN: >60
GFR NON-AFRICAN AMERICAN: >60
GLUCOSE BLD-MCNC: 114 MG/DL (ref 70–99)
HCT VFR BLD CALC: 45 % (ref 36–48)
HEMOGLOBIN: 14.5 G/DL (ref 12–16)
MCH RBC QN AUTO: 31 PG (ref 26–34)
MCHC RBC AUTO-ENTMCNC: 32.1 G/DL (ref 31–36)
MCV RBC AUTO: 96.6 FL (ref 80–100)
PDW BLD-RTO: 15.9 % (ref 12.4–15.4)
PLATELET # BLD: 294 K/UL (ref 135–450)
PMV BLD AUTO: 7.9 FL (ref 5–10.5)
POTASSIUM SERPL-SCNC: 4.1 MMOL/L (ref 3.5–5.1)
PREALBUMIN: 23.6 MG/DL (ref 20–40)
RBC # BLD: 4.66 M/UL (ref 4–5.2)
SODIUM BLD-SCNC: 138 MMOL/L (ref 136–145)
WBC # BLD: 6.7 K/UL (ref 4–11)

## 2021-02-27 PROCEDURE — 84134 ASSAY OF PREALBUMIN: CPT

## 2021-02-27 PROCEDURE — 36415 COLL VENOUS BLD VENIPUNCTURE: CPT

## 2021-02-27 PROCEDURE — G0378 HOSPITAL OBSERVATION PER HR: HCPCS

## 2021-02-27 PROCEDURE — 6370000000 HC RX 637 (ALT 250 FOR IP): Performed by: PHYSICIAN ASSISTANT

## 2021-02-27 PROCEDURE — 85027 COMPLETE CBC AUTOMATED: CPT

## 2021-02-27 PROCEDURE — 6370000000 HC RX 637 (ALT 250 FOR IP): Performed by: INTERNAL MEDICINE

## 2021-02-27 PROCEDURE — 80048 BASIC METABOLIC PNL TOTAL CA: CPT

## 2021-02-27 PROCEDURE — 99024 POSTOP FOLLOW-UP VISIT: CPT | Performed by: ORTHOPAEDIC SURGERY

## 2021-02-27 PROCEDURE — 83036 HEMOGLOBIN GLYCOSYLATED A1C: CPT

## 2021-02-27 PROCEDURE — 1200000000 HC SEMI PRIVATE

## 2021-02-27 PROCEDURE — 2580000003 HC RX 258: Performed by: PHYSICIAN ASSISTANT

## 2021-02-27 RX ADMIN — METOPROLOL SUCCINATE 25 MG: 50 TABLET, EXTENDED RELEASE ORAL at 20:16

## 2021-02-27 RX ADMIN — CETIRIZINE HYDROCHLORIDE 10 MG: 10 TABLET, FILM COATED ORAL at 08:13

## 2021-02-27 RX ADMIN — LISINOPRIL AND HYDROCHLOROTHIAZIDE 1 TABLET: 25; 20 TABLET ORAL at 08:10

## 2021-02-27 RX ADMIN — Medication 10 ML: at 20:19

## 2021-02-27 RX ADMIN — ACETAMINOPHEN 650 MG: 325 TABLET ORAL at 14:37

## 2021-02-27 RX ADMIN — CYANOCOBALAMIN TAB 1000 MCG 1000 MCG: 1000 TAB at 08:12

## 2021-02-27 RX ADMIN — DOCUSATE SODIUM 50 MG AND SENNOSIDES 8.6 MG 1 TABLET: 8.6; 5 TABLET, FILM COATED ORAL at 08:11

## 2021-02-27 RX ADMIN — ACETAMINOPHEN 650 MG: 325 TABLET ORAL at 20:17

## 2021-02-27 RX ADMIN — PANTOPRAZOLE SODIUM 40 MG: 40 TABLET, DELAYED RELEASE ORAL at 08:12

## 2021-02-27 RX ADMIN — Medication 10 ML: at 08:10

## 2021-02-27 RX ADMIN — Medication 5 MG: at 20:16

## 2021-02-27 RX ADMIN — ACETAMINOPHEN 650 MG: 325 TABLET ORAL at 08:13

## 2021-02-27 RX ADMIN — DULOXETINE HYDROCHLORIDE 60 MG: 60 CAPSULE, DELAYED RELEASE ORAL at 08:12

## 2021-02-27 RX ADMIN — DOCUSATE SODIUM 50 MG AND SENNOSIDES 8.6 MG 1 TABLET: 8.6; 5 TABLET, FILM COATED ORAL at 20:17

## 2021-02-27 RX ADMIN — GABAPENTIN 400 MG: 400 CAPSULE ORAL at 14:37

## 2021-02-27 RX ADMIN — ACYCLOVIR 800 MG: 200 CAPSULE ORAL at 08:11

## 2021-02-27 RX ADMIN — METRONIDAZOLE 500 MG: 500 TABLET ORAL at 20:16

## 2021-02-27 RX ADMIN — METRONIDAZOLE 500 MG: 500 TABLET ORAL at 08:11

## 2021-02-27 RX ADMIN — GABAPENTIN 400 MG: 400 CAPSULE ORAL at 20:17

## 2021-02-27 ASSESSMENT — PAIN DESCRIPTION - ONSET
ONSET: ON-GOING

## 2021-02-27 ASSESSMENT — PAIN DESCRIPTION - PAIN TYPE
TYPE: ACUTE PAIN;SURGICAL PAIN
TYPE: ACUTE PAIN
TYPE: ACUTE PAIN

## 2021-02-27 ASSESSMENT — PAIN SCALES - GENERAL
PAINLEVEL_OUTOF10: 2
PAINLEVEL_OUTOF10: 1
PAINLEVEL_OUTOF10: 2
PAINLEVEL_OUTOF10: 2
PAINLEVEL_OUTOF10: 1

## 2021-02-27 ASSESSMENT — PAIN DESCRIPTION - DESCRIPTORS
DESCRIPTORS: SORE
DESCRIPTORS: ACHING
DESCRIPTORS: HEADACHE

## 2021-02-27 ASSESSMENT — PAIN DESCRIPTION - FREQUENCY
FREQUENCY: CONTINUOUS

## 2021-02-27 ASSESSMENT — PAIN DESCRIPTION - PROGRESSION
CLINICAL_PROGRESSION: NOT CHANGED
CLINICAL_PROGRESSION: GRADUALLY WORSENING
CLINICAL_PROGRESSION: NOT CHANGED

## 2021-02-27 ASSESSMENT — PAIN - FUNCTIONAL ASSESSMENT
PAIN_FUNCTIONAL_ASSESSMENT: ACTIVITIES ARE NOT PREVENTED

## 2021-02-27 ASSESSMENT — PAIN DESCRIPTION - ORIENTATION
ORIENTATION: RIGHT
ORIENTATION: RIGHT

## 2021-02-27 ASSESSMENT — PAIN DESCRIPTION - LOCATION
LOCATION: HIP
LOCATION: HIP
LOCATION: HEAD

## 2021-02-27 NOTE — OP NOTE
Operative Note      Patient: Caro Velazquez  YOB: 1970  MRN: [id number]    Date of Procedure: 2/26/2021    Pre-Op Diagnosis: RIGHT HIP DISLOCATION    Post-Op Diagnosis: Same       Procedure(s):  RIGHT HIP CLOSED REDUCTION    Surgeon(s):  Santana Santos MD    Assistant:   Surgical Assistant: Javier Loredo  Physician Assistant: ERICA Thomas    Anesthesia: General    Estimated Blood Loss (mL): Minimal    Complications: None    Specimens:   * No specimens in log *    Implants:  * No implants in log *      Drains:   Negative Pressure Wound Therapy Leg Anterior;Proximal;Right;Upper (Active)       Findings: easy dislocation reduction     Detailed Description of Procedure:   Patient: Caro Velazquez MRN: [id number]     YOB: 1970  Age: 45 y.o. Sex: female      Date of Operation: [unfilled]       Preoperative diagnosis right hip dislocation  Postoperative diagnosis same  Surgeon Imelda Agosto MD  Procedure right hip dislocation      Anesthesia General     Indications : patient had dislocation of total hip arthroplasty. Surgical risks including medical and anaesthesia complications were discussed. Complications including joint stiffness, wound complications, infection, necessity for additional surgery or ultimately revision were discussed. Procedure patient was brought to the operating room in stable condition underwent induction of general propofol sedation. Manipulation of the hip was done under anesthesia in satisfactory relocation of the hip was performed. Upon relocation it was assessed that dislocation again was stable. Patient was placed into an abduction pillow and left the operating room in stable condition.   As transferred to the floor neurovascularly intact        Imelda Agosto MD      Electronically signed by Imelda Agosto MD on 2/27/2021 at 12:30 PM

## 2021-02-27 NOTE — PLAN OF CARE
Problem: Falls - Risk of:  Goal: Will remain free from falls  Description: Will remain free from falls  2/27/2021 3691 by Megha Alvarez RN  Outcome: Ongoing   All fall precautions in place. Chair wheels locked with alarm on. Over-bed table and personal belongings within reach. Patient instructed to use call light for assistance. Non-skids socks on. Will continue to monitor. Problem: Pain:  Goal: Control of acute pain  Description: Control of acute pain  2/27/2021 0054 by Shobha Quinn RN  Outcome: Ongoing  Note: Patient complains of 2/10 pain to R hip - describes as \"sore\". Patient medicated per [facility] with scheduled Tylenol. Patient resting comfortably in chair. Will continue to assess and monitor.

## 2021-02-27 NOTE — PROGRESS NOTES
Patient admitted to [address] [place] [address] room 3067 via bed. She is accompanied by her  sandhya is at the bedside currently. Patient is alert and oriented x4. She denies pain at this time. Vital signs are stable with exception to BP which is 182/100. Patient ambulated to the bathroom with no issues - she has already voided. Will retake BP at a later time since ambulation raises the BP. 4 eye assessment completed with secondary RN. Ice water provided to patient along with a diet drink. Encouraged to call out if she needs anything. Will continue to assess and monitor.

## 2021-02-27 NOTE — PROGRESS NOTES
PACU Transfer to Floor Note    Current Allergies: Adhesive tape    Pt meets criteria as per Je Score and ASPAN Standards to transfer to next phase of care. No results for input(s): POCGLU in the last 72 hours. Vitals:    02/26/21 1930   BP: (!) 154/100   Pulse: 76   Resp: 16   Temp: 97.6 °F (36.4 °C)   SpO2: 100%     Vitals within 20% of pt's admission vitals as per JE SCORE    SpO2: 100 %    O2 Flow Rate (L/min): 2 L/min      Intake/Output Summary (Last 24 hours) at 2/26/2021 1934  Last data filed at 2/26/2021 1841  Gross per 24 hour   Intake 300 ml   Output --   Net 300 ml       Pain assessment:  level of pain (1-10, 10 severe),     Pain Level: 0    70}    Handoff report given at bedside.    Family updated and directed to pt room      2/26/2021 7:34 PM

## 2021-02-27 NOTE — PLAN OF CARE
Problem: Falls - Risk of:  Goal: Will remain free from falls  Description: Will remain free from falls  Outcome: Ongoing  Note: Fall precautions in place. Bed is in lowest position, wheels locked and alarm on. Non-skid socks on. Call light and bedside table within reach. Pt calls out appropriately. Pt is NWB to the RLE. She is x 1 with a GB and walker to the bathroom. Will continue to assess and monitor. Problem: Pain:  Goal: Control of acute pain  Description: Control of acute pain  Outcome: Ongoing  Note: Patient uses 0-10 pain scale. Patient denies any pain at this time. She is encouraged to call out if any new pain arises. Will continue to assess and monitor.

## 2021-02-27 NOTE — PROGRESS NOTES
4 Eyes Admission Assessment     I agree as the admission nurse that 2 RN's have performed a thorough Head to Toe Skin Assessment on the patient. ALL assessment sites listed below have been assessed on admission. Areas assessed by both nurses:   [x]   Head, Face, and Ears   [x]   Shoulders, Back, and Chest  [x]   Arms, Elbows, and Hands   [x]   Coccyx, Sacrum, and Ischium  [x]   Legs, Feet, and Heels        Does the Patient have Skin Breakdown?   No         Lázaro Prevention initiated:  No   Wound Care Orders initiated:  No      [facility] nurse consulted for Pressure Injury (Stage 3,4, Unstageable, DTI, NWPT, and Complex wounds) or Lázaro score 18 or lower:  No      Nurse 1 eSignature: Electronically signed by Joo Teixeira RN on 2/26/21 at 8:24 PM EST    **SHARE this note so that the co-signing nurse is able to place an eSignature**    Nurse 2 eSignature: Electronically signed by Dante Shipman RN on 2/27/21 at 6:07 AM EST

## 2021-02-27 NOTE — PROGRESS NOTES
Patient is alert and oriented x 4. Calls out appropriately. She denies any pain in her RLE. Neuro checks remain WDL - patient denies numbness / tingling. She denies nausea / vomiting. She ambulated to the bathroom well - she has voided once. She is tolerating PO fluids adequately. Vital signs are stable. Will continue to assess and monitor.

## 2021-02-27 NOTE — PROGRESS NOTES
Hospitalist Progress Note      PCP: Diana Rojo    Date of Admission: 2/26/2021    Chief Complaint: Right hip pain    Hospital Course: Admitted for right hip pain secondary to dislocation of hip arthroplasty. Status post total hip arthroplasty relocation. Plan for revision surgery on Monday. We are consulted for medical management of comorbidities. Subjective: Patient states that she is feeling well. Just as left thigh soreness but otherwise doing well. Denies any other complaints. Medications:  Reviewed    Infusion Medications    sodium chloride       Scheduled Medications    acyclovir  800 mg Oral Daily    cetirizine  10 mg Oral Daily    DULoxetine  60 mg Oral Daily    gabapentin  400 mg Oral TID    lisinopril-hydroCHLOROthiazide  1 tablet Oral Daily    metoprolol succinate  25 mg Oral Nightly    metroNIDAZOLE  500 mg Oral BID    pantoprazole  40 mg Oral QAM AC    polyethylene glycol  17 g Oral Daily    vitamin B-12  1,000 mcg Oral Daily    sodium chloride flush  10 mL Intravenous 2 times per day    acetaminophen  650 mg Oral Q6H    sennosides-docusate sodium  1 tablet Oral BID    melatonin  5 mg Oral Nightly     PRN Meds: bisacodyl, polyvinyl alcohol, tiZANidine, sodium chloride flush, magnesium hydroxide, oxyCODONE **OR** oxyCODONE, HYDROmorphone **OR** HYDROmorphone, promethazine **OR** ondansetron, labetalol      Intake/Output Summary (Last 24 hours) at 2/27/2021 1251  Last data filed at 2/27/2021 0830  Gross per 24 hour   Intake 790 ml   Output 0 ml   Net 790 ml       Physical Exam Performed:    BP (!) 158/96   Pulse 71   Temp 98.2 °F (36.8 °C) (Oral)   Resp 18   SpO2 99%     General appearance: No apparent distress, appears stated age and cooperative. Obese  HEENT: Pupils equal, round, and reactive to light. Conjunctivae/corneas clear. Neck: Supple, with full range of motion. No jugular venous distention. Trachea midline.   Respiratory:  Normal respiratory effort. Clear to auscultation, bilaterally without Rales/Wheezes/Rhonchi. Cardiovascular: Regular rate and rhythm with normal S1/S2 without murmurs, rubs or gallops. Abdomen: Soft, non-tender, non-distended with normal bowel sounds. Musculoskeletal: No clubbing, cyanosis or edema bilaterally. Full range of motion without deformity. Skin: Skin color, texture, turgor normal.  No rashes or lesions. Neurologic:  Neurovascularly intact without any focal sensory/motor deficits. Cranial nerves: II-XII intact, grossly non-focal.  Psychiatric: Alert and oriented, thought content appropriate, normal insight  Capillary Refill: Brisk,< 3 seconds   Peripheral Pulses: +2 palpable, equal bilaterally       Labs:   Recent Labs     02/26/21  0811 02/27/21  0611   WBC 6.2 6.7   HGB 15.0 14.5   HCT 44.9 45.0    294     Recent Labs     02/26/21  0811 02/27/21  0611    138   K 4.6 4.1    104   CO2 27 26   BUN 17 13   CREATININE 0.7 0.7   CALCIUM 9.9 9.4     No results for input(s): AST, ALT, BILIDIR, BILITOT, ALKPHOS in the last 72 hours. No results for input(s): INR in the last 72 hours. No results for input(s): Leah Rogers in the last 72 hours. Urinalysis:    No results found for: Aletta Cammy, BACTERIA, RBCUA, BLOODU, SPECGRAV, [address]    Radiology:  XR HIP RIGHT (1 VIEW)   Final Result      1. Right hip arthroplasty without evidence of dalila-implant fracture or  dislocation. Assessment/Plan:    Acute hip dislocation status post procedure:   On as needed pain meds  Plan for surgery on Monday  RCRI score 0, 3.9% risk of major cardiac event  Low risk for surgery  Management per orthopedic     Hypertensive urgency:   Likely was secondary to not being on home medication and being in pain Monitor blood pressure, better today  Continue metoprolol and lisinopril/hydrochlorothiazide    Hyperglycemia:   Hemoglobin A1c pending  Monitor blood glucose  Continue carb controlled diet    Hyperlipidemia:   Continue statin     Hypothyroidism:   Continue Synthroid  Outpatient follow-up with TSH    Class II obesity:   Complicating assessment and treatment. Placing patient at risk for multiple co-morbidities as well as early death and contributing to the patient's presentation.  Education, and counseling  Recommended outpatient polysomnography and follow-up PCP for sleep apnea evaluation    DVT Prophylaxis: SCDs, per Ortho  Diet: DIET GENERAL;  Code Status: Full Code    PT/OT Eval Status: Per orthopedic surgery    Dispo -pending surgery on Monday, per orthopedic surgery    Sky Pride MD

## 2021-02-27 NOTE — CONSULTS
Hospital Medicine  Consult History & Physical        Chief Complaint: Right hip pain    Date of Service: Pt seen/examined in consultation on 2/26/2021    History Of Present Illness:      45 y.o. female who we are asked to see/evaluate by Megan Marvin MD for medical management of comorbidities. Patient initially presented with right hip pain, underwent right total hip arthroplasty anterior approach. Post surgery: I was asked to consult on the patient for medical management. Patient was noted to be hypertensive noted home medication was already restarted. Patient reported no current complaints, does have history of hypertension where she takes lisinopril hydrochlorothiazide and recently added metoprolol by PCP. Patient reported every time she goes to her PCP her BP is elevated. Patient does report some snoring but no apneic morning headaches in the past.  No prior polysomnography done previously. Patient reports compliant with medication, was able to void however has not passed any bowel movements. Patient also did report history of hypothyroidism however has since resolved. Patient was reported no prior history of diabetes although noted hyperglycemia in the past.    Past Medical History:        Diagnosis Date    Arthritis     Hyperlipidemia     Hypertension     Thyroid disease     was hyperthyroid       Past Surgical History:        Procedure Laterality Date    COLONOSCOPY      FOOT SURGERY Bilateral     3 on each foot    OTHER SURGICAL HISTORY      Spinal stimulator left hip    TOTAL HIP ARTHROPLASTY Right 8/10/2020    RIGHT TOTAL HIP ARTHROPLASTY ANTERIOR APPROACH performed by Megan Marvin MD at [facility] [address] Bilateral        Medications Prior to Admission:    Prior to Admission medications    Medication Sig Start Date End Date Taking?  Authorizing Provider   metroNIDAZOLE (FLAGYL) 500 MG tablet Take 500 mg by mouth 2 times daily 2/19/21  Yes Historical Provider, MD   metoprolol succinate (TOPROL XL) 25 MG extended release tablet Take 25 mg by mouth nightly 1/4/21  Yes Historical Provider, MD   aspirin 81 MG chewable tablet Take 81 mg by mouth daily   Yes Historical Provider, MD   tiZANidine (ZANAFLEX) 4 MG tablet Take 1 tablet by mouth every 8 hours as needed (muscle spasm) 8/11/20  Yes ERICA Rodríguez   vitamin B-12 (CYANOCOBALAMIN) 1000 MCG tablet Take 1,000 mcg by mouth daily   Yes Historical Provider, MD   gabapentin (NEURONTIN) 400 MG capsule Take 400 mg by mouth 3 times daily. Yes Historical Provider, MD   meloxicam (MOBIC) 15 MG tablet Take 15 mg by mouth daily   Yes Historical Provider, MD   omeprazole (PRILOSEC) 20 MG delayed release capsule Take 20 mg by mouth daily   Yes Historical Provider, MD   cetirizine (ZYRTEC) 10 MG tablet Take 10 mg by mouth daily   Yes Historical Provider, MD   cycloSPORINE (RESTASIS) 0.05 % ophthalmic emulsion 1 drop 2 times daily   Yes Historical Provider, MD   lisinopril-hydrochlorothiazide (PRINZIDE;ZESTORETIC) 20-25 MG per tablet Take 1 tablet by mouth daily. Yes Historical Provider, MD   acyclovir (ZOVIRAX) 200 MG capsule Take 800 mg by mouth daily. Yes Historical Provider, MD   DULoxetine (CYMBALTA) 60 MG capsule Take 60 mg by mouth daily. Yes Historical Provider, MD   GLUCOSAMINE HCL-MSM PO Take  by mouth. Yes Historical Provider, MD   bisacodyl (DULCOLAX) 5 MG EC tablet Take 1 tablet by mouth 2 times daily as needed for Constipation 8/11/20   ERICA Tejada   Polyethylene Glycol 3350 (MIRALAX PO) Take  by mouth. Historical Provider, MD       Allergies:  Adhesive tape    Social History:          TOBACCO:   reports that she quit smoking about 2 years ago. She has never used smokeless tobacco.  ETOH:   has no history on file for alcohol. Family History:     Paternal aunt maternally history of high blood pressure  REVIEW OF SYSTEMS:   Pertinent positives as noted in the HPI.  All other systems reviewed and negative. PHYSICAL EXAM PERFORMED:  BP (!) 154/87 Comment: RN notified  Pulse 67   Temp 98.4 °F (36.9 °C) (Oral)   Resp 16   SpO2 96%   General appearance: No apparent distress, appears stated age and cooperative. HEENT: Normal cephalic, atraumatic without obvious deformity. Pupils equal, round, and reactive to light. Extra ocular muscles intact. Conjunctivae/corneas clear. Neck: Supple, with full range of motion. No jugular venous distention. Trachea midline. Respiratory:  Normal respiratory effort. Clear, bilaterally without wheezing  Cardiovascular: Regular rate and rhythm with normal S1/S2 without murmurs, rubs or gallops. Abdomen: Soft, non-tender, non-distended with normal bowel sounds. .. Neurologic:  Neurovascularly intact without any focal sensory/motor deficits. Cranial nerves: II-XII intact, grossly non-focal.  Psychiatric: Alert and oriented, thought content appropriate, normal insight  Capillary Refill: Brisk,< 3 seconds   Peripheral Pulses: +2 palpable, equal bilaterally     Labs:     Recent Labs     02/26/21  0811   WBC 6.2   HGB 15.0   HCT 44.9        Recent Labs     02/26/21  0811      K 4.6      CO2 27   BUN 17   CREATININE 0.7   CALCIUM 9.9     No results for input(s): AST, ALT, BILIDIR, BILITOT, ALKPHOS in the last 72 hours. No results for input(s): INR in the last 72 hours. No results for input(s): Curlie Lat in the last 72 hours. Urinalysis:  No results found for: Isadore Laos, BACTERIA, RBCUA, BLOODU, [address], [address]    Radiology: I have reviewed the radiology reports with the following interpretation:     XR HIP RIGHT (1 VIEW)   Final Result      1. Right hip arthroplasty without evidence of dalila-implant fracture or  dislocation.              EKG:  I have reviewed the EKG with the following interpretation:    @ekgread@      ASSESSMENT:    Active Hospital Problems    Diagnosis Date Noted    Hip dislocation, right, sequela [Z43.105O] 02/26/2021       PLAN:    1. Acute hip dislocation status post procedure:  Orthopedic management, much appreciated  Pain very well controlled    2. Hypertensive urgency: Home medication already restarted. Patient received her nighttime metoprolol. On recheck BP noted patient has been running in the 140s-150s/80s, asymptomatic. Will reassess after home medication given in the a.m. Recommended outpatient polysomnography and follow-up PCP    Chronic medical conditions:  Hyperlipidemia: Continue home medication  Hypothyroidism: Outpatient follow-up with TSH  Class II obesity: Complicating assessment and treatment. Placing patient at risk for multiple co-morbidities as well as early death and contributing to the patient's presentation.  Education, and counseling  Hyperglycemia: A1c pending    Diet: DIET GENERAL;  Code Status: Full Code    PT/OT Eval Status: Active and ongoing    Thank you for the consultation, will follow up as needed    Electronically signed by Tami Garcia DO on 2/27/21 at 12:47 AM EST

## 2021-02-27 NOTE — PROGRESS NOTES
Orthopedic Total Hip  Progress Note    Patient: Augustus Harris MRN: [id number]     YOB: 1970  Age: 45 y.o. Sex: female    Unit: [facility] Room/Bed: 4513/1988-66 Location: [address] [place] [address]     Admitting Physician: Wendy Osman   Primary Care Physician: Andrey Bran [facility]     Status Post right Total Hip Arthroplasty relocation. Post-Operative Day: 1     Subjective:      Systemic or Specific Complaints:No Complaints and Pain Control. No CP/SOB/Abdominal pain. Objective:   Vital signs in last 24 hours:  [unfilled]    General: NAD, alert and oriented x 3   Wound:  no significant erythema    Leg lengths:  Equal. No gross rotational abnormality. DVT Exam: No significant calf tenderness. NV Status:  Brisk capillary refill. Dorsalis pedis intact. Dorsiflexion/plantar flexion intact. Normal sensation to light touch. Data Review  CBC:   Lab Results   Component Value Date    WBC 6.7 02/27/2021    RBC 4.66 02/27/2021    HGB 14.5 02/27/2021    HCT 45.0 02/27/2021     02/27/2021       Assessment:      Active Problems:    Hip dislocation, right, sequela  Resolved Problems:    * No resolved hospital problems. *       Plan:   Stable status post total hip arthroplasty.    Plan for revision surgery on monday      Signed By: Apurva Mendoza

## 2021-02-28 LAB
ESTIMATED AVERAGE GLUCOSE: 99.7 MG/DL
HBA1C MFR BLD: 5.1 %

## 2021-02-28 PROCEDURE — G0378 HOSPITAL OBSERVATION PER HR: HCPCS

## 2021-02-28 PROCEDURE — 6370000000 HC RX 637 (ALT 250 FOR IP): Performed by: INTERNAL MEDICINE

## 2021-02-28 PROCEDURE — 2580000003 HC RX 258: Performed by: PHYSICIAN ASSISTANT

## 2021-02-28 PROCEDURE — 1200000000 HC SEMI PRIVATE

## 2021-02-28 PROCEDURE — 99024 POSTOP FOLLOW-UP VISIT: CPT | Performed by: PHYSICIAN ASSISTANT

## 2021-02-28 PROCEDURE — 6370000000 HC RX 637 (ALT 250 FOR IP): Performed by: PHYSICIAN ASSISTANT

## 2021-02-28 RX ADMIN — ACYCLOVIR 800 MG: 200 CAPSULE ORAL at 07:59

## 2021-02-28 RX ADMIN — METRONIDAZOLE 500 MG: 500 TABLET ORAL at 20:36

## 2021-02-28 RX ADMIN — LISINOPRIL AND HYDROCHLOROTHIAZIDE 1 TABLET: 25; 20 TABLET ORAL at 08:00

## 2021-02-28 RX ADMIN — GABAPENTIN 400 MG: 400 CAPSULE ORAL at 14:12

## 2021-02-28 RX ADMIN — ACETAMINOPHEN 650 MG: 325 TABLET ORAL at 07:58

## 2021-02-28 RX ADMIN — ACETAMINOPHEN 650 MG: 325 TABLET ORAL at 03:53

## 2021-02-28 RX ADMIN — Medication 5 MG: at 20:35

## 2021-02-28 RX ADMIN — DOCUSATE SODIUM 50 MG AND SENNOSIDES 8.6 MG 1 TABLET: 8.6; 5 TABLET, FILM COATED ORAL at 20:36

## 2021-02-28 RX ADMIN — METRONIDAZOLE 500 MG: 500 TABLET ORAL at 08:00

## 2021-02-28 RX ADMIN — CYANOCOBALAMIN TAB 1000 MCG 1000 MCG: 1000 TAB at 07:58

## 2021-02-28 RX ADMIN — GABAPENTIN 400 MG: 400 CAPSULE ORAL at 20:35

## 2021-02-28 RX ADMIN — PANTOPRAZOLE SODIUM 40 MG: 40 TABLET, DELAYED RELEASE ORAL at 06:29

## 2021-02-28 RX ADMIN — DOCUSATE SODIUM 50 MG AND SENNOSIDES 8.6 MG 1 TABLET: 8.6; 5 TABLET, FILM COATED ORAL at 07:59

## 2021-02-28 RX ADMIN — ACETAMINOPHEN 650 MG: 325 TABLET ORAL at 20:35

## 2021-02-28 RX ADMIN — Medication 10 ML: at 08:08

## 2021-02-28 RX ADMIN — ACETAMINOPHEN 650 MG: 325 TABLET ORAL at 14:12

## 2021-02-28 RX ADMIN — CETIRIZINE HYDROCHLORIDE 10 MG: 10 TABLET, FILM COATED ORAL at 07:59

## 2021-02-28 RX ADMIN — Medication 10 ML: at 20:39

## 2021-02-28 RX ADMIN — GABAPENTIN 400 MG: 400 CAPSULE ORAL at 07:59

## 2021-02-28 RX ADMIN — METOPROLOL SUCCINATE 25 MG: 50 TABLET, EXTENDED RELEASE ORAL at 20:35

## 2021-02-28 RX ADMIN — DULOXETINE HYDROCHLORIDE 60 MG: 60 CAPSULE, DELAYED RELEASE ORAL at 07:59

## 2021-02-28 ASSESSMENT — PAIN DESCRIPTION - FREQUENCY
FREQUENCY: CONTINUOUS

## 2021-02-28 ASSESSMENT — PAIN DESCRIPTION - PROGRESSION
CLINICAL_PROGRESSION: NOT CHANGED

## 2021-02-28 ASSESSMENT — PAIN - FUNCTIONAL ASSESSMENT
PAIN_FUNCTIONAL_ASSESSMENT: ACTIVITIES ARE NOT PREVENTED

## 2021-02-28 ASSESSMENT — PAIN DESCRIPTION - DESCRIPTORS
DESCRIPTORS: DISCOMFORT
DESCRIPTORS: HEADACHE

## 2021-02-28 ASSESSMENT — PAIN DESCRIPTION - ORIENTATION
ORIENTATION: RIGHT

## 2021-02-28 ASSESSMENT — PAIN DESCRIPTION - ONSET
ONSET: ON-GOING

## 2021-02-28 ASSESSMENT — PAIN DESCRIPTION - LOCATION
LOCATION: HIP

## 2021-02-28 ASSESSMENT — PAIN SCALES - GENERAL
PAINLEVEL_OUTOF10: 2
PAINLEVEL_OUTOF10: 0
PAINLEVEL_OUTOF10: 2
PAINLEVEL_OUTOF10: 2
PAINLEVEL_OUTOF10: 0
PAINLEVEL_OUTOF10: 3

## 2021-02-28 ASSESSMENT — PAIN DESCRIPTION - PAIN TYPE
TYPE: ACUTE PAIN

## 2021-02-28 NOTE — PROGRESS NOTES
Patient is AAOx4. VSS. Patient is up x1 with a walker. Patient taking tylenol for pain. Patient is voiding adequately. Patient is resting in bed at this time with all fall precautions in place. Will continue to monitor.

## 2021-02-28 NOTE — PROGRESS NOTES
Patient is alert and oriented x4. VSS on room air. Patient ambulating well with GB and walker. Patient has minor discomfort to the right hip being controlled with scheduled tylenol. Tolerating food and fluids and voiding adequately per BRP. Patient had a shower this shift and visited with her  per visitation policy. All fall precautions in place.

## 2021-02-28 NOTE — PROGRESS NOTES
Hospitalist Progress Note      PCP: Gloria Pascual    Date of Admission: 2/26/2021    Chief Complaint: Right hip pain    Hospital Course: Admitted for right hip pain secondary to dislocation of hip arthroplasty. Status post total hip arthroplasty relocation. Plan for revision surgery on Monday. We are consulted for medical management of comorbidities. Subjective: Patient states that she is feeling well. Tylenol is helping with the leg pain. Complaining of throat pain secondary to intubation for relocation. Denies any other complaints. Medications:  Reviewed    Infusion Medications    sodium chloride       Scheduled Medications    acyclovir  800 mg Oral Daily    cetirizine  10 mg Oral Daily    DULoxetine  60 mg Oral Daily    gabapentin  400 mg Oral TID    lisinopril-hydroCHLOROthiazide  1 tablet Oral Daily    metoprolol succinate  25 mg Oral Nightly    metroNIDAZOLE  500 mg Oral BID    pantoprazole  40 mg Oral QAM AC    polyethylene glycol  17 g Oral Daily    vitamin B-12  1,000 mcg Oral Daily    sodium chloride flush  10 mL Intravenous 2 times per day    acetaminophen  650 mg Oral Q6H    sennosides-docusate sodium  1 tablet Oral BID    melatonin  5 mg Oral Nightly     PRN Meds: bisacodyl, polyvinyl alcohol, tiZANidine, sodium chloride flush, magnesium hydroxide, oxyCODONE **OR** oxyCODONE, HYDROmorphone **OR** HYDROmorphone, promethazine **OR** ondansetron, labetalol      Intake/Output Summary (Last 24 hours) at 2/28/2021 0935  Last data filed at 2/27/2021 1305  Gross per 24 hour   Intake 240 ml   Output --   Net 240 ml       Physical Exam Performed:    /88   Pulse 54   Temp 97.6 °F (36.4 °C) (Oral)   Resp 16   SpO2 97%     General appearance: No apparent distress, appears stated age and cooperative. Obese  HEENT: Pupils equal, round, and reactive to light. Conjunctivae/corneas clear. Neck: Supple, with full range of motion. No jugular venous distention.  Trachea midline. Respiratory:  Normal respiratory effort. Clear to auscultation, bilaterally without Rales/Wheezes/Rhonchi. Cardiovascular: Regular rate and rhythm with normal S1/S2 without murmurs, rubs or gallops. Abdomen: Soft, non-tender, non-distended with normal bowel sounds. Musculoskeletal: No clubbing, cyanosis or edema bilaterally. Full range of motion without deformity. Skin: Skin color, texture, turgor normal.  No rashes or lesions. Neurologic:  Neurovascularly intact without any focal sensory/motor deficits. Cranial nerves: II-XII intact, grossly non-focal.  Psychiatric: Alert and oriented, thought content appropriate, normal insight  Capillary Refill: Brisk,< 3 seconds   Peripheral Pulses: +2 palpable, equal bilaterally       Labs:   Recent Labs     02/26/21  0811 02/27/21  0611   WBC 6.2 6.7   HGB 15.0 14.5   HCT 44.9 45.0    294     Recent Labs     02/26/21  0811 02/27/21  0611    138   K 4.6 4.1    104   CO2 27 26   BUN 17 13   CREATININE 0.7 0.7   CALCIUM 9.9 9.4     No results for input(s): AST, ALT, BILIDIR, BILITOT, ALKPHOS in the last 72 hours. No results for input(s): INR in the last 72 hours. No results for input(s): Robin Jakes in the last 72 hours. Urinalysis:    No results found for: Luvenia Nola, BACTERIA, RBCUA, BLOODU, SPECGRAV, [address]    Radiology:  XR HIP RIGHT (1 VIEW)   Final Result      1. Right hip arthroplasty without evidence of dalila-implant fracture or  dislocation. Assessment/Plan:    Acute hip dislocation status post procedure:   On as needed pain meds  Plan for surgery on Monday  RCRI score 0, 3.9% risk of major cardiac event  Low risk for surgery  Management per orthopedic    Throat pain:  Likely secondary to endotracheal tube  Phenol spray as needed noted     Hypertensive urgency/HTN:   Likely was secondary to not being on home medication and being in pain Monitor blood pressure, better today  Continue metoprolol and lisinopril/hydrochlorothiazide    Hyperglycemia:   Hemoglobin A1c 5.1  Monitor blood glucose  Continue carb controlled diet    Hyperlipidemia:   Continue statin     Hypothyroidism:   Continue Synthroid  Outpatient follow-up with TSH    Class II obesity:   Complicating assessment and treatment. Placing patient at risk for multiple co-morbidities as well as early death and contributing to the patient's presentation.  Education, and counseling  Recommended outpatient polysomnography and follow-up PCP for sleep apnea evaluation    DVT Prophylaxis: SCDs, per Ortho  Diet: DIET GENERAL;  Code Status: Full Code    PT/OT Eval Status: Per orthopedic surgery    Dispo -pending surgery on Monday, per orthopedic surgery    Hillary Mack MD

## 2021-02-28 NOTE — PROGRESS NOTES
Orthopedic Total Hip  Progress Note    Patient: Jaren Herrera MRN: [id number]     YOB: 1970  Age: 45 y.o. Sex: female    Unit: [facility] Room/Bed: 1850/9963-02 Location: [address] [place] [address]     Admitting Physician: Mckenna Ibarra   Primary Care Physician: To Shelton [facility]     Status Post right Total Hip Arthroplasty relocation. Post-Operative Day: 2     Subjective:      Systemic or Specific Complaints:No Complaints and Pain Control. No CP/SOB/Abdominal pain. Objective:   Vital signs in last 24 hours:  [unfilled]    General: NAD, alert and oriented x 3   Wound:  no significant erythema    Leg lengths:  Equal. No gross rotational abnormality. DVT Exam: No significant calf tenderness. NV Status:  Brisk capillary refill. Dorsalis pedis intact. Dorsiflexion/plantar flexion intact. Normal sensation to light touch. Data Review  CBC:   Lab Results   Component Value Date    WBC 6.7 02/27/2021    RBC 4.66 02/27/2021    HGB 14.5 02/27/2021    HCT 45.0 02/27/2021     02/27/2021       Assessment:      Active Problems:    Hip dislocation, right, sequela  Resolved Problems:    * No resolved hospital problems. *       Plan:   Stable status post total hip arthroplasty.    Plan for revision surgery on Monday  NPO at midnight       Signed By: Tru Jenkins

## 2021-02-28 NOTE — PLAN OF CARE
Problem: Falls - Risk of:  Goal: Will remain free from falls  Description: Will remain free from falls  Outcome: Ongoing    All fall precautions in place. Bed locked and in lowest position with alarm on. Overbed table and personal belonings within reach. Call light within reach and patient instructed to use call light for assistance. Non-skid socks on. Patient ambulating well with GB and walker. Problem: Pain:  Goal: Pain level will decrease  Description: Pain level will decrease  Outcome: Ongoing     Patient educated on assessing pain on 0-10 scale. Patient notes some soreness to her hip and buttocks and is currently rating pain at a 2. Pain is being controlled with scheduled tylenol. Patient states medication is relieving pain. Patient using rest, distraction, and emotional support as non-pharm interventions for pain.

## 2021-03-01 ENCOUNTER — APPOINTMENT (OUTPATIENT)
Dept: GENERAL RADIOLOGY | Age: 51
DRG: 468 | End: 2021-03-01
Attending: ORTHOPAEDIC SURGERY
Payer: COMMERCIAL

## 2021-03-01 ENCOUNTER — ANESTHESIA EVENT (OUTPATIENT)
Dept: OPERATING ROOM | Age: 51
DRG: 468 | End: 2021-03-01
Payer: COMMERCIAL

## 2021-03-01 ENCOUNTER — ANESTHESIA (OUTPATIENT)
Dept: OPERATING ROOM | Age: 51
DRG: 468 | End: 2021-03-01
Payer: COMMERCIAL

## 2021-03-01 VITALS — OXYGEN SATURATION: 93 % | DIASTOLIC BLOOD PRESSURE: 96 MMHG | SYSTOLIC BLOOD PRESSURE: 213 MMHG | TEMPERATURE: 98.4 F

## 2021-03-01 LAB
APPEARANCE FLUID: NORMAL
CELL COUNT FLUID TYPE: NORMAL
CLOT EVALUATION: NORMAL
COLOR FLUID: NORMAL
LYMPHOCYTES, BODY FLUID: 14 %
NEUTROPHIL, FLUID: 86 %
NUCLEATED CELLS FLUID: 2010 /CUMM
NUMBER OF CELLS COUNTED FLUID: 100
PREGNANCY, URINE: NEGATIVE
RBC FLUID: NORMAL /CUMM

## 2021-03-01 PROCEDURE — 89051 BODY FLUID CELL COUNT: CPT

## 2021-03-01 PROCEDURE — 6360000002 HC RX W HCPCS: Performed by: ANESTHESIOLOGY

## 2021-03-01 PROCEDURE — 3700000001 HC ADD 15 MINUTES (ANESTHESIA): Performed by: ORTHOPAEDIC SURGERY

## 2021-03-01 PROCEDURE — 6360000002 HC RX W HCPCS: Performed by: ORTHOPAEDIC SURGERY

## 2021-03-01 PROCEDURE — 3209999900 FLUORO FOR SURGICAL PROCEDURES

## 2021-03-01 PROCEDURE — 27134 REVISE HIP JOINT REPLACEMENT: CPT | Performed by: ORTHOPAEDIC SURGERY

## 2021-03-01 PROCEDURE — 7100000001 HC PACU RECOVERY - ADDTL 15 MIN: Performed by: ORTHOPAEDIC SURGERY

## 2021-03-01 PROCEDURE — 6360000002 HC RX W HCPCS: Performed by: PHYSICIAN ASSISTANT

## 2021-03-01 PROCEDURE — 6370000000 HC RX 637 (ALT 250 FOR IP): Performed by: PHYSICIAN ASSISTANT

## 2021-03-01 PROCEDURE — 1200000000 HC SEMI PRIVATE

## 2021-03-01 PROCEDURE — 0SR90JZ REPLACEMENT OF RIGHT HIP JOINT WITH SYNTHETIC SUBSTITUTE, OPEN APPROACH: ICD-10-PCS | Performed by: ORTHOPAEDIC SURGERY

## 2021-03-01 PROCEDURE — 6360000002 HC RX W HCPCS: Performed by: NURSE ANESTHETIST, CERTIFIED REGISTERED

## 2021-03-01 PROCEDURE — 3600000015 HC SURGERY LEVEL 5 ADDTL 15MIN: Performed by: ORTHOPAEDIC SURGERY

## 2021-03-01 PROCEDURE — G0378 HOSPITAL OBSERVATION PER HR: HCPCS

## 2021-03-01 PROCEDURE — 3600000005 HC SURGERY LEVEL 5 BASE: Performed by: ORTHOPAEDIC SURGERY

## 2021-03-01 PROCEDURE — 73502 X-RAY EXAM HIP UNI 2-3 VIEWS: CPT

## 2021-03-01 PROCEDURE — 88331 PATH CONSLTJ SURG 1 BLK 1SPC: CPT

## 2021-03-01 PROCEDURE — 6370000000 HC RX 637 (ALT 250 FOR IP): Performed by: INTERNAL MEDICINE

## 2021-03-01 PROCEDURE — 2709999900 HC NON-CHARGEABLE SUPPLY: Performed by: ORTHOPAEDIC SURGERY

## 2021-03-01 PROCEDURE — C1776 JOINT DEVICE (IMPLANTABLE): HCPCS | Performed by: ORTHOPAEDIC SURGERY

## 2021-03-01 PROCEDURE — 27134 REVISE HIP JOINT REPLACEMENT: CPT | Performed by: PHYSICIAN ASSISTANT

## 2021-03-01 PROCEDURE — 3700000000 HC ANESTHESIA ATTENDED CARE: Performed by: ORTHOPAEDIC SURGERY

## 2021-03-01 PROCEDURE — 2580000003 HC RX 258: Performed by: NURSE ANESTHETIST, CERTIFIED REGISTERED

## 2021-03-01 PROCEDURE — 2500000003 HC RX 250 WO HCPCS: Performed by: NURSE ANESTHETIST, CERTIFIED REGISTERED

## 2021-03-01 PROCEDURE — 2500000003 HC RX 250 WO HCPCS: Performed by: ANESTHESIOLOGY

## 2021-03-01 PROCEDURE — 84703 CHORIONIC GONADOTROPIN ASSAY: CPT

## 2021-03-01 PROCEDURE — 88305 TISSUE EXAM BY PATHOLOGIST: CPT

## 2021-03-01 PROCEDURE — 0SP90JZ REMOVAL OF SYNTHETIC SUBSTITUTE FROM RIGHT HIP JOINT, OPEN APPROACH: ICD-10-PCS | Performed by: ORTHOPAEDIC SURGERY

## 2021-03-01 PROCEDURE — 2720000010 HC SURG SUPPLY STERILE: Performed by: ORTHOPAEDIC SURGERY

## 2021-03-01 PROCEDURE — 7100000000 HC PACU RECOVERY - FIRST 15 MIN: Performed by: ORTHOPAEDIC SURGERY

## 2021-03-01 DEVICE — R3 20 DEGREE +4 XLPE ACETABULAR                                    LINER 32MM INNER DIAMETER X OUTER                                    DIAMETER 50MM
Type: IMPLANTABLE DEVICE | Site: HIP | Status: FUNCTIONAL
Brand: R3

## 2021-03-01 DEVICE — OXINIUM FEMORAL HEAD 12/14 TAPER                                    32MM +8
Type: IMPLANTABLE DEVICE | Site: HIP | Status: FUNCTIONAL
Brand: OXINIUM

## 2021-03-01 RX ORDER — SODIUM CHLORIDE, SODIUM LACTATE, POTASSIUM CHLORIDE, CALCIUM CHLORIDE 600; 310; 30; 20 MG/100ML; MG/100ML; MG/100ML; MG/100ML
INJECTION, SOLUTION INTRAVENOUS CONTINUOUS PRN
Status: DISCONTINUED | OUTPATIENT
Start: 2021-03-01 | End: 2021-03-01 | Stop reason: SDUPTHER

## 2021-03-01 RX ORDER — VANCOMYCIN HYDROCHLORIDE 1 G/20ML
INJECTION, POWDER, LYOPHILIZED, FOR SOLUTION INTRAVENOUS PRN
Status: DISCONTINUED | OUTPATIENT
Start: 2021-03-01 | End: 2021-03-01 | Stop reason: ALTCHOICE

## 2021-03-01 RX ORDER — ACETAMINOPHEN 325 MG/1
650 TABLET ORAL EVERY 6 HOURS
Status: DISCONTINUED | OUTPATIENT
Start: 2021-03-01 | End: 2021-03-01 | Stop reason: SDUPTHER

## 2021-03-01 RX ORDER — DEXAMETHASONE SODIUM PHOSPHATE 4 MG/ML
INJECTION, SOLUTION INTRA-ARTICULAR; INTRALESIONAL; INTRAMUSCULAR; INTRAVENOUS; SOFT TISSUE PRN
Status: DISCONTINUED | OUTPATIENT
Start: 2021-03-01 | End: 2021-03-01 | Stop reason: SDUPTHER

## 2021-03-01 RX ORDER — SODIUM CHLORIDE 450 MG/100ML
INJECTION, SOLUTION INTRAVENOUS CONTINUOUS
Status: DISCONTINUED | OUTPATIENT
Start: 2021-03-01 | End: 2021-03-01 | Stop reason: SDUPTHER

## 2021-03-01 RX ORDER — ONDANSETRON 2 MG/ML
4 INJECTION INTRAMUSCULAR; INTRAVENOUS
Status: COMPLETED | OUTPATIENT
Start: 2021-03-01 | End: 2021-03-01

## 2021-03-01 RX ORDER — LABETALOL HYDROCHLORIDE 5 MG/ML
5 INJECTION, SOLUTION INTRAVENOUS EVERY 10 MIN PRN
Status: DISCONTINUED | OUTPATIENT
Start: 2021-03-01 | End: 2021-03-01 | Stop reason: HOSPADM

## 2021-03-01 RX ORDER — HYDROMORPHONE HCL 110MG/55ML
PATIENT CONTROLLED ANALGESIA SYRINGE INTRAVENOUS PRN
Status: DISCONTINUED | OUTPATIENT
Start: 2021-03-01 | End: 2021-03-01 | Stop reason: SDUPTHER

## 2021-03-01 RX ORDER — FENTANYL CITRATE 50 UG/ML
25 INJECTION, SOLUTION INTRAMUSCULAR; INTRAVENOUS EVERY 5 MIN PRN
Status: DISCONTINUED | OUTPATIENT
Start: 2021-03-01 | End: 2021-03-01 | Stop reason: HOSPADM

## 2021-03-01 RX ORDER — LIDOCAINE HYDROCHLORIDE 20 MG/ML
INJECTION, SOLUTION EPIDURAL; INFILTRATION; INTRACAUDAL; PERINEURAL PRN
Status: DISCONTINUED | OUTPATIENT
Start: 2021-03-01 | End: 2021-03-01 | Stop reason: SDUPTHER

## 2021-03-01 RX ORDER — CEFAZOLIN SODIUM 1 G/3ML
INJECTION, POWDER, FOR SOLUTION INTRAMUSCULAR; INTRAVENOUS PRN
Status: DISCONTINUED | OUTPATIENT
Start: 2021-03-01 | End: 2021-03-01 | Stop reason: SDUPTHER

## 2021-03-01 RX ORDER — ONDANSETRON 2 MG/ML
INJECTION INTRAMUSCULAR; INTRAVENOUS PRN
Status: DISCONTINUED | OUTPATIENT
Start: 2021-03-01 | End: 2021-03-01 | Stop reason: SDUPTHER

## 2021-03-01 RX ORDER — OXYCODONE HYDROCHLORIDE AND ACETAMINOPHEN 5; 325 MG/1; MG/1
1 TABLET ORAL PRN
Status: DISCONTINUED | OUTPATIENT
Start: 2021-03-01 | End: 2021-03-01 | Stop reason: HOSPADM

## 2021-03-01 RX ORDER — SODIUM CHLORIDE 0.9 % (FLUSH) 0.9 %
10 SYRINGE (ML) INJECTION PRN
Status: DISCONTINUED | OUTPATIENT
Start: 2021-03-01 | End: 2021-03-01 | Stop reason: SDUPTHER

## 2021-03-01 RX ORDER — FENTANYL CITRATE 50 UG/ML
INJECTION, SOLUTION INTRAMUSCULAR; INTRAVENOUS PRN
Status: DISCONTINUED | OUTPATIENT
Start: 2021-03-01 | End: 2021-03-01 | Stop reason: SDUPTHER

## 2021-03-01 RX ORDER — DIPHENHYDRAMINE HYDROCHLORIDE 50 MG/ML
12.5 INJECTION INTRAMUSCULAR; INTRAVENOUS
Status: DISCONTINUED | OUTPATIENT
Start: 2021-03-01 | End: 2021-03-01 | Stop reason: HOSPADM

## 2021-03-01 RX ORDER — ROCURONIUM BROMIDE 10 MG/ML
INJECTION, SOLUTION INTRAVENOUS PRN
Status: DISCONTINUED | OUTPATIENT
Start: 2021-03-01 | End: 2021-03-01 | Stop reason: SDUPTHER

## 2021-03-01 RX ORDER — PROCHLORPERAZINE EDISYLATE 5 MG/ML
5 INJECTION INTRAMUSCULAR; INTRAVENOUS
Status: DISCONTINUED | OUTPATIENT
Start: 2021-03-01 | End: 2021-03-01 | Stop reason: HOSPADM

## 2021-03-01 RX ORDER — CEFAZOLIN SODIUM 2 G/50ML
2000 SOLUTION INTRAVENOUS EVERY 8 HOURS
Status: COMPLETED | OUTPATIENT
Start: 2021-03-01 | End: 2021-03-02

## 2021-03-01 RX ORDER — TRANEXAMIC ACID 100 MG/ML
1500 INJECTION, SOLUTION INTRAVENOUS ONCE
Status: DISCONTINUED | OUTPATIENT
Start: 2021-03-01 | End: 2021-03-01 | Stop reason: HOSPADM

## 2021-03-01 RX ORDER — OXYCODONE HYDROCHLORIDE 5 MG/1
5 TABLET ORAL EVERY 4 HOURS PRN
Status: DISCONTINUED | OUTPATIENT
Start: 2021-03-01 | End: 2021-03-02 | Stop reason: HOSPADM

## 2021-03-01 RX ORDER — MIDAZOLAM HYDROCHLORIDE 1 MG/ML
INJECTION INTRAMUSCULAR; INTRAVENOUS PRN
Status: DISCONTINUED | OUTPATIENT
Start: 2021-03-01 | End: 2021-03-01 | Stop reason: SDUPTHER

## 2021-03-01 RX ORDER — PROPOFOL 10 MG/ML
INJECTION, EMULSION INTRAVENOUS PRN
Status: DISCONTINUED | OUTPATIENT
Start: 2021-03-01 | End: 2021-03-01 | Stop reason: SDUPTHER

## 2021-03-01 RX ORDER — MEPERIDINE HYDROCHLORIDE 25 MG/ML
12.5 INJECTION INTRAMUSCULAR; INTRAVENOUS; SUBCUTANEOUS EVERY 5 MIN PRN
Status: DISCONTINUED | OUTPATIENT
Start: 2021-03-01 | End: 2021-03-01 | Stop reason: HOSPADM

## 2021-03-01 RX ORDER — OXYCODONE HYDROCHLORIDE AND ACETAMINOPHEN 5; 325 MG/1; MG/1
2 TABLET ORAL PRN
Status: DISCONTINUED | OUTPATIENT
Start: 2021-03-01 | End: 2021-03-01 | Stop reason: HOSPADM

## 2021-03-01 RX ORDER — SODIUM CHLORIDE 0.9 % (FLUSH) 0.9 %
10 SYRINGE (ML) INJECTION EVERY 12 HOURS SCHEDULED
Status: DISCONTINUED | OUTPATIENT
Start: 2021-03-01 | End: 2021-03-01 | Stop reason: SDUPTHER

## 2021-03-01 RX ORDER — PREDNISONE 10 MG/1
10 TABLET ORAL DAILY
Status: DISCONTINUED | OUTPATIENT
Start: 2021-03-01 | End: 2021-03-02 | Stop reason: HOSPADM

## 2021-03-01 RX ORDER — SENNA AND DOCUSATE SODIUM 50; 8.6 MG/1; MG/1
1 TABLET, FILM COATED ORAL 2 TIMES DAILY
Status: DISCONTINUED | OUTPATIENT
Start: 2021-03-01 | End: 2021-03-01 | Stop reason: SDUPTHER

## 2021-03-01 RX ORDER — ONDANSETRON 2 MG/ML
4 INJECTION INTRAMUSCULAR; INTRAVENOUS EVERY 6 HOURS PRN
Status: DISCONTINUED | OUTPATIENT
Start: 2021-03-01 | End: 2021-03-01

## 2021-03-01 RX ORDER — PROMETHAZINE HYDROCHLORIDE 12.5 MG/1
12.5 TABLET ORAL EVERY 6 HOURS PRN
Status: DISCONTINUED | OUTPATIENT
Start: 2021-03-01 | End: 2021-03-01

## 2021-03-01 RX ORDER — HYDRALAZINE HYDROCHLORIDE 20 MG/ML
5 INJECTION INTRAMUSCULAR; INTRAVENOUS EVERY 10 MIN PRN
Status: DISCONTINUED | OUTPATIENT
Start: 2021-03-01 | End: 2021-03-01 | Stop reason: HOSPADM

## 2021-03-01 RX ORDER — TIZANIDINE 4 MG/1
4 TABLET ORAL EVERY 6 HOURS PRN
Status: DISCONTINUED | OUTPATIENT
Start: 2021-03-01 | End: 2021-03-02 | Stop reason: HOSPADM

## 2021-03-01 RX ORDER — OXYCODONE HYDROCHLORIDE 5 MG/1
10 TABLET ORAL EVERY 4 HOURS PRN
Status: DISCONTINUED | OUTPATIENT
Start: 2021-03-01 | End: 2021-03-02 | Stop reason: HOSPADM

## 2021-03-01 RX ORDER — FENTANYL CITRATE 50 UG/ML
50 INJECTION, SOLUTION INTRAMUSCULAR; INTRAVENOUS EVERY 5 MIN PRN
Status: DISCONTINUED | OUTPATIENT
Start: 2021-03-01 | End: 2021-03-01 | Stop reason: HOSPADM

## 2021-03-01 RX ADMIN — PREDNISONE 10 MG: 10 TABLET ORAL at 18:07

## 2021-03-01 RX ADMIN — ROCURONIUM BROMIDE 20 MG: 10 INJECTION INTRAVENOUS at 12:49

## 2021-03-01 RX ADMIN — Medication 5 MG: at 23:48

## 2021-03-01 RX ADMIN — FENTANYL CITRATE 100 MCG: 50 INJECTION, SOLUTION INTRAMUSCULAR; INTRAVENOUS at 12:21

## 2021-03-01 RX ADMIN — CEFAZOLIN SODIUM 2000 MG: 2 SOLUTION INTRAVENOUS at 15:28

## 2021-03-01 RX ADMIN — HYDROMORPHONE HYDROCHLORIDE 0.25 MG: 1 INJECTION, SOLUTION INTRAMUSCULAR; INTRAVENOUS; SUBCUTANEOUS at 15:12

## 2021-03-01 RX ADMIN — LIDOCAINE HYDROCHLORIDE 50 MG: 20 INJECTION, SOLUTION EPIDURAL; INFILTRATION; INTRACAUDAL; PERINEURAL at 11:44

## 2021-03-01 RX ADMIN — OXYCODONE 10 MG: 5 TABLET ORAL at 18:07

## 2021-03-01 RX ADMIN — METOPROLOL SUCCINATE 25 MG: 50 TABLET, EXTENDED RELEASE ORAL at 20:30

## 2021-03-01 RX ADMIN — DEXAMETHASONE SODIUM PHOSPHATE 4 MG: 4 INJECTION, SOLUTION INTRAMUSCULAR; INTRAVENOUS at 11:51

## 2021-03-01 RX ADMIN — ASPIRIN 325 MG: 325 TABLET, COATED ORAL at 20:28

## 2021-03-01 RX ADMIN — CEFAZOLIN SODIUM 2000 MG: 1 POWDER, FOR SOLUTION INTRAMUSCULAR; INTRAVENOUS at 11:50

## 2021-03-01 RX ADMIN — CEFAZOLIN SODIUM 2000 MG: 2 SOLUTION INTRAVENOUS at 23:48

## 2021-03-01 RX ADMIN — SODIUM CHLORIDE, SODIUM LACTATE, POTASSIUM CHLORIDE, AND CALCIUM CHLORIDE: .6; .31; .03; .02 INJECTION, SOLUTION INTRAVENOUS at 11:20

## 2021-03-01 RX ADMIN — MIDAZOLAM HYDROCHLORIDE 2 MG: 2 INJECTION, SOLUTION INTRAMUSCULAR; INTRAVENOUS at 11:33

## 2021-03-01 RX ADMIN — LABETALOL HYDROCHLORIDE 5 MG: 5 INJECTION, SOLUTION INTRAVENOUS at 15:42

## 2021-03-01 RX ADMIN — FENTANYL CITRATE 50 MCG: 50 INJECTION INTRAMUSCULAR; INTRAVENOUS at 14:50

## 2021-03-01 RX ADMIN — METRONIDAZOLE 500 MG: 500 TABLET ORAL at 20:28

## 2021-03-01 RX ADMIN — ACETAMINOPHEN 650 MG: 325 TABLET ORAL at 20:29

## 2021-03-01 RX ADMIN — HYDROMORPHONE HYDROCHLORIDE 0.25 MG: 1 INJECTION, SOLUTION INTRAMUSCULAR; INTRAVENOUS; SUBCUTANEOUS at 15:41

## 2021-03-01 RX ADMIN — HYDROMORPHONE HYDROCHLORIDE 0.5 MG: 2 INJECTION, SOLUTION INTRAMUSCULAR; INTRAVENOUS; SUBCUTANEOUS at 14:21

## 2021-03-01 RX ADMIN — GABAPENTIN 400 MG: 400 CAPSULE ORAL at 18:07

## 2021-03-01 RX ADMIN — DOCUSATE SODIUM 50 MG AND SENNOSIDES 8.6 MG 1 TABLET: 8.6; 5 TABLET, FILM COATED ORAL at 20:29

## 2021-03-01 RX ADMIN — HYDROMORPHONE HYDROCHLORIDE 0.5 MG: 2 INJECTION, SOLUTION INTRAMUSCULAR; INTRAVENOUS; SUBCUTANEOUS at 14:14

## 2021-03-01 RX ADMIN — ROCURONIUM BROMIDE 60 MG: 10 INJECTION INTRAVENOUS at 11:44

## 2021-03-01 RX ADMIN — PROPOFOL 200 MG: 10 INJECTION, EMULSION INTRAVENOUS at 11:44

## 2021-03-01 RX ADMIN — ONDANSETRON 4 MG: 2 INJECTION INTRAMUSCULAR; INTRAVENOUS at 11:51

## 2021-03-01 RX ADMIN — FENTANYL CITRATE 50 MCG: 50 INJECTION, SOLUTION INTRAMUSCULAR; INTRAVENOUS at 11:43

## 2021-03-01 RX ADMIN — PHENYLEPHRINE HYDROCHLORIDE 100 MCG: 10 INJECTION, SOLUTION INTRAMUSCULAR; INTRAVENOUS; SUBCUTANEOUS at 13:26

## 2021-03-01 RX ADMIN — GABAPENTIN 400 MG: 400 CAPSULE ORAL at 20:29

## 2021-03-01 RX ADMIN — HYDROMORPHONE HYDROCHLORIDE 1 MG: 2 INJECTION, SOLUTION INTRAMUSCULAR; INTRAVENOUS; SUBCUTANEOUS at 12:32

## 2021-03-01 RX ADMIN — FENTANYL CITRATE 50 MCG: 50 INJECTION, SOLUTION INTRAMUSCULAR; INTRAVENOUS at 11:33

## 2021-03-01 RX ADMIN — ONDANSETRON 4 MG: 2 INJECTION INTRAMUSCULAR; INTRAVENOUS at 14:59

## 2021-03-01 RX ADMIN — SUGAMMADEX 200 MG: 100 INJECTION, SOLUTION INTRAVENOUS at 14:22

## 2021-03-01 RX ADMIN — PHENYLEPHRINE HYDROCHLORIDE 100 MCG: 10 INJECTION, SOLUTION INTRAMUSCULAR; INTRAVENOUS; SUBCUTANEOUS at 12:41

## 2021-03-01 RX ADMIN — HYDROMORPHONE HYDROCHLORIDE 0.5 MG: 1 INJECTION, SOLUTION INTRAMUSCULAR; INTRAVENOUS; SUBCUTANEOUS at 15:29

## 2021-03-01 ASSESSMENT — PULMONARY FUNCTION TESTS
PIF_VALUE: 28
PIF_VALUE: 27
PIF_VALUE: 1
PIF_VALUE: 27
PIF_VALUE: 28
PIF_VALUE: 28
PIF_VALUE: 27
PIF_VALUE: 1
PIF_VALUE: 25
PIF_VALUE: 28
PIF_VALUE: 23
PIF_VALUE: 28
PIF_VALUE: 28
PIF_VALUE: 26
PIF_VALUE: 27
PIF_VALUE: 1
PIF_VALUE: 28
PIF_VALUE: 20
PIF_VALUE: 27
PIF_VALUE: 27
PIF_VALUE: 28
PIF_VALUE: 28
PIF_VALUE: 27
PIF_VALUE: 23
PIF_VALUE: 19
PIF_VALUE: 28
PIF_VALUE: 27
PIF_VALUE: 28
PIF_VALUE: 29
PIF_VALUE: 25
PIF_VALUE: 0
PIF_VALUE: 5
PIF_VALUE: 26
PIF_VALUE: 25
PIF_VALUE: 25
PIF_VALUE: 29
PIF_VALUE: 27
PIF_VALUE: 28
PIF_VALUE: 29
PIF_VALUE: 24
PIF_VALUE: 27
PIF_VALUE: 29
PIF_VALUE: 28
PIF_VALUE: 27
PIF_VALUE: 28
PIF_VALUE: 29
PIF_VALUE: 26
PIF_VALUE: 28
PIF_VALUE: 28
PIF_VALUE: 27
PIF_VALUE: 28
PIF_VALUE: 27
PIF_VALUE: 28
PIF_VALUE: 28
PIF_VALUE: 27
PIF_VALUE: 28
PIF_VALUE: 31
PIF_VALUE: 26
PIF_VALUE: 30
PIF_VALUE: 20
PIF_VALUE: 27
PIF_VALUE: 29
PIF_VALUE: 29
PIF_VALUE: 28
PIF_VALUE: 27
PIF_VALUE: 19
PIF_VALUE: 26
PIF_VALUE: 26
PIF_VALUE: 3
PIF_VALUE: 27
PIF_VALUE: 13
PIF_VALUE: 27
PIF_VALUE: 28
PIF_VALUE: 28
PIF_VALUE: 27
PIF_VALUE: 25
PIF_VALUE: 29
PIF_VALUE: 27
PIF_VALUE: 28
PIF_VALUE: 27
PIF_VALUE: 28
PIF_VALUE: 23
PIF_VALUE: 28
PIF_VALUE: 29
PIF_VALUE: 27
PIF_VALUE: 28
PIF_VALUE: 27

## 2021-03-01 ASSESSMENT — PAIN DESCRIPTION - PROGRESSION
CLINICAL_PROGRESSION: GRADUALLY IMPROVING
CLINICAL_PROGRESSION: NOT CHANGED

## 2021-03-01 ASSESSMENT — PAIN DESCRIPTION - ORIENTATION
ORIENTATION: RIGHT

## 2021-03-01 ASSESSMENT — PAIN DESCRIPTION - DESCRIPTORS
DESCRIPTORS: ACHING
DESCRIPTORS: BURNING

## 2021-03-01 ASSESSMENT — PAIN SCALES - GENERAL
PAINLEVEL_OUTOF10: 4
PAINLEVEL_OUTOF10: 7
PAINLEVEL_OUTOF10: 0
PAINLEVEL_OUTOF10: 5
PAINLEVEL_OUTOF10: 0
PAINLEVEL_OUTOF10: 7
PAINLEVEL_OUTOF10: 0
PAINLEVEL_OUTOF10: 7
PAINLEVEL_OUTOF10: 5
PAINLEVEL_OUTOF10: 2

## 2021-03-01 ASSESSMENT — PAIN DESCRIPTION - FREQUENCY: FREQUENCY: CONTINUOUS

## 2021-03-01 ASSESSMENT — PAIN DESCRIPTION - PAIN TYPE
TYPE: SURGICAL PAIN

## 2021-03-01 ASSESSMENT — PAIN DESCRIPTION - LOCATION
LOCATION: HIP
LOCATION: GROIN

## 2021-03-01 ASSESSMENT — LIFESTYLE VARIABLES: SMOKING_STATUS: 0

## 2021-03-01 ASSESSMENT — PAIN DESCRIPTION - ONSET: ONSET: ON-GOING

## 2021-03-01 ASSESSMENT — PAIN - FUNCTIONAL ASSESSMENT: PAIN_FUNCTIONAL_ASSESSMENT: PREVENTS OR INTERFERES SOME ACTIVE ACTIVITIES AND ADLS

## 2021-03-01 NOTE — BRIEF OP NOTE
Brief Postoperative Note      Patient: Kamila Velasquez  YOB: 1970  MRN: [id number]    Date of Procedure: 3/1/2021    Pre-Op Diagnosis: Dislocated Total Hip - Right    Post-Op Diagnosis: Same       Procedure(s):  HIP TOTAL ARTHROPLASTY ANTERIOR APPROACH REVISION    Surgeon(s):  DO Kesha Chaves MD    Assistant:  Surgical Assistant: Jennie Dao; Ronda Suero  Physician Assistant: ERICA Benitez    Anesthesia: General    Estimated Blood Loss (mL): 074     Complications: None    Specimens:   ID Type Source Tests Collected by Time Destination   1 : FLUID FOR CELL COUNT Body Fluid Synovial BODY FLUID CELL COUNT WITH DIFFERENTIAL Kesha Peña MD 3/1/2021 1248    A : FROZEN RIGHT HIP CAPSULAR TISSUE Tissue Tissue SURGICAL PATHOLOGY Kesha Peña MD 3/1/2021 1252        Implants:  * No implants in log *      Drains:   [REMOVED] Negative Pressure Wound Therapy Leg Anterior;Proximal;Right;Upper (Removed)       Findings: See formal op report    Electronically signed by Enrique Dong DO on 3/1/2021 at 1:56 PM

## 2021-03-01 NOTE — ANESTHESIA PRE PROCEDURE
Department of Anesthesiology  Preprocedure Note       Name:  Beckie Marley   Age:  45 y.o.  :  1970                                          MRN:  [id number]         Date:  3/1/2021      Surgeon: Fawad Terry):  Lauren Yuen MD    Procedure: Procedure(s):  RIGHT TOTAL HIP ARTHROPLASTY ANTERIOR APPROACH    Medications prior to admission:   Prior to Admission medications    Medication Sig Start Date End Date Taking? Authorizing Provider   metroNIDAZOLE (FLAGYL) 500 MG tablet Take 500 mg by mouth 2 times daily 21   Historical Provider, MD   metoprolol succinate (TOPROL XL) 25 MG extended release tablet Take 25 mg by mouth nightly 21   Historical Provider, MD   aspirin 81 MG chewable tablet Take 81 mg by mouth daily    Historical Provider, MD   bisacodyl (DULCOLAX) 5 MG EC tablet Take 1 tablet by mouth 2 times daily as needed for Constipation 20   ERICA Esteban   tiZANidine (ZANAFLEX) 4 MG tablet Take 1 tablet by mouth every 8 hours as needed (muscle spasm) 20   ERICA Esteban   vitamin B-12 (CYANOCOBALAMIN) 1000 MCG tablet Take 1,000 mcg by mouth daily    Historical Provider, MD   gabapentin (NEURONTIN) 400 MG capsule Take 400 mg by mouth 3 times daily. Historical Provider, MD   meloxicam (MOBIC) 15 MG tablet Take 15 mg by mouth daily    Historical Provider, MD   omeprazole (PRILOSEC) 20 MG delayed release capsule Take 20 mg by mouth daily    Historical Provider, MD   cetirizine (ZYRTEC) 10 MG tablet Take 10 mg by mouth daily    Historical Provider, MD   cycloSPORINE (RESTASIS) 0.05 % ophthalmic emulsion 1 drop 2 times daily    Historical Provider, MD   lisinopril-hydrochlorothiazide (PRINZIDE;ZESTORETIC) 20-25 MG per tablet Take 1 tablet by mouth daily. Historical Provider, MD   acyclovir (ZOVIRAX) 200 MG capsule Take 800 mg by mouth daily. Historical Provider, MD   DULoxetine (CYMBALTA) 60 MG capsule Take 60 mg by mouth daily.     Historical Provider, MD GLUCOSAMINE HCL-MSM PO Take  by mouth. Historical Provider, MD   Polyethylene Glycol 3350 (MIRALAX PO) Take  by mouth. Historical Provider, MD       Current medications:    No current facility-administered medications for this visit. No current outpatient medications on file.      Facility-Administered Medications Ordered in Other Visits   Medication Dose Route Frequency Provider Last Rate Last Admin    phenol 1.4 % mouth spray 1 spray  1 spray Mouth/Throat Q2H PRN Brian Muniz MD        acyclovir (ZOVIRAX) capsule 800 mg  800 mg Oral Daily ERICA Diehl   800 mg at 02/28/21 0759    bisacodyl (DULCOLAX) EC tablet 5 mg  5 mg Oral BID PRN ERICA Rodriguez        cetirizine (ZYRTEC) tablet 10 mg  10 mg Oral Daily Major Rodriguez   10 mg at 02/28/21 0759    polyvinyl alcohol (LIQUIFILM TEARS) 1.4 % ophthalmic solution 2 drop  2 drop Both Eyes Q4H PRN ERICA Rodriguez        DULoxetine (CYMBALTA) extended release capsule 60 mg  60 mg Oral Daily Major Diehl   60 mg at 02/28/21 0759    gabapentin (NEURONTIN) capsule 400 mg  400 mg Oral TID ERICA Rodriguez   400 mg at 02/28/21 2035    lisinopril-hydroCHLOROthiazide (PRINZIDE;ZESTORETIC) 20-25 MG per tablet 1 tablet  1 tablet Oral Daily Major Rodriguez   1 tablet at 02/28/21 0800    metoprolol succinate (TOPROL XL) extended release tablet 25 mg  25 mg Oral Nightly ERICA Rodriguez   25 mg at 02/28/21 2035    metroNIDAZOLE (FLAGYL) tablet 500 mg  500 mg Oral BID ERICA Diehl   500 mg at 02/28/21 2036    pantoprazole (PROTONIX) tablet 40 mg  40 mg Oral QAM AC Major Diehl   40 mg at 02/28/21 6021    polyethylene glycol (GLYCOLAX) powder 17 g  17 g Oral Daily Major Rodriguez        tiZANidine (ZANAFLEX) tablet 4 mg  4 mg Oral Q8H PRN ERICA Rodriguez        vitamin B-12 (CYANOCOBALAMIN) tablet 1,000 mcg  1,000 mcg Oral Daily Major Rodriguez   Stopped at 03/01/21 5372  sodium chloride flush 0.9 % injection 10 mL  10 mL Intravenous 2 times per day ERICA Bautista   10 mL at 02/28/21 2039    sodium chloride flush 0.9 % injection 10 mL  10 mL Intravenous PRN ERICA Bautista        acetaminophen (TYLENOL) tablet 650 mg  650 mg Oral Q6H Major Bautista   650 mg at 02/28/21 2035    sennosides-docusate sodium (SENOKOT-S) 8.6-50 MG tablet 1 tablet  1 tablet Oral BID ERICA Bautista   Stopped at 03/01/21 0810    magnesium hydroxide (MILK OF MAGNESIA) 400 MG/5ML suspension 30 mL  30 mL Oral Daily PRN ERICA Bautista        0.45 % sodium chloride infusion   Intravenous Continuous ERICA Bautista        oxyCODONE (ROXICODONE) immediate release tablet 5 mg  5 mg Oral Q4H PRN ERICA Bautista        Or    oxyCODONE (ROXICODONE) immediate release tablet 10 mg  10 mg Oral Q4H PRN ERICA Bautista        promethazine (PHENERGAN) tablet 12.5 mg  12.5 mg Oral Q6H PRN ERICA Bautista        Or    ondansetron [facility]) injection 4 mg  4 mg Intravenous Q6H PRN ERICA Bautista        labetalol (NORMODYNE;TRANDATE) injection 10 mg  10 mg Intravenous Q4H PRN Ria Verde DO        melatonin disintegrating tablet 5 mg  5 mg Oral Nightly Ryan Verde DO   5 mg at 02/28/21 2035       Allergies:     Allergies   Allergen Reactions    Adhesive Tape      bandaids  bandaids         Problem List:    Patient Active Problem List   Diagnosis Code    Primary osteoarthritis of right hip M16.11    Status post total hip replacement, right Z96.641    Hypertension I10    Hyperlipidemia E78.5    Thyroid disease E07.9    Right hip pain M25.551    Hip dislocation, right, sequela S73.004S       Past Medical History:        Diagnosis Date    Arthritis     Hyperlipidemia     Hypertension     Thyroid disease     was hyperthyroid       Past Surgical History:        Procedure Laterality Date    COLONOSCOPY      FOOT SURGERY Bilateral     3 on each foot  OTHER SURGICAL HISTORY      Spinal stimulator left hip    REVISION TOTAL HIP ARTHROPLASTY Right 2021    RIGHT HIP CLOSED REDUCTION performed by Derrick Rose MD at [address] Right 8/10/2020    RIGHT TOTAL HIP ARTHROPLASTY ANTERIOR APPROACH performed by Derrick Rose MD at [address] TYMPANOSTOMY TUBE PLACEMENT Bilateral        Social History:    Social History     Tobacco Use    Smoking status: Former Smoker     Quit date: 10/17/2018     Years since quittin.3    Smokeless tobacco: Never Used   Substance Use Topics    Alcohol use: Not on file     Comment: occassional                                Counseling given: Not Answered      Vital Signs (Current): There were no vitals filed for this visit. BP Readings from Last 3 Encounters:   21 (!) 137/94   21 (!) 175/99   21 (!) 215/138       NPO Status:                                                                                 BMI:   Wt Readings from Last 3 Encounters:   21 230 lb (104.3 kg)   21 230 lb (104.3 kg)   10/01/20 230 lb (104.3 kg)     There is no height or weight on file to calculate BMI.    CBC:   Lab Results   Component Value Date    WBC 6.7 2021    RBC 4.66 2021    HGB 14.5 2021    HCT 45.0 2021    MCV 96.6 2021    RDW 15.9 2021     2021       CMP:   Lab Results   Component Value Date     2021    K 4.1 2021    K 4.6 2021     2021    CO2 26 2021    BUN 13 2021    CREATININE 0.7 2021    GFRAA >60 2021    LABGLOM >60 2021    GLUCOSE 114 2021    CALCIUM 9.4 2021       POC Tests: No results for input(s): POCGLU, POCNA, POCK, POCCL, POCBUN, POCHEMO, POCHCT in the last 72 hours.     Coags:   Lab Results   Component Value Date    APTT 34.4 08/10/2020       HCG (If Applicable):   Lab Results Component Value Date    PREGTESTUR Negative 08/10/2020        ABGs: No results found for: PHART, PO2ART, QDN0XNM, OTU1EQM, BEART, H2VYXIYN     Type & Screen (If Applicable):  No results found for: LABABO, LABRH    Drug/Infectious Status (If Applicable):  No results found for: HIV, HEPCAB    COVID-19 Screening (If Applicable):   Lab Results   Component Value Date    COVID19 NOT DETECTED 08/05/2020         Anesthesia Evaluation  Patient summary reviewed and Nursing notes reviewed  Airway: Mallampati: II  TM distance: >3 FB   Neck ROM: full  Mouth opening: > = 3 FB Dental:          Pulmonary:Negative Pulmonary ROS and normal exam  breath sounds clear to auscultation      (-) COPD and not a current smoker          Patient did not smoke on day of surgery. Cardiovascular:  Exercise tolerance: good (>4 METS),   (+) hypertension: mild,     (-)  angina and  ENGEL      Rhythm: regular  Rate: normal           Beta Blocker:  Not on Beta Blocker         Neuro/Psych:   Negative Neuro/Psych ROS     (-) seizures           GI/Hepatic/Renal:   (+) GERD: well controlled,      (-) liver disease       Endo/Other: Negative Endo/Other ROS       (-) diabetes mellitus               Abdominal:       Abdomen: soft. Vascular: negative vascular ROS. Anesthesia Plan      general     ASA 3 - emergent     (-npo mn  -daily marajuana  -recent hip surgery in 8/2020, Friday she had her hip relocated.  -lisinopril was held today, bp 139/80)  Induction: intravenous. MIPS: Postoperative opioids intended and Prophylactic antiemetics administered. Anesthetic plan and risks discussed with patient. Use of blood products discussed with patient whom consented to blood products. Plan discussed with attending and CRNA.     Attending anesthesiologist reviewed and agrees with Pre Eval content            Ebony Peñaloza MD   3/1/2021

## 2021-03-01 NOTE — ANESTHESIA POSTPROCEDURE EVALUATION
Department of Anesthesiology  Postprocedure Note    Patient: Fede Rivera  MRN: [id number]  YOB: 1970  Date of evaluation: 3/1/2021  Time:  7:22 AM     Procedure Summary     Date: 02/26/21 Room / Location: [facility] [address] [place] [address] / [facility]    Anesthesia Start: 1807 Anesthesia Stop: 1847    Procedure: RIGHT HIP CLOSED REDUCTION (Right Hip) Diagnosis: (RIGHT HIP DISLOCATION)    Surgeons: Messi Major MD Responsible Provider: Nadine Adams MD    Anesthesia Type: general ASA Status: 3 - Emergent          Anesthesia Type: general    Gladys Phase I: Gladys Score: 9    Gladys Phase II:      Last vitals: Reviewed and per EMR flowsheets.        Anesthesia Post Evaluation    Patient location during evaluation: PACU  Patient participation: complete - patient participated  Level of consciousness: awake  Pain score: 0  Airway patency: patent  Nausea & Vomiting: no nausea and no vomiting  Complications: no  Cardiovascular status: hemodynamically stable  Respiratory status: acceptable  Hydration status: euvolemic

## 2021-03-01 NOTE — PLAN OF CARE
Problem: Falls - Risk of:  Goal: Will remain free from falls  Description: Will remain free from falls  3/1/2021 0024 by Coit Quant  Outcome: Ongoing  2/28/2021 1525 by Chester Deshpande RN  Outcome: Ongoing  Goal: Absence of physical injury  Description: Absence of physical injury  3/1/2021 0024 by Coit Quant  Outcome: Ongoing  2/28/2021 1525 by Chester Deshpande RN  Outcome: Ongoing     Problem: Pain:  Goal: Pain level will decrease  Description: Pain level will decrease  3/1/2021 0024 by Coit Quant  Outcome: Ongoing  2/28/2021 1525 by Chester Deshpande RN  Outcome: Ongoing  Goal: Control of acute pain  Description: Control of acute pain  3/1/2021 0024 by Coit Quant  Outcome: Ongoing  2/28/2021 1525 by Chester Deshpande RN  Outcome: Ongoing  Goal: Control of chronic pain  Description: Control of chronic pain  Outcome: Ongoing   Continue with plan of care.

## 2021-03-01 NOTE — PROGRESS NOTES
Return from pacu dressing clean dry and intact , wound vac in place , positive dorsi flex , skin warm and pink,  at bed side ,

## 2021-03-01 NOTE — CARE COORDINATION
Patient here from home with . OR today for R hip. CM will continue to follow post op for discharge planning.   Snow Chavez RN  RN Case Manager  [phone]

## 2021-03-01 NOTE — PROGRESS NOTES
Npo . For surgery , consent signed , talked with anesthesia , all med's held , pt called  to instruct that surgery was changed from 1330 pm to 9 am , no present pain

## 2021-03-01 NOTE — PROGRESS NOTES
Also talked with anesthesia , blood hCG test ordered , surgery aware the lab drawl was not done prior to pacu transport

## 2021-03-01 NOTE — PROGRESS NOTES
Pt arrived from OR, s/p HIP TOTAL ARTHROPLASTY ANTERIOR APPROACH REVISION - Right, reprot received from CRNA, pt received 2 mg of dilaudid in OR, wound vac in place to right hip.     General

## 2021-03-01 NOTE — PROGRESS NOTES
PACU Transfer Note    Vitals:    03/01/21 1555   BP: (!) 150/90   Pulse: 74   Resp: 14   Temp: 97.2 °F (36.2 °C)   SpO2: 97%       In: 558 [P.O.:440; I.V.:118]  Out: -     Pain assessment: BP in range  Pain Level: 4    Report given to Receiving unit RN.    3/1/2021 3:57 PM

## 2021-03-01 NOTE — PROGRESS NOTES
Hospitalist Progress Note      PCP: Fish Funk    Date of Admission: 2/26/2021    Chief Complaint: Right hip pain    Hospital Course: Admitted for right hip pain secondary to dislocation of hip arthroplasty. Status post total hip arthroplasty relocation. Plan for revision surgery on Monday. We are consulted for medical management of comorbidities. Subjective: Patient in OR.     Medications:  Reviewed    Infusion Medications    sodium chloride       Scheduled Medications    acyclovir  800 mg Oral Daily    cetirizine  10 mg Oral Daily    DULoxetine  60 mg Oral Daily    gabapentin  400 mg Oral TID    lisinopril-hydroCHLOROthiazide  1 tablet Oral Daily    metoprolol succinate  25 mg Oral Nightly    metroNIDAZOLE  500 mg Oral BID    pantoprazole  40 mg Oral QAM AC    polyethylene glycol  17 g Oral Daily    vitamin B-12  1,000 mcg Oral Daily    sodium chloride flush  10 mL Intravenous 2 times per day    acetaminophen  650 mg Oral Q6H    sennosides-docusate sodium  1 tablet Oral BID    melatonin  5 mg Oral Nightly     PRN Meds: meperidine, fentanNYL, fentanNYL, HYDROmorphone, HYDROmorphone, oxyCODONE-acetaminophen **OR** oxyCODONE-acetaminophen, prochlorperazine, ondansetron, diphenhydrAMINE, labetalol, hydrALAZINE, phenol, bisacodyl, polyvinyl alcohol, tiZANidine, sodium chloride flush, magnesium hydroxide, oxyCODONE **OR** oxyCODONE, promethazine **OR** ondansetron, labetalol      Intake/Output Summary (Last 24 hours) at 3/1/2021 0851  Last data filed at 3/1/2021 0358  Gross per 24 hour   Intake 490 ml   Output --   Net 490 ml       Physical Exam Performed:    BP (!) 137/94   Pulse 72   Temp 98.5 °F (36.9 °C) (Oral)   Resp 16   SpO2 99%     (Deferred - patient in OR)    Labs:   Recent Labs     02/27/21  0611   WBC 6.7   HGB 14.5   HCT 45.0        Recent Labs     02/27/21  0611      K 4.1      CO2 26   BUN 13   CREATININE 0.7   CALCIUM 9.4     No results for input(s): AST, ALT, BILIDIR, BILITOT, ALKPHOS in the last 72 hours. No results for input(s): INR in the last 72 hours. No results for input(s): Wayna Khat in the last 72 hours. Urinalysis:    No results found for: Jerolyn Cowboy, BACTERIA, RBCUA, BLOODU, SPECGRAV, [address]    Radiology:  XR HIP RIGHT (1 VIEW)   Final Result      1. Right hip arthroplasty without evidence of dalila-implant fracture or  dislocation. Assessment/Plan:    Acute hip dislocation status post procedure: On as needed pain meds  Patient is in surgery today  RCRI score 0, 3.9% risk of major cardiac event  Low risk for surgery  Management per orthopedic    Throat pain:  Likely secondary to endotracheal tube  Phenol spray as needed noted     Hypertensive urgency/HTN:   Likely was secondary to not being on home medication and being in pain   Monitor   Continue metoprolol and lisinopril/hydrochlorothiazide    Hyperglycemia:   Hemoglobin A1c 5.1  Monitor blood glucose  Continue carb controlled diet    Hyperlipidemia:   Continue statin     Hypothyroidism:   Continue Synthroid  Outpatient follow-up with TSH    Class II obesity:   Complicating assessment and treatment. Placing patient at risk for multiple co-morbidities as well as early death and contributing to the patient's presentation.  Education, and counseling  Recommended outpatient polysomnography and follow-up PCP for sleep apnea evaluation    DVT Prophylaxis: SCDs, per Ortho  Diet: Diet NPO, After Midnight  Code Status: Full Code    PT/OT Eval Status: Per orthopedic surgery    Dispo -pending surgery on Monday, per orthopedic surgery    Derrick Traylor DO

## 2021-03-01 NOTE — ANESTHESIA POSTPROCEDURE EVALUATION
Department of Anesthesiology  Postprocedure Note    Patient: Pinky Bolanos  MRN: [id number]  YOB: 1970  Date of evaluation: 3/1/2021  Time:  4:18 PM     Procedure Summary     Date: 03/01/21 Room / Location: [facility] [address] [place] [address] / [facility]    Anesthesia Start: 6376 Anesthesia Stop: 6064    Procedure: HIP TOTAL ARTHROPLASTY ANTERIOR APPROACH REVISION (Right ) Diagnosis: (Dislocated Total Hip - Right)    Surgeons: Prashanth Osorio MD Responsible Provider: Messi Jarquin MD    Anesthesia Type: general ASA Status: 3 - Emergent          Anesthesia Type: general    Gladys Phase I: Gladys Score: 9    Gladys Phase II:      Last vitals: Reviewed and per EMR flowsheets.        Anesthesia Post Evaluation    Patient location during evaluation: PACU  Patient participation: complete - patient participated  Level of consciousness: awake  Pain score: 2  Airway patency: patent  Nausea & Vomiting: no nausea and no vomiting  Complications: no  Cardiovascular status: blood pressure returned to baseline  Respiratory status: acceptable  Hydration status: euvolemic

## 2021-03-02 VITALS
DIASTOLIC BLOOD PRESSURE: 86 MMHG | OXYGEN SATURATION: 96 % | BODY MASS INDEX: 39.27 KG/M2 | HEART RATE: 75 BPM | HEIGHT: 64 IN | WEIGHT: 230 LBS | SYSTOLIC BLOOD PRESSURE: 144 MMHG | TEMPERATURE: 98.5 F | RESPIRATION RATE: 19 BRPM

## 2021-03-02 LAB
ANION GAP SERPL CALCULATED.3IONS-SCNC: 10 MMOL/L (ref 3–16)
BUN BLDV-MCNC: 15 MG/DL (ref 7–20)
CALCIUM SERPL-MCNC: 9.1 MG/DL (ref 8.3–10.6)
CHLORIDE BLD-SCNC: 99 MMOL/L (ref 99–110)
CO2: 27 MMOL/L (ref 21–32)
CREAT SERPL-MCNC: 0.7 MG/DL (ref 0.6–1.1)
GFR AFRICAN AMERICAN: >60
GFR NON-AFRICAN AMERICAN: >60
GLUCOSE BLD-MCNC: 124 MG/DL (ref 70–99)
HCT VFR BLD CALC: 38.8 % (ref 36–48)
HEMOGLOBIN: 12.8 G/DL (ref 12–16)
MCH RBC QN AUTO: 31.5 PG (ref 26–34)
MCHC RBC AUTO-ENTMCNC: 32.9 G/DL (ref 31–36)
MCV RBC AUTO: 95.7 FL (ref 80–100)
PDW BLD-RTO: 15.3 % (ref 12.4–15.4)
PLATELET # BLD: 337 K/UL (ref 135–450)
PMV BLD AUTO: 7.8 FL (ref 5–10.5)
POTASSIUM SERPL-SCNC: 4.9 MMOL/L (ref 3.5–5.1)
RBC # BLD: 4.06 M/UL (ref 4–5.2)
SODIUM BLD-SCNC: 136 MMOL/L (ref 136–145)
WBC # BLD: 11.9 K/UL (ref 4–11)

## 2021-03-02 PROCEDURE — 97161 PT EVAL LOW COMPLEX 20 MIN: CPT

## 2021-03-02 PROCEDURE — G0378 HOSPITAL OBSERVATION PER HR: HCPCS

## 2021-03-02 PROCEDURE — 97166 OT EVAL MOD COMPLEX 45 MIN: CPT

## 2021-03-02 PROCEDURE — 80048 BASIC METABOLIC PNL TOTAL CA: CPT

## 2021-03-02 PROCEDURE — 36415 COLL VENOUS BLD VENIPUNCTURE: CPT

## 2021-03-02 PROCEDURE — 2580000003 HC RX 258: Performed by: PHYSICIAN ASSISTANT

## 2021-03-02 PROCEDURE — 97116 GAIT TRAINING THERAPY: CPT

## 2021-03-02 PROCEDURE — 97530 THERAPEUTIC ACTIVITIES: CPT

## 2021-03-02 PROCEDURE — 6370000000 HC RX 637 (ALT 250 FOR IP): Performed by: PHYSICIAN ASSISTANT

## 2021-03-02 PROCEDURE — 97535 SELF CARE MNGMENT TRAINING: CPT

## 2021-03-02 PROCEDURE — 85027 COMPLETE CBC AUTOMATED: CPT

## 2021-03-02 RX ORDER — PREDNISONE 10 MG/1
10 TABLET ORAL DAILY
Qty: 10 TABLET | Refills: 0 | Status: SHIPPED | OUTPATIENT
Start: 2021-03-03 | End: 2021-03-13

## 2021-03-02 RX ORDER — POLYETHYLENE GLYCOL 3350 17 G/17G
17 POWDER, FOR SOLUTION ORAL DAILY
Status: DISCONTINUED | OUTPATIENT
Start: 2021-03-02 | End: 2021-03-02 | Stop reason: HOSPADM

## 2021-03-02 RX ORDER — OXYCODONE HYDROCHLORIDE 5 MG/1
5-10 TABLET ORAL EVERY 4 HOURS PRN
Qty: 28 TABLET | Refills: 0 | Status: SHIPPED | OUTPATIENT
Start: 2021-03-02 | End: 2021-03-05

## 2021-03-02 RX ADMIN — DOCUSATE SODIUM 50 MG AND SENNOSIDES 8.6 MG 1 TABLET: 8.6; 5 TABLET, FILM COATED ORAL at 08:16

## 2021-03-02 RX ADMIN — ACETAMINOPHEN 650 MG: 325 TABLET ORAL at 02:26

## 2021-03-02 RX ADMIN — GABAPENTIN 400 MG: 400 CAPSULE ORAL at 08:15

## 2021-03-02 RX ADMIN — Medication 10 ML: at 08:21

## 2021-03-02 RX ADMIN — DULOXETINE HYDROCHLORIDE 60 MG: 60 CAPSULE, DELAYED RELEASE ORAL at 08:15

## 2021-03-02 RX ADMIN — ACETAMINOPHEN 650 MG: 325 TABLET ORAL at 08:16

## 2021-03-02 RX ADMIN — ASPIRIN 325 MG: 325 TABLET, COATED ORAL at 08:17

## 2021-03-02 RX ADMIN — LISINOPRIL AND HYDROCHLOROTHIAZIDE 1 TABLET: 25; 20 TABLET ORAL at 08:17

## 2021-03-02 RX ADMIN — CETIRIZINE HYDROCHLORIDE 10 MG: 10 TABLET, FILM COATED ORAL at 08:16

## 2021-03-02 RX ADMIN — OXYCODONE 5 MG: 5 TABLET ORAL at 00:03

## 2021-03-02 RX ADMIN — METRONIDAZOLE 500 MG: 500 TABLET ORAL at 08:17

## 2021-03-02 RX ADMIN — ACYCLOVIR 800 MG: 200 CAPSULE ORAL at 08:16

## 2021-03-02 RX ADMIN — OXYCODONE 10 MG: 5 TABLET ORAL at 08:17

## 2021-03-02 RX ADMIN — CYANOCOBALAMIN TAB 1000 MCG 1000 MCG: 1000 TAB at 08:16

## 2021-03-02 RX ADMIN — PANTOPRAZOLE SODIUM 40 MG: 40 TABLET, DELAYED RELEASE ORAL at 06:44

## 2021-03-02 RX ADMIN — PREDNISONE 10 MG: 10 TABLET ORAL at 08:16

## 2021-03-02 ASSESSMENT — PAIN DESCRIPTION - DESCRIPTORS
DESCRIPTORS: ACHING
DESCRIPTORS: ACHING

## 2021-03-02 ASSESSMENT — PAIN DESCRIPTION - ONSET
ONSET: ON-GOING
ONSET: ON-GOING

## 2021-03-02 ASSESSMENT — PAIN DESCRIPTION - PAIN TYPE
TYPE: SURGICAL PAIN
TYPE: SURGICAL PAIN

## 2021-03-02 ASSESSMENT — PAIN - FUNCTIONAL ASSESSMENT
PAIN_FUNCTIONAL_ASSESSMENT: PREVENTS OR INTERFERES SOME ACTIVE ACTIVITIES AND ADLS
PAIN_FUNCTIONAL_ASSESSMENT: PREVENTS OR INTERFERES SOME ACTIVE ACTIVITIES AND ADLS

## 2021-03-02 ASSESSMENT — PAIN DESCRIPTION - ORIENTATION
ORIENTATION: RIGHT
ORIENTATION: RIGHT

## 2021-03-02 ASSESSMENT — PAIN SCALES - GENERAL
PAINLEVEL_OUTOF10: 7
PAINLEVEL_OUTOF10: 2
PAINLEVEL_OUTOF10: 7
PAINLEVEL_OUTOF10: 4
PAINLEVEL_OUTOF10: 4

## 2021-03-02 ASSESSMENT — PAIN DESCRIPTION - LOCATION
LOCATION: GROIN
LOCATION: GROIN

## 2021-03-02 ASSESSMENT — PAIN DESCRIPTION - FREQUENCY
FREQUENCY: CONTINUOUS
FREQUENCY: CONTINUOUS

## 2021-03-02 NOTE — OP NOTE
[address], [place] [address]fer                                OPERATIVE REPORT    PATIENT NAME: Dionicio Coffey                   :        1970  MED REC NO:   [id number]                          ROOM:       5163  ACCOUNT NO:   [de-identified]                           ADMIT DATE: 2021  PROVIDER:     Jessa Anders MD    DATE OF PROCEDURE:  2021    PREOPERATIVE DIAGNOSIS:  Right hip dislocation. POSTOPERATIVE DIAGNOSIS:  Right hip dislocation. SURGEON:  Jessa Anders MD    ASSISTANT SURGEON:  Yaima Echevraria PA-C    ANESTHESIA:  General endotracheal.  EBL:400    OPERATION PERFORMED:  Revision of right total hip arthroplasty in the  setting of morbid obesity and posterior dislocation. INDICATIONS:  The patient is a 48-year-old who had a minimal  trauma-related posterior hip dislocation and she was brought in through  the emergency room over the weekend and had relocation of her hip. Despite multiple attempts of trying to do this in the emergency room,  she was able to be given general anesthesia and reduction was obtained. Because of the difficulty obtaining reduction and her sensation of  previous slipping of the hip, she requested surgical intervention  through revision hip arthroplasty. Risks and benefits were explained  including continued dislocation, wound, and anesthesia-related  complications. Significant complications related to morbid obesity with  BMI greater than 40. The patient consented to surgical intervention of  the right hip. OPERATIVE PROCEDURE:  She was brought to the operating room in stable  condition, underwent induction of general anesthesia and insertion of  airway. She was placed in the supine position. Right leg was prepped  with ChloraPrep scrub, draped in a sterile fashion. Betadine  Steri-Drape was applied over the leg.   Perioperative antibiotics and tranexamic acid were utilized. Previous incision was identified and  extended proximally and distally. This incision was extended after  time-out to subcutaneous tissues and then to fascia. Fascial incision  with Ethibond#s was identified and incised with underlying tensor  identified. This tensor was dissected from the overlying fascia and  reflected laterally. Medial to the tensor, incision was made directly  into muscle tissues where other Ethibond's were identified indicating  noted scar from previous surgical intervention. Anterior capsular  closure was identified as well extending up into the anterior femoral  head and neck. Once this scar and capsule were removed, femoral head  was easily identified. It was very well located. There was no anterior  dislocation but with flexion and internal rotation, the hip promptly  dislocated and then relocated again. The polyethylene which had then  moved anterosuperiorly appeared to be wedging the prosthesis of the  back, so attempt was made to at that point place retractors around the  acetabulum. Hip was dislocated. Head was removed. Polyethylene was  removed. An attempt was made to place the tripolar prosthesis and  because of the small nature of the acetabulum and the limited options  for the dual mobility hip, a +8 mm dual mobility hip had excellent  control and excellent stability. However, because of the lack of FDA  regulations along it, this was not able to be applied to her and had  very little bite on the end of the hip. Therefore, it was rejected as  an option. New polyethylene was inserted again in a posteroinferior  position. Additional length was placed onto the stem +8, reduction  obtained and excellent alignment was obtained with good stability even  at 100 degrees of flexion and internal rotation to 60 degrees. This was  maintained as an ideal position. Irrigation was done.   Deep fascia was

## 2021-03-02 NOTE — PROGRESS NOTES
Up in heredia and room , gait steady walker and gait belt  Working with pt ot dr in to see , probable dc home today, wound vac in place , and activated , pt aware of hip precautions ,

## 2021-03-02 NOTE — PROGRESS NOTES
Physical Therapy    Facility/Department: [facility]  Initial Assessment and Treatment    NAME: Chandrakant Germain  : 1970  MRN: [id number]    Date of Service: 3/2/2021    Discharge Recommendations:    Chandrakant Germain scored a 21/24 on the AM-PAC short mobility form. Current research shows that an AM-PAC score of 18 or greater is typically associated with a discharge to the patient's home setting. Based on the patient's AM-PAC score and their current functional mobility deficits, it is recommended that the patient have 2-3 sessions per week of Physical Therapy at d/c to increase the patient's independence. At this time, this patient demonstrates the endurance and safety to discharge home with home PT and a follow up treatment frequency of 2-3x/wk. Please see assessment section for further patient specific details. If patient discharges prior to next session this note will serve as a discharge summary. Please see below for the latest assessment towards goals. PT Equipment Recommendations  Equipment Needed: No    Assessment   Body structures, Functions, Activity limitations: Decreased functional mobility   Assessment: Pt with slightly decreased independent mobility from baseline s/p HIP TOTAL ARTHROPLASTY ANTERIOR APPROACH REVISION. Pt normally independent with mobility without AD. Currently needing SB/supervision with rolling walker. Should progress well. Plans to return home with  and has no concerns about managing. Will follow while in hospital to maximize mobility and independence  Treatment Diagnosis: impaired functional mobility s/p HIP TOTAL ARTHROPLASTY ANTERIOR APPROACH REVISION  Decision Making: Low Complexity  PT Education: Goals;PT Role;Plan of Care;Precautions;General Safety; Functional Mobility Training  Patient Education: Pt verbalized understanding  REQUIRES PT FOLLOW UP: Yes       Patient Diagnosis(es): There were no encounter diagnoses.      has a past medical history of Arthritis, Hyperlipidemia, Hypertension, and Thyroid disease. has a past surgical history that includes Foot surgery (Bilateral); Tympanostomy tube placement (Bilateral); Colonoscopy; other surgical history; Total hip arthroplasty (Right, 8/10/2020); Revision total hip arthroplasty (Right, 2/26/2021); and Total hip arthroplasty (Right, 3/1/2021). Restrictions  Position Activity Restriction  Other position/activity restrictions: full weight bearing as tolerated, surgeon gave verbal order for posterior precautions despite anterior approach  Vision/Hearing  Vision: Within Functional Limits(readers)  Hearing: Within functional limits     Subjective  General  Chart Reviewed: Yes  Additional Pertinent Hx: Pt is a 45 y.o. female adm 2/26 with R hip dislocation. Pt status post right hip replacement 8/10/2020 presented to the ED complaining of right hip dislocation. Patient was in the shower and bent over to dry herself off when she felt her right hip pop. Xray R hip:Superior dislocation of the femoral component of a right hip prosthesis. Unsuccessful conscious sedation and attempted closed reduction of right hip dislocation in ED. Pt s/p RIGHT HIP CLOSED REDUCTION under anesthesia on 2/26. Pt s/p HIP TOTAL ARTHROPLASTY ANTERIOR APPROACH REVISION on 3/1. PMH: arthritis, hyperlipidemia, HTN, bilat foot surgeries, R THR 8/2020  Referring Practitioner: ERICA Negron  Referral Date : 03/01/21  Subjective  Subjective: Pt found sitting in chair. Agreeable to PT. Wants to go home.   Pain Screening  Patient Currently in Pain: Denies  Vital Signs  Patient Currently in Pain: Denies       Orientation  Orientation  Overall Orientation Status: Within Functional Limits  Social/Functional History  Social/Functional History  Lives With: Spouse  Type of Home: House  Home Layout: Two level, Bed/Bath upstairs, 1/2 bath on main level  Home Access: Stairs to enter without rails  Entrance Stairs - Number of Steps: a couple AMAURY through garage door  Bathroom Toilet: Handicap height  Bathroom Equipment: Shower chair  Home Equipment: Crutches, Rolling walker  ADL Assistance: Independent  Homemaking Assistance: Independent( can assist)  Ambulation Assistance: Independent(without AD)  Transfer Assistance: Independent  Active : Yes  Occupation: Full time employment  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 Spouse will be with her at home this week. Kids live close by as well  Cognition        Objective          AROM RLE (degrees)  RLE AROM: WFL  AROM LLE (degrees)  LLE AROM : WFL  Strength RLE  Strength RLE: WFL  Strength LLE  Strength LLE: WFL           Transfers  Sit to Stand: Stand by assistance  Stand to sit: Stand by assistance  Ambulation  Ambulation?: Yes  Ambulation 1  Device: Rolling Walker  Assistance: Supervision  Quality of Gait: steady with walker, good grayson  Distance: 150'  Comments: Reviewed hip precautions with ambulation/turning as well as when sitting. Pt verbalized understanding. Stairs/Curb  Stairs?: (Declined to practice steps.   \"I've had several foot surgies\"  Pt verbalized understanding of technique)            Treatment included: bed mobility, transfers, gt, pt education    Plan   Plan  Times per week: 7  Times per day: Twice a day  Current Treatment Recommendations: Functional Mobility Training, Gait Training, Stair training, Patient/Caregiver Education & Training, Safety Education & Training  Safety Devices  Type of devices: Call light within reach, Chair alarm in place, Nurse notified, Left in chair    G-Code       OutComes Score                                                  AM-PAC Score  AM-PAC Inpatient Mobility Raw Score : 21 (03/02/21 1051)  AM-PAC Inpatient T-Scale Score : 50.25 (03/02/21 1051)  Mobility Inpatient CMS 0-100% Score: 28.97 (03/02/21 1051)  Mobility Inpatient CMS G-Code Modifier : Richardson Vazquez (03/02/21 1051)          Goals  Short term goals  Time Frame for Short term goals: By discharge  Short term goal 1: Sit to stand independent  Short term goal 2: Pt will amb >200' with RW independent  Short term goal 3: Pt will up/down 2-4 steps with LRAD supervision       Therapy Time   Individual Concurrent Group Co-treatment   Time In 1010         Time Out 1033         Minutes 23            Timed Code Treatment Minutes: 8      Total Treatment Minutes:  [address], PT

## 2021-03-02 NOTE — PLAN OF CARE
Problem: Falls - Risk of:  Goal: Will remain free from falls  Description: Will remain free from falls  Outcome: Ongoing  Note: Fall precautions in place. Bed is in lowest position, wheels locked and alarm on. Non-skid socks on. Call light and bedside table within reach. Pt calls out appropriately. Pt is up x 1 assist with a walker and GB. The patient is full weight bearing to the RLE. Will continue to assess and monitor. Problem: Pain:  Goal: Pain level will decrease  Description: Pain level will decrease  Outcome: Ongoing  Note: Patient uses 0-10 pain scale. Patient is rating her pain a 2 out of 10 on the pain scale. She notes the pain is an ache located in the right hip and radiates to the right groin. Requests oral oxycodone PRN for pain along with scheduled Tylenol. Patient uses ice therapy and an abductor pillow for increased comfort. Will continue to monitor and assess.

## 2021-03-02 NOTE — DISCHARGE INSTR - COC
Continuity of Care Form    Patient Name: Haley Mclaughlin   :  1970  MRN:  [id number]    Admit date:  2021  Discharge date:  ***    Code Status Order: Full Code   Advance Directives:   Advance Care Flowsheet Documentation     Date/Time Healthcare Directive Type of Healthcare Directive Copy in [address] Agent's Name Healthcare Agent's Phone Number    21 0825  No, patient does not have an advance directive for healthcare treatment -- -- -- -- --    21 9419  No, patient does not have an advance directive for healthcare treatment -- -- -- -- --          Admitting Physician:  Jarocho Anderson MD  PCP: Rosi Higgins    Discharging Nurse: [facility] Unit/Room#: 1070/7110-34  Discharging Unit Phone Number: ***    Emergency Contact:   Extended Emergency Contact Information  Primary Emergency Contact: [place]  Address: [address] [place] [address]  Home Phone: [phone]  Work Phone: [phone]  Mobile Phone: [phone]  Relation: Spouse    Past Surgical History:  Past Surgical History:   Procedure Laterality Date    COLONOSCOPY      FOOT SURGERY Bilateral     3 on each foot    OTHER SURGICAL HISTORY      Spinal stimulator left hip    REVISION TOTAL HIP ARTHROPLASTY Right 2021    RIGHT HIP CLOSED REDUCTION performed by Jarocho Anderson MD at [address] [place] [address] Right 8/10/2020    RIGHT TOTAL HIP ARTHROPLASTY ANTERIOR APPROACH performed by Jarocho Anderson MD at [address] [place] [address] Right 3/1/2021    HIP TOTAL ARTHROPLASTY ANTERIOR APPROACH REVISION performed by Jarocho Anderson MD at [place] [address] Bilateral        Immunization History: There is no immunization history on file for this patient.     Active Problems:  Patient Active Problem List   Diagnosis Code    Primary osteoarthritis of right hip M16.11    Status post total hip replacement, right Z96.641    Hypertension I10    Hyperlipidemia E78.5    Thyroid disease E07.9    Right hip pain M25.551    Hip dislocation, right, sequela S73.004S    Morbid obesity with body mass index (BMI) of 40.0 or higher ([facility]) E66.01       Isolation/Infection:   Isolation          No Isolation        Patient Infection Status     None to display          Nurse Assessment:  Last Vital Signs: BP (!) 144/86   Pulse 75   Temp 98.5 °F (36.9 °C) (Oral)   Resp 19   Ht 5' 4\" (1.626 m)   Wt 230 lb (104.3 kg)   SpO2 96%   BMI 39.48 kg/m²     Last documented pain score (0-10 scale): Pain Level: 2  Last Weight:   Wt Readings from Last 1 Encounters:   03/01/21 230 lb (104.3 kg)     Mental Status:  {IP PT MENTAL STATUS:20030}    IV Access:  { GAB IV ACCESS:773369615}    Nursing Mobility/ADLs:  Walking   {P DME BYFD:715332373}  Transfer  {P DME JVOL:027875979}  Bathing  {P DME DWPR:358753530}  Dressing  {CHP DME BRJH:398641765}  Toileting  {P DME MYVQ:611581457}  Feeding  {P DME IDJX:077305733}  Med Admin  {P DME ECFU:562256143}  Med Delivery   { GAB MED Delivery:281357400}    Wound Care Documentation and Therapy:  Negative Pressure Wound Therapy Hip Anterior;Right (Active)   Wound Type Surgical 03/02/21 0754   Unit Type Prevena 125 03/02/21 0754   Dressing Type Other (Comment) 03/02/21 0754   Cycle Continuous 03/02/21 0754   Target Pressure (mmHg) 125 03/02/21 0754   Canister changed? No 03/02/21 0754   Dressing Status Clean;Dry; Intact 03/02/21 0754   Drainage Amount None 03/02/21 0754   Output (ml) 0 ml 03/02/21 0754   Wound Assessment Other (Comment) 03/02/21 0754   Nasreen-wound Assessment Clean;Dry; Intact 03/02/21 0754   Number of days: 0        Elimination:  Continence:   · Bowel: {YES / CO:42893}  · Bladder: {YES / LI:65359}  Urinary Catheter: {Urinary Catheter:706062612}   Colostomy/Ileostomy/Ileal Conduit: {YES / XZ:00316}       Date of Last BM: ***    Intake/Output Summary (Last 24 hours) at 3/2/2021 1146  Last data filed at 3/2/2021 0910  Gross per 24 hour   Intake 2595.22 ml   Output 1550 ml   Net 1045.22 ml     I/O last 3 completed shifts:   In: 2355.2 [P.O.:780; I.V.:1575.2]  Out: 1850 [Urine:1850]    Safety Concerns:     [address] [facility] Safety Concerns:393453978}    Impairments/Disabilities:      [address] [place] [address] Impairments/Disabilities:781963055}    Nutrition Therapy:  Current Nutrition Therapy:   [facility] Diet List:673391770}    Routes of Feeding: {CHP DME Other Feedings:303142686}  Liquids: {Slp liquid thickness:54579}  Daily Fluid Restriction: {CHP DME Yes amt example:134132384}  Last Modified Barium Swallow with Video (Video Swallowing Test): {Done Not Done KIP:294450479}    Treatments at the Time of Hospital Discharge:   Respiratory Treatments: ***  Oxygen Therapy:  {Therapy; copd oxygen:08835}  Ventilator:    [address] [place] [address] Vent ERAK:675797266}    Rehab Therapies: Physical Therapy, Occupational Therapy and Skilled Nurse  Weight Bearing Status/Restrictions: [address] [place] [address] Weight Bearin}  Other Medical Equipment (for information only, NOT a DME order):  {EQUIPMENT:229750376}  Other Treatments: ***    Patient's personal belongings (please select all that are sent with patient):  {CHP DME Belongings:440918141}    RN SIGNATURE:  {Esignature:097641436}    CASE MANAGEMENT/SOCIAL WORK SECTION    Inpatient Status Date: 2021    Readmission Risk Assessment Score:  Readmission Risk              Risk of Unplanned Readmission:        8           Discharging to Facility/ [facility]            [address] [place] [address] Arnaldo Hernández [address]       Phone: [phone]       Fax: [phone]          / signature: Electronically signed by Anuel Walters RN on 3/2/21 at 1:28 PM EST    PHYSICIAN SECTION    Prognosis: {Prognosis:8846403146}    Condition at Discharge: [facility] Patient Condition:928660184}    Rehab Potential (if transferring to Rehab): {Prognosis:7295220851}    Recommended Labs or Other Treatments After Discharge: ***    Physician Certification: I certify the above information and transfer of Lynne Petty  is necessary for the continuing treatment of the diagnosis listed and that she requires {Admit to Appropriate Level of Care:83866} for {GREATER/LESS:899739432} 30 days.      Update Admission H&P: {CHP DME Changes in ZOPLU:455670240}    PHYSICIAN SIGNATURE:  {Esignature:385504536}

## 2021-03-02 NOTE — DISCHARGE SUMMARY
Department of Orthopedic Surgery  Nurse Practitioner   Discharge Summary      The Kamila Velasquez is a 45 y.o. female underwent total hip replacement revision procedure without complication. Kamila Velasquez was admitted to the floor following their recovery in the PACU.      Discharge Diagnosis  right Hip Replacement Revision    Current Inpatient Medications    Current Facility-Administered Medications: polyethylene glycol (GLYCOLAX) packet 17 g, 17 g, Oral, Daily  predniSONE (DELTASONE) tablet 10 mg, 10 mg, Oral, Daily  tiZANidine (ZANAFLEX) tablet 4 mg, 4 mg, Oral, Q6H PRN  oxyCODONE (ROXICODONE) immediate release tablet 5 mg, 5 mg, Oral, Q4H PRN **OR** oxyCODONE (ROXICODONE) immediate release tablet 10 mg, 10 mg, Oral, Q4H PRN  HYDROmorphone (DILAUDID) injection 0.25 mg, 0.25 mg, Intravenous, Q3H PRN **OR** HYDROmorphone (DILAUDID) injection 0.5 mg, 0.5 mg, Intravenous, Q3H PRN  aspirin EC tablet 325 mg, 325 mg, Oral, BID  phenol 1.4 % mouth spray 1 spray, 1 spray, Mouth/Throat, Q2H PRN  acyclovir (ZOVIRAX) capsule 800 mg, 800 mg, Oral, Daily  bisacodyl (DULCOLAX) EC tablet 5 mg, 5 mg, Oral, BID PRN  cetirizine (ZYRTEC) tablet 10 mg, 10 mg, Oral, Daily  polyvinyl alcohol (LIQUIFILM TEARS) 1.4 % ophthalmic solution 2 drop, 2 drop, Both Eyes, Q4H PRN  DULoxetine (CYMBALTA) extended release capsule 60 mg, 60 mg, Oral, Daily  gabapentin (NEURONTIN) capsule 400 mg, 400 mg, Oral, TID  lisinopril-hydroCHLOROthiazide (PRINZIDE;ZESTORETIC) 20-25 MG per tablet 1 tablet, 1 tablet, Oral, Daily  metoprolol succinate (TOPROL XL) extended release tablet 25 mg, 25 mg, Oral, Nightly  metroNIDAZOLE (FLAGYL) tablet 500 mg, 500 mg, Oral, BID  pantoprazole (PROTONIX) tablet 40 mg, 40 mg, Oral, QAM AC  vitamin B-12 (CYANOCOBALAMIN) tablet 1,000 mcg, 1,000 mcg, Oral, Daily  sodium chloride flush 0.9 % injection 10 mL, 10 mL, Intravenous, 2 times per day  sodium chloride flush 0.9 % injection 10 mL, 10 mL, Intravenous, PRN  acetaminophen (TYLENOL) tablet 650 mg, 650 mg, Oral, Q6H  sennosides-docusate sodium (SENOKOT-S) 8.6-50 MG tablet 1 tablet, 1 tablet, Oral, BID  magnesium hydroxide (MILK OF MAGNESIA) 400 MG/5ML suspension 30 mL, 30 mL, Oral, Daily PRN  0.45 % sodium chloride infusion, , Intravenous, Continuous  promethazine (PHENERGAN) tablet 12.5 mg, 12.5 mg, Oral, Q6H PRN **OR** ondansetron (ZOFRAN) injection 4 mg, 4 mg, Intravenous, Q6H PRN  labetalol (NORMODYNE;TRANDATE) injection 10 mg, 10 mg, Intravenous, Q4H PRN  melatonin disintegrating tablet 5 mg, 5 mg, Oral, Nightly    Post-operatively the patients diet was advanced as tolerated and their incision was checked on POD #1. The incision is dressing in place, clean, dry and intact with no signs of infection. The patient remained neurovascularly intact in the lower extremity and had intact pulses distally. Patients calf remained soft and showed no evidence of DVT. The patient was able to move their leg and ankle/foot without any problems post-operatively. Physical therapy and occupational therapy were consulted and began working with the patient post-operatively. The patient progressed with PT/OT as would be expected and continued to improve through their stay. The patients pain was initially controlled with IV medications but we were able to transition to oral pain medications soon after arrival to the floor and their pain remained under good control through their hospital stay. From a medical standpoint the patient remained stable and continued to have the medicine team follow throughout their stay. The patients dressing was changed/incision was checked on day of d/c. The patient will be discharged at this time to Home  with their current diet restrictions and will continue to follow the hip precautions outlined to them by us and PT/OT. Condition on Discharge: Stable    Plan  Return visit in 2 weeks. .  Patient was instructed on the use of pain medications, the signs and symptoms of infection, and was given our number to call should they have any questions or concerns following discharge. For opioid prescriptions given at discharge the following statement is provided for compliance with [employer] rules. Patient being given increased dosage/quantity of opoid pain medication in excess of [employer] guidelines which noted a 30 MED daily of opioids due to the fact that he/she has undergone major orthopaedic surgery as outlined in rule 4731-11-13. Dosages and further duration of the pain medication will be re-evaluated at her post op visit in 2 weeks. Patient was educated on dosing expectations and limits of prescribing as a result of the new [facility] Board rules enacted August 31, 2017. Please also note that this is not the initial opoid prescription issued to this patient but a continuation of medication utilized during the hospital admission as noted in the medical record. [place]RS report has also been utilized to screen for any abuse history or suspicious activity as outlined in [place]. All efforts have been taken to prevent abuse potential and misuse of opioid medications including education, screening, and close clinical follow up.

## 2021-03-02 NOTE — PROGRESS NOTES
Hospitalist Progress Note      PCP: Gabriela Dacosta    Date of Admission: 2/26/2021    Chief Complaint: Right hip pain    Hospital Course: Admitted for right hip pain secondary to dislocation of hip arthroplasty. Status post total hip arthroplasty relocation. Plan for revision surgery on Monday. We are consulted for medical management of comorbidities. Subjective: Patient in OR.     Medications:  Reviewed    Infusion Medications    sodium chloride       Scheduled Medications    polyethylene glycol  17 g Oral Daily    predniSONE  10 mg Oral Daily    aspirin  325 mg Oral BID    acyclovir  800 mg Oral Daily    cetirizine  10 mg Oral Daily    DULoxetine  60 mg Oral Daily    gabapentin  400 mg Oral TID    lisinopril-hydroCHLOROthiazide  1 tablet Oral Daily    metoprolol succinate  25 mg Oral Nightly    metroNIDAZOLE  500 mg Oral BID    pantoprazole  40 mg Oral QAM AC    vitamin B-12  1,000 mcg Oral Daily    sodium chloride flush  10 mL Intravenous 2 times per day    acetaminophen  650 mg Oral Q6H    sennosides-docusate sodium  1 tablet Oral BID    melatonin  5 mg Oral Nightly     PRN Meds: tiZANidine, oxyCODONE **OR** oxyCODONE, HYDROmorphone **OR** HYDROmorphone, phenol, bisacodyl, polyvinyl alcohol, sodium chloride flush, magnesium hydroxide, promethazine **OR** ondansetron, labetalol      Intake/Output Summary (Last 24 hours) at 3/2/2021 0927  Last data filed at 3/2/2021 0910  Gross per 24 hour   Intake 2595.22 ml   Output 1850 ml   Net 745.22 ml       Physical Exam Performed:    BP (!) 144/91   Pulse 72   Temp 98.6 °F (37 °C) (Oral)   Resp 19   Ht 5' 4\" (1.626 m)   Wt 230 lb (104.3 kg)   SpO2 96%   BMI 39.48 kg/m²     (Deferred - patient in OR)    Labs:   Recent Labs     03/02/21  0556   WBC 11.9*   HGB 12.8   HCT 38.8        Recent Labs     03/02/21  0556      K 4.9   CL 99   CO2 27   BUN 15   CREATININE 0.7   CALCIUM 9.1 No results for input(s): AST, ALT, BILIDIR, BILITOT, ALKPHOS in the last 72 hours. No results for input(s): INR in the last 72 hours. No results for input(s): Austen Goddard in the last 72 hours. Urinalysis:    No results found for: Andrew Scarce, BACTERIA, RBCUA, BLOODU, SPECGRAV, [address] [place]    Radiology:  XR HIP 2-3 VW W PELVIS RIGHT   Final Result      As above. FLUORO FOR SURGICAL PROCEDURES   Final Result      As above. XR HIP RIGHT (1 VIEW)   Final Result      1. Right hip arthroplasty without evidence of dalila-implant fracture or  dislocation. Assessment/Plan:    Acute hip dislocation status post procedure: On as needed pain meds  Patient is in surgery today  RCRI score 0, 3.9% risk of major cardiac event  Low risk for surgery  Management per orthopedic    Throat pain:  Likely secondary to endotracheal tube  Phenol spray as needed noted     Hypertensive urgency/HTN:   Likely was secondary to not being on home medication and being in pain   Monitor   Continue metoprolol and lisinopril/hydrochlorothiazide    Hyperglycemia:   Hemoglobin A1c 5.1  Monitor blood glucose  Continue carb controlled diet    Hyperlipidemia:   Continue statin     Hypothyroidism:   Continue Synthroid  Outpatient follow-up with TSH    Class II obesity:   Complicating assessment and treatment. Placing patient at risk for multiple co-morbidities as well as early death and contributing to the patient's presentation.  Education, and counseling  Recommended outpatient polysomnography and follow-up PCP for sleep apnea evaluation    DVT Prophylaxis: SCDs, per Ortho  Diet: DIET GENERAL;  Code Status: Full Code    PT/OT Eval Status: Per orthopedic surgery    Dispo -pending surgery on Monday, per orthopedic surgery    Daniel Dvaid DO

## 2021-03-02 NOTE — CARE COORDINATION
[facility]    Referral received from  to follow for home care services. I will follow for needs, and speak with patient to verify demos.         Fabien Beasley  Work mobile: [phone]  [facility] office: [phone]

## 2021-03-02 NOTE — PROGRESS NOTES
Occupational Therapy   Occupational Therapy Initial Assessment/Tx/Discharge Note  Date: 3/2/2021   Patient Name: Ramakrishna Mercado  MRN: [id number]     : 1970    Date of Service: 3/2/2021    Discharge Recommendations: Ramakrishna Mercado scored a 21/24 on the AM-PAC ADL Inpatient form. At this time, no further OT is recommended upon discharge. Recommend patient returns to prior setting with initial 24 hr spvn. OT Equipment Recommendations  Equipment Needed: No    Assessment   Assessment: Pt is doing well POD #1. Demonstrates safe mobility with walker and little to no difficulty performing ADLs with AE as needed. Pt verbalizes understanding of hip precautions, needs occasional cueing, reports  is good about reminding her. Recommend initial 24 hr spvn. Decision Making: Medium Complexity  No Skilled OT: No OT goals identified; Safe to return home  REQUIRES OT FOLLOW UP: No  Activity Tolerance  Activity Tolerance: Patient Tolerated treatment well  Safety Devices  Safety Devices in place: Yes  Type of devices: Nurse notified; Left in chair;Call light within reach; Chair alarm in place                Restrictions  Position Activity Restriction  Other position/activity restrictions: full weight bearing as tolerated, surgeon gave verbal order for posterior precautions despite anterior approach    Subjective   General  Chart Reviewed: Yes  Additional Pertinent Hx: 45 y.o. F admitted  with R hip dislocation, closed reduction performed then went to OR for revision R KYREE 3/1. PMHx includes B foot surgeries, R KYREE 2020  Family / Caregiver Present: No  Referring Practitioner: Elizabeth  Subjective  Subjective: Pt in bed on entry. Pleasant and cooperative with therapy.   Pain Assessment  Pain Assessment: 0-10(no pain at rest, controlled surgical pain during activity, ice packs applied after sesion)  Social/Functional History  Social/Functional History  Lives With: Spouse  Type of Home: House  Home Layout: Two level, Bed/Bath upstairs, 1/2 bath on main level  Home Access: Stairs to enter without rails  Entrance Stairs - Number of Steps: a couple AMAURY through garage door  Bathroom Shower/Tub: Walk-in shower  Bathroom Toilet: Handicap height  Bathroom Equipment: Shower chair  Home Equipment: Crutches, Rolling walker, Reacher, Sock aid, Long-handled shoehorn  ADL Assistance: Independent  Homemaking Assistance: Independent( can assist)  Ambulation Assistance: Independent(without AD)  Transfer Assistance: Independent  Active : Yes  Occupation: Full time employment  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 Spouse will be with her at home this week. Kids live close by as well       Objective        Orientation  Overall Orientation Status: Within Functional Limits     Balance  Sitting Balance: Independent  Standing Balance: Stand by assistance  Standing Balance  Time: 1 min + 5 min  Activity: functional mobility in room, bathroom for ADLs with rolling walker  Comment: Spvn static stance, SBA transfers and gait. Toilet Transfers  Toilet - Technique: Ambulating  Equipment Used: Standard toilet  Toilet Transfer: Stand by assistance  Toilet Transfers Comments: demonstrated compliance with post precautions to standard toilet; has elevated commodes at home  ADL  Feeding: Independent  Grooming: Independent  LE Dressing: Stand by assistance;Verbal cueing;Setup(donned underwear with reacher; educated on sock aide, verb understanding, wears slip on shoes typically)  Toileting: Stand by assistance  Additional Comments: Educated on sponge bathing vs. showering initially pending doctors orders. Encouraged to perform bathing seated, use long handled sponge and modify techniques to comply with hip precautions, especially given pt dislocated while bending over to dry lower legs.  Pt educated on posterior precautions - verb understanding, needed occasional cues during session for compliance.         Bed mobility  Supine to Sit: Supervision(HOB raised, cues for no RLE adduction/inward rotation)  Transfers  Sit to stand: Stand by assistance  Stand to sit: Stand by assistance     Cognition  Overall Cognitive Status: WFL           Plan  - D/C OT services         AM-PAC Score  AM-PAC Inpatient Daily Activity Raw Score: 21 (03/02/21 0919)  AM-PAC Inpatient ADL T-Scale Score : 44.27 (03/02/21 0919)  ADL Inpatient CMS 0-100% Score: 32.79 (03/02/21 0919)  ADL Inpatient CMS G-Code Modifier : CJ (03/02/21 0919)      Therapy Time   Individual Concurrent Group Co-treatment   Time In 0825         Time Out 0918         Minutes 53          Timed Code Tx Min: 38  Total Tx Time: 270 [facility]

## 2021-03-02 NOTE — CARE COORDINATION
Patient here from home with . S/p R hip. Prevena vac intact. PT/OT pending. CM will continue to follow for discharge needs.   Tim Steele RN  RN Case Manager  [phone]

## 2021-03-02 NOTE — CARE COORDINATION
Completed: Yes    Notification completed in HENS/PAS?:  Not Applicable    IMM Completed:   Not Indicated    Transportation:  Transportation PLAN for discharge: family   Mode of Transport: Private Car  Reason for medical transport: Not Applicable  Name of Jones Jay,KIARRA [address]: Not Applicable  Time of Transport: when available    Transport form completed: Not Indicated    Home Care:  [address] ordered at discharge: Yes  [address]: [place] Sain[place]  Phone: [phone]  Fax: [phone]  Orders faxed: Yes    Durable Medical Equipment:  DME Provider: NA  Equipment obtained during hospitalization: NA    Home Oxygen and Respiratory Equipment:  Oxygen needed at discharge?: Not [address]: Not Applicable  Portable tank available for discharge?: Not Indicated    Dialysis:  Dialysis patient: No    Dialysis Center:  Not Applicable    Hospice Services:  Location: Not Applicable  Agency: Not Applicable    Consents signed: Not Indicated    Referrals made at [facility] for outpatient continued care:  Not Applicable    Additional CM Notes:   CM spoke to patient at bedside. Patient discharging to home with [facility]HC/[facility]. Patient stated a family member will be picking her up when ready. No DME needed. Has a RW in the home. Patient denied any questions or concerns regarding discharge plans.     [address] [address]       Phone: [phone]       Fax: [phone]          these medications [facility][id number]- [facility] [phone] - F [phone]  1 aspirin  2 oxyCODONE  3 predniSONE      The Plan for Transition of Care is related to the following treatment goals of RIGHT HIP DISLOCATION    The Patient and/or patient representative Kenny Reyna and her family were provided with a choice of provider and agrees with the discharge plan Yes    Freedom of choice list was provided with basic dialogue that supports the patient's individualized plan of care/goals and shares the quality data associated with the providers.  Yes    Care Transitions patient: No    Lindi Goltz, RN  [facility]  Case Management Department  Ph: [phone]  Fax: [phone]

## 2021-03-02 NOTE — CARE COORDINATION
[facility] order noted, all docs needed have been faxed to [facility] for home care services.         .  Fabien Vancehan  Work mobile: [phone]  [facility] office: [phone]

## 2021-03-02 NOTE — PROGRESS NOTES
Department of Orthopedic Surgery  Nurse Practitioner   Progress Note    Subjective:     Post-Operative Day: 1 Status Post right Total Hip Revision  Systemic or Specific Complaints: No complaints. Patient sitting up in the chair. Pain controlled. Nursing student at bedside. Objective:     Patient Vitals for the past 24 hrs:   BP Temp Temp src Pulse Resp SpO2   03/02/21 1100 (!) 144/86 98.5 °F (36.9 °C) Oral 75 -- 96 %   03/02/21 0641 (!) 144/91 98.6 °F (37 °C) Oral 72 19 96 %   03/02/21 0220 (!) 151/83 98.6 °F (37 °C) Oral 78 18 97 %   03/01/21 2342 (!) 149/96 97.7 °F (36.5 °C) Oral 90 17 96 %   03/01/21 1745 (!) 144/87 -- -- 108 -- --   03/01/21 1615 (!) 142/95 97.8 °F (36.6 °C) Oral 92 16 97 %   03/01/21 1555 (!) 150/90 97.2 °F (36.2 °C) -- 74 14 97 %   03/01/21 1545 (!) 153/93 -- -- 78 18 98 %   03/01/21 1500 (!) 131/97 -- -- 92 17 100 %   03/01/21 1450 (!) 151/99 -- -- 91 8 99 %   03/01/21 1445 (!) 141/100 -- -- 91 12 99 %   03/01/21 1440 (!) 151/101 -- -- 90 8 98 %   03/01/21 1436 (!) 149/102 97.5 °F (36.4 °C) Temporal 93 12 97 %   03/01/21 1435 (!) 149/102 -- -- -- -- 97 %       General: alert, appears stated age, cooperative and no distress   Wound: Wound clean and dry no evidence of infection. Motion: Painful range of Motion   DVT Exam: No evidence of DVT seen on physical exam.       NVI in lower extremity. Thigh swollen but soft. Moving foot and ankle. Data Review  CBC:   Lab Results   Component Value Date    WBC 11.9 03/02/2021    RBC 4.06 03/02/2021    HGB 12.8 03/02/2021    HCT 38.8 03/02/2021     03/02/2021       Assessment:     Status Post right Total Hip Revision. Doing well postoperatively. Plan:      1: Discharge today, Return to Clinic: 2 weeks : Post-op labs reviewed and stable. Medicine following. 2:  Continue Deep venous thrombosis prophylaxis - asa 325 mg BID  3:  Continue physical therapy, OT, WBAT.  Posterior hip precautions  4:  Continue Pain Control  5:  Discharge home today with [facility]. Scripts sent to patient's pharmacy. Discharge instructions and GAB completed. Follow up with Dr. Keenan Roberts. Patient has a walker at home.      Mamta Fierro CNP

## 2021-03-02 NOTE — PROGRESS NOTES
Patient is alert and oriented x 4. Calls out appropriately. Purple foam dressing to the RLE - hip is CD&I with a Prevena Wound Vac in place. There is no drainage noted in the canister. Neuro checks remain WDL. Patient denies numbness and tingling. Capillary refill and pulse in RLE is WDL. Pain is controlled and medicated per [facility] with relief. Patient denies nausea / vomiting. She is tolerating PO fluids adequately. She has gotten up and ambulated to the bathroom with no issues. She voided 650 mL for the first time since surgery. Vital signs remain within the normal parameters. Will continue to monitor and assess.

## 2021-03-09 ENCOUNTER — OFFICE VISIT (OUTPATIENT)
Dept: ORTHOPEDIC SURGERY | Age: 51
End: 2021-03-09

## 2021-03-09 VITALS
HEART RATE: 79 BPM | WEIGHT: 230 LBS | HEIGHT: 64 IN | BODY MASS INDEX: 39.27 KG/M2 | TEMPERATURE: 97.5 F | DIASTOLIC BLOOD PRESSURE: 77 MMHG | SYSTOLIC BLOOD PRESSURE: 131 MMHG

## 2021-03-09 DIAGNOSIS — Z96.641 STATUS POST TOTAL HIP REPLACEMENT, RIGHT: Primary | ICD-10-CM

## 2021-03-09 PROCEDURE — 99024 POSTOP FOLLOW-UP VISIT: CPT | Performed by: PHYSICIAN ASSISTANT

## 2021-03-09 NOTE — LETTER
[place]  [address] [address]  Phone: [phone]  Fax: [phone]    Judeen Fleischer, MD        March 9, 2021     Patient: Carmelina Harrison   YOB: 1970   Date of Visit: 3/9/2021       To Whom It May Concern: It is my medical opinion that Erick Arredondo should remain out of work until 8 weeks from today. Patient had total hip revision so will need time to recooperate. .    If you have any questions or concerns, please don't hesitate to call.     Sincerely,          Judeen Fleischer, MD

## 2021-03-23 ENCOUNTER — OFFICE VISIT (OUTPATIENT)
Dept: ORTHOPEDIC SURGERY | Age: 51
End: 2021-03-23

## 2021-03-23 VITALS — TEMPERATURE: 97.5 F | HEIGHT: 64 IN | BODY MASS INDEX: 39.27 KG/M2 | WEIGHT: 230 LBS

## 2021-03-23 DIAGNOSIS — Z96.641 HISTORY OF TOTAL HIP ARTHROPLASTY, RIGHT: Primary | ICD-10-CM

## 2021-03-23 PROCEDURE — 99024 POSTOP FOLLOW-UP VISIT: CPT | Performed by: ORTHOPAEDIC SURGERY

## 2021-03-23 NOTE — LETTER
[address] [place] [address]  [address] [facility],First Floor [address]  Phone: [phone]  Fax: [phone]    David Main MD        March 23, 2021     Patient: Vee Bauman   YOB: 1970   Date of Visit: 3/23/2021       To Whom It May Concern: It is my medical opinion that Piyush Goods may return to work on 04/05/21. If you have any questions or concerns, please don't hesitate to call.     Sincerely,      David Main MD

## 2021-03-24 NOTE — PROGRESS NOTES
Patient Name: Dorothy Vivas MRN: [id number]   Age: 45 y.o. YOB: 1970   Sex: female         [address] Complaint   Patient presents with    Hip Pain     Right hip post op, muscle aches, no pain from hip       HISTORY OF PRESENT ILLNESS   Patient returns for their first postoperative evaluation status post total hip revision. The patient is doing very well. They have minimal complaints of pain. Rates pain is a 1-2 out of 10 notes overall she is doing very well. Wants to  Return to work. There is moderate swelling about the Hip. The patient has been doing home physical therapy exercises at home with home therapy. Utilizing crutches to help with ambulation    They deny any calf swelling or numbness or tingling down the leg     Assessment   PHYSICAL EXAM   Vital Signs:    Vitals:    03/23/21 1028   Temp: 97.5 °F (36.4 °C)       Examination of the hip shows a well-healed incision. Edges are well opposed. There is mild swelling. There is no drainage. Range of motion is up to 100. There is no calf tenderness. The patient is neurovascularly intact. No signs of DVT. Calf nontender    Gross distal sensation is decreased in the lateral thigh from the area of the incision as expected. RADIOLOGY   X-Rays reviewed: 2 views of the right hip performed and reviewed today AP and lateral x-rays of the hip show excellent alignment of the total hip prosthesis. There is no loosening no fractures noted. IMPRESSION   3 weeks status post total hip revision    PLAN   The patient is doing well 3 weeks status post total hip revision. No diagnosis found. We will continue home physical therapy for aggressive range of motion and strengthening. Sutures removed and steri strips applied with benzoin. Advised to follow-up in 4-6 weeks  They will continue ice and elevation for the hip. They will continue aspirin therapy for DVT prophylaxis.     Have noted return to work on April 5

## 2021-04-05 ENCOUNTER — TELEPHONE (OUTPATIENT)
Dept: ORTHOPEDIC SURGERY | Age: 51
End: 2021-04-05

## 2021-04-05 RX ORDER — AMOXICILLIN 500 MG/1
1000 CAPSULE ORAL ONCE
Qty: 2 CAPSULE | Refills: 0 | Status: SHIPPED | OUTPATIENT
Start: 2021-04-05 | End: 2021-04-05

## 2021-04-16 ENCOUNTER — HOSPITAL ENCOUNTER (INPATIENT)
Age: 51
LOS: 4 days | Discharge: HOME | DRG: 467 | End: 2021-04-20
Attending: EMERGENCY MEDICINE | Admitting: INTERNAL MEDICINE
Payer: COMMERCIAL

## 2021-04-16 ENCOUNTER — APPOINTMENT (OUTPATIENT)
Dept: GENERAL RADIOLOGY | Age: 51
DRG: 467 | End: 2021-04-16
Payer: COMMERCIAL

## 2021-04-16 ENCOUNTER — TELEPHONE (OUTPATIENT)
Dept: ORTHOPEDIC SURGERY | Age: 51
End: 2021-04-16

## 2021-04-16 DIAGNOSIS — T84.020A DISLOCATION OF INTERNAL RIGHT HIP PROSTHESIS, INITIAL ENCOUNTER (HCC): Primary | ICD-10-CM

## 2021-04-16 PROBLEM — S73.004A CLOSED DISLOCATION OF RIGHT HIP (HCC): Status: ACTIVE | Noted: 2021-04-16

## 2021-04-16 LAB
ALBUMIN SERPL-MCNC: 4.4 G/DL (ref 3.4–5)
ANION GAP SERPL CALCULATED.3IONS-SCNC: 15 MMOL/L (ref 3–16)
BASOPHILS ABSOLUTE: 0.1 K/UL (ref 0–0.2)
BASOPHILS RELATIVE PERCENT: 0.6 %
BUN BLDV-MCNC: 9 MG/DL (ref 7–20)
CALCIUM SERPL-MCNC: 9.5 MG/DL (ref 8.3–10.6)
CHLORIDE BLD-SCNC: 97 MMOL/L (ref 99–110)
CO2: 21 MMOL/L (ref 21–32)
CREAT SERPL-MCNC: 0.7 MG/DL (ref 0.6–1.1)
EOSINOPHILS ABSOLUTE: 0.1 K/UL (ref 0–0.6)
EOSINOPHILS RELATIVE PERCENT: 0.9 %
GFR AFRICAN AMERICAN: >60
GFR NON-AFRICAN AMERICAN: >60
GLUCOSE BLD-MCNC: 88 MG/DL (ref 70–99)
HCT VFR BLD CALC: 40 % (ref 36–48)
HEMOGLOBIN: 13.5 G/DL (ref 12–16)
LYMPHOCYTES ABSOLUTE: 1.5 K/UL (ref 1–5.1)
LYMPHOCYTES RELATIVE PERCENT: 14.6 %
MAGNESIUM: 1.8 MG/DL (ref 1.8–2.4)
MCH RBC QN AUTO: 31.1 PG (ref 26–34)
MCHC RBC AUTO-ENTMCNC: 33.8 G/DL (ref 31–36)
MCV RBC AUTO: 92.2 FL (ref 80–100)
MONOCYTES ABSOLUTE: 0.5 K/UL (ref 0–1.3)
MONOCYTES RELATIVE PERCENT: 4.9 %
NEUTROPHILS ABSOLUTE: 8.2 K/UL (ref 1.7–7.7)
NEUTROPHILS RELATIVE PERCENT: 79 %
PDW BLD-RTO: 13.5 % (ref 12.4–15.4)
PHOSPHORUS: 3.5 MG/DL (ref 2.5–4.9)
PLATELET # BLD: 437 K/UL (ref 135–450)
PMV BLD AUTO: 7.3 FL (ref 5–10.5)
POTASSIUM SERPL-SCNC: 3.7 MMOL/L (ref 3.5–5.1)
RBC # BLD: 4.34 M/UL (ref 4–5.2)
SODIUM BLD-SCNC: 133 MMOL/L (ref 136–145)
WBC # BLD: 10.4 K/UL (ref 4–11)

## 2021-04-16 PROCEDURE — 83735 ASSAY OF MAGNESIUM: CPT

## 2021-04-16 PROCEDURE — 86140 C-REACTIVE PROTEIN: CPT

## 2021-04-16 PROCEDURE — 6360000002 HC RX W HCPCS: Performed by: EMERGENCY MEDICINE

## 2021-04-16 PROCEDURE — 2700000000 HC OXYGEN THERAPY PER DAY

## 2021-04-16 PROCEDURE — 86901 BLOOD TYPING SEROLOGIC RH(D): CPT

## 2021-04-16 PROCEDURE — G0378 HOSPITAL OBSERVATION PER HR: HCPCS

## 2021-04-16 PROCEDURE — 80069 RENAL FUNCTION PANEL: CPT

## 2021-04-16 PROCEDURE — 1200000000 HC SEMI PRIVATE

## 2021-04-16 PROCEDURE — P9016 RBC LEUKOCYTES REDUCED: HCPCS

## 2021-04-16 PROCEDURE — 73502 X-RAY EXAM HIP UNI 2-3 VIEWS: CPT

## 2021-04-16 PROCEDURE — 85025 COMPLETE CBC W/AUTO DIFF WBC: CPT

## 2021-04-16 PROCEDURE — 86900 BLOOD TYPING SEROLOGIC ABO: CPT

## 2021-04-16 PROCEDURE — 86850 RBC ANTIBODY SCREEN: CPT

## 2021-04-16 PROCEDURE — 86923 COMPATIBILITY TEST ELECTRIC: CPT

## 2021-04-16 PROCEDURE — 85652 RBC SED RATE AUTOMATED: CPT

## 2021-04-16 PROCEDURE — 99284 EMERGENCY DEPT VISIT MOD MDM: CPT

## 2021-04-16 PROCEDURE — 73501 X-RAY EXAM HIP UNI 1 VIEW: CPT

## 2021-04-16 RX ORDER — PROPOFOL 10 MG/ML
INJECTION, EMULSION INTRAVENOUS
Status: DISPENSED
Start: 2021-04-16 | End: 2021-04-17

## 2021-04-16 RX ORDER — PROPOFOL 10 MG/ML
INJECTION, EMULSION INTRAVENOUS DAILY PRN
Status: COMPLETED | OUTPATIENT
Start: 2021-04-16 | End: 2021-04-16

## 2021-04-16 RX ADMIN — PROPOFOL 25 MG: 10 INJECTION, EMULSION INTRAVENOUS at 22:44

## 2021-04-16 RX ADMIN — PROPOFOL 100 MG: 10 INJECTION, EMULSION INTRAVENOUS at 22:42

## 2021-04-16 RX ADMIN — PROPOFOL 25 MG: 10 INJECTION, EMULSION INTRAVENOUS at 22:43

## 2021-04-16 ASSESSMENT — PAIN DESCRIPTION - LOCATION: LOCATION: HIP

## 2021-04-16 ASSESSMENT — ENCOUNTER SYMPTOMS
COLOR CHANGE: 0
SHORTNESS OF BREATH: 0
ABDOMINAL PAIN: 0

## 2021-04-16 ASSESSMENT — PAIN SCALES - GENERAL: PAINLEVEL_OUTOF10: 6

## 2021-04-16 ASSESSMENT — PAIN DESCRIPTION - PAIN TYPE: TYPE: ACUTE PAIN

## 2021-04-16 ASSESSMENT — PAIN DESCRIPTION - ORIENTATION: ORIENTATION: RIGHT

## 2021-04-16 NOTE — Clinical Note
Patient Class: Observation [104]  REQUIRED: Diagnosis: Closed dislocation of right hip, initial encounter [facility]) [649589]  Estimated Length of Stay: Estimated stay of less than 2 midnights  Telemetry Bed Required?: No

## 2021-04-17 ENCOUNTER — ANESTHESIA EVENT (OUTPATIENT)
Dept: OPERATING ROOM | Age: 51
DRG: 467 | End: 2021-04-17
Payer: COMMERCIAL

## 2021-04-17 PROBLEM — S73.004A HIP DISLOCATION, RIGHT, INITIAL ENCOUNTER (HCC): Status: ACTIVE | Noted: 2021-04-17

## 2021-04-17 LAB
ABO/RH: NORMAL
ALBUMIN SERPL-MCNC: 3.7 G/DL (ref 3.4–5)
ANION GAP SERPL CALCULATED.3IONS-SCNC: 12 MMOL/L (ref 3–16)
ANTIBODY SCREEN: NORMAL
BASOPHILS ABSOLUTE: 0.1 K/UL (ref 0–0.2)
BASOPHILS RELATIVE PERCENT: 0.8 %
BUN BLDV-MCNC: 9 MG/DL (ref 7–20)
C-REACTIVE PROTEIN: <3 MG/L (ref 0–5.1)
CALCIUM SERPL-MCNC: 9 MG/DL (ref 8.3–10.6)
CHLORIDE BLD-SCNC: 102 MMOL/L (ref 99–110)
CO2: 22 MMOL/L (ref 21–32)
CREAT SERPL-MCNC: 0.6 MG/DL (ref 0.6–1.1)
EOSINOPHILS ABSOLUTE: 0.1 K/UL (ref 0–0.6)
EOSINOPHILS RELATIVE PERCENT: 0.8 %
GFR AFRICAN AMERICAN: >60
GFR NON-AFRICAN AMERICAN: >60
GLUCOSE BLD-MCNC: 119 MG/DL (ref 70–99)
HCT VFR BLD CALC: 37.2 % (ref 36–48)
HEMOGLOBIN: 12.2 G/DL (ref 12–16)
LYMPHOCYTES ABSOLUTE: 1.7 K/UL (ref 1–5.1)
LYMPHOCYTES RELATIVE PERCENT: 16.4 %
MAGNESIUM: 1.7 MG/DL (ref 1.8–2.4)
MCH RBC QN AUTO: 30.7 PG (ref 26–34)
MCHC RBC AUTO-ENTMCNC: 32.9 G/DL (ref 31–36)
MCV RBC AUTO: 93.1 FL (ref 80–100)
MONOCYTES ABSOLUTE: 0.7 K/UL (ref 0–1.3)
MONOCYTES RELATIVE PERCENT: 6.5 %
NEUTROPHILS ABSOLUTE: 7.9 K/UL (ref 1.7–7.7)
NEUTROPHILS RELATIVE PERCENT: 75.5 %
PDW BLD-RTO: 13.5 % (ref 12.4–15.4)
PHOSPHORUS: 4.4 MG/DL (ref 2.5–4.9)
PLATELET # BLD: 418 K/UL (ref 135–450)
PMV BLD AUTO: 7 FL (ref 5–10.5)
POTASSIUM SERPL-SCNC: 3.8 MMOL/L (ref 3.5–5.1)
RBC # BLD: 3.99 M/UL (ref 4–5.2)
SEDIMENTATION RATE, ERYTHROCYTE: 48 MM/HR (ref 0–30)
SODIUM BLD-SCNC: 136 MMOL/L (ref 136–145)
WBC # BLD: 10.5 K/UL (ref 4–11)

## 2021-04-17 PROCEDURE — 1200000000 HC SEMI PRIVATE

## 2021-04-17 PROCEDURE — 85025 COMPLETE CBC W/AUTO DIFF WBC: CPT

## 2021-04-17 PROCEDURE — 36415 COLL VENOUS BLD VENIPUNCTURE: CPT

## 2021-04-17 PROCEDURE — 6360000002 HC RX W HCPCS: Performed by: INTERNAL MEDICINE

## 2021-04-17 PROCEDURE — 83735 ASSAY OF MAGNESIUM: CPT

## 2021-04-17 PROCEDURE — 6370000000 HC RX 637 (ALT 250 FOR IP): Performed by: INTERNAL MEDICINE

## 2021-04-17 PROCEDURE — 80069 RENAL FUNCTION PANEL: CPT

## 2021-04-17 PROCEDURE — 96365 THER/PROPH/DIAG IV INF INIT: CPT

## 2021-04-17 PROCEDURE — 2580000003 HC RX 258: Performed by: INTERNAL MEDICINE

## 2021-04-17 PROCEDURE — 96372 THER/PROPH/DIAG INJ SC/IM: CPT

## 2021-04-17 RX ORDER — SODIUM CHLORIDE 0.9 % (FLUSH) 0.9 %
5-40 SYRINGE (ML) INJECTION PRN
Status: DISCONTINUED | OUTPATIENT
Start: 2021-04-17 | End: 2021-04-20 | Stop reason: HOSPADM

## 2021-04-17 RX ORDER — ACETAMINOPHEN 650 MG/1
650 SUPPOSITORY RECTAL EVERY 6 HOURS PRN
Status: DISCONTINUED | OUTPATIENT
Start: 2021-04-17 | End: 2021-04-20 | Stop reason: HOSPADM

## 2021-04-17 RX ORDER — PANTOPRAZOLE SODIUM 40 MG/1
40 TABLET, DELAYED RELEASE ORAL
Status: DISCONTINUED | OUTPATIENT
Start: 2021-04-17 | End: 2021-04-20 | Stop reason: HOSPADM

## 2021-04-17 RX ORDER — MAGNESIUM SULFATE IN WATER 40 MG/ML
2000 INJECTION, SOLUTION INTRAVENOUS ONCE
Status: COMPLETED | OUTPATIENT
Start: 2021-04-17 | End: 2021-04-17

## 2021-04-17 RX ORDER — GABAPENTIN 400 MG/1
400 CAPSULE ORAL 2 TIMES DAILY
Status: DISCONTINUED | OUTPATIENT
Start: 2021-04-17 | End: 2021-04-19

## 2021-04-17 RX ORDER — ACETAMINOPHEN 325 MG/1
650 TABLET ORAL EVERY 6 HOURS PRN
Status: DISCONTINUED | OUTPATIENT
Start: 2021-04-17 | End: 2021-04-20 | Stop reason: HOSPADM

## 2021-04-17 RX ORDER — ONDANSETRON 2 MG/ML
4 INJECTION INTRAMUSCULAR; INTRAVENOUS EVERY 6 HOURS PRN
Status: DISCONTINUED | OUTPATIENT
Start: 2021-04-17 | End: 2021-04-20 | Stop reason: HOSPADM

## 2021-04-17 RX ORDER — SODIUM CHLORIDE, SODIUM LACTATE, POTASSIUM CHLORIDE, CALCIUM CHLORIDE 600; 310; 30; 20 MG/100ML; MG/100ML; MG/100ML; MG/100ML
INJECTION, SOLUTION INTRAVENOUS CONTINUOUS
Status: DISCONTINUED | OUTPATIENT
Start: 2021-04-17 | End: 2021-04-17

## 2021-04-17 RX ORDER — SODIUM CHLORIDE 9 MG/ML
25 INJECTION, SOLUTION INTRAVENOUS PRN
Status: DISCONTINUED | OUTPATIENT
Start: 2021-04-17 | End: 2021-04-20 | Stop reason: HOSPADM

## 2021-04-17 RX ORDER — DULOXETIN HYDROCHLORIDE 60 MG/1
60 CAPSULE, DELAYED RELEASE ORAL DAILY
Status: DISCONTINUED | OUTPATIENT
Start: 2021-04-17 | End: 2021-04-20 | Stop reason: HOSPADM

## 2021-04-17 RX ORDER — POLYETHYLENE GLYCOL 3350 17 G/17G
17 POWDER, FOR SOLUTION ORAL DAILY PRN
Status: DISCONTINUED | OUTPATIENT
Start: 2021-04-17 | End: 2021-04-20 | Stop reason: HOSPADM

## 2021-04-17 RX ORDER — METOPROLOL SUCCINATE 25 MG/1
25 TABLET, EXTENDED RELEASE ORAL NIGHTLY
Status: DISCONTINUED | OUTPATIENT
Start: 2021-04-17 | End: 2021-04-19

## 2021-04-17 RX ORDER — LISINOPRIL AND HYDROCHLOROTHIAZIDE 25; 20 MG/1; MG/1
1 TABLET ORAL DAILY
Status: DISCONTINUED | OUTPATIENT
Start: 2021-04-17 | End: 2021-04-19

## 2021-04-17 RX ORDER — PROMETHAZINE HYDROCHLORIDE 12.5 MG/1
12.5 TABLET ORAL EVERY 6 HOURS PRN
Status: DISCONTINUED | OUTPATIENT
Start: 2021-04-17 | End: 2021-04-20 | Stop reason: HOSPADM

## 2021-04-17 RX ORDER — MORPHINE SULFATE 2 MG/ML
2 INJECTION, SOLUTION INTRAMUSCULAR; INTRAVENOUS
Status: DISCONTINUED | OUTPATIENT
Start: 2021-04-17 | End: 2021-04-20 | Stop reason: HOSPADM

## 2021-04-17 RX ORDER — SODIUM CHLORIDE, SODIUM LACTATE, POTASSIUM CHLORIDE, CALCIUM CHLORIDE 600; 310; 30; 20 MG/100ML; MG/100ML; MG/100ML; MG/100ML
INJECTION, SOLUTION INTRAVENOUS CONTINUOUS
Status: ACTIVE | OUTPATIENT
Start: 2021-04-18 | End: 2021-04-18

## 2021-04-17 RX ORDER — SODIUM CHLORIDE 0.9 % (FLUSH) 0.9 %
5-40 SYRINGE (ML) INJECTION EVERY 12 HOURS SCHEDULED
Status: DISCONTINUED | OUTPATIENT
Start: 2021-04-17 | End: 2021-04-20 | Stop reason: HOSPADM

## 2021-04-17 RX ADMIN — SODIUM CHLORIDE, POTASSIUM CHLORIDE, SODIUM LACTATE AND CALCIUM CHLORIDE: 600; 310; 30; 20 INJECTION, SOLUTION INTRAVENOUS at 23:40

## 2021-04-17 RX ADMIN — METOPROLOL SUCCINATE 25 MG: 25 TABLET, FILM COATED, EXTENDED RELEASE ORAL at 01:38

## 2021-04-17 RX ADMIN — GABAPENTIN 400 MG: 400 CAPSULE ORAL at 08:19

## 2021-04-17 RX ADMIN — MAGNESIUM SULFATE HEPTAHYDRATE 2000 MG: 40 INJECTION, SOLUTION INTRAVENOUS at 10:25

## 2021-04-17 RX ADMIN — METOPROLOL SUCCINATE 25 MG: 25 TABLET, FILM COATED, EXTENDED RELEASE ORAL at 20:49

## 2021-04-17 RX ADMIN — ENOXAPARIN SODIUM 40 MG: 40 INJECTION SUBCUTANEOUS at 10:25

## 2021-04-17 RX ADMIN — GABAPENTIN 400 MG: 400 CAPSULE ORAL at 20:49

## 2021-04-17 RX ADMIN — PANTOPRAZOLE SODIUM 40 MG: 40 TABLET, DELAYED RELEASE ORAL at 06:11

## 2021-04-17 RX ADMIN — DULOXETINE HYDROCHLORIDE 60 MG: 60 CAPSULE, DELAYED RELEASE ORAL at 08:19

## 2021-04-17 RX ADMIN — SODIUM CHLORIDE, SODIUM LACTATE, POTASSIUM CHLORIDE, AND CALCIUM CHLORIDE: .6; .31; .03; .02 INJECTION, SOLUTION INTRAVENOUS at 01:38

## 2021-04-17 RX ADMIN — Medication 10 ML: at 20:49

## 2021-04-17 RX ADMIN — LISINOPRIL AND HYDROCHLOROTHIAZIDE 1 TABLET: 25; 20 TABLET ORAL at 08:19

## 2021-04-17 RX ADMIN — ACETAMINOPHEN 650 MG: 325 TABLET ORAL at 01:38

## 2021-04-17 RX ADMIN — ACETAMINOPHEN 650 MG: 325 TABLET ORAL at 08:19

## 2021-04-17 ASSESSMENT — PAIN DESCRIPTION - ORIENTATION
ORIENTATION: RIGHT

## 2021-04-17 ASSESSMENT — PAIN DESCRIPTION - PROGRESSION
CLINICAL_PROGRESSION: GRADUALLY IMPROVING
CLINICAL_PROGRESSION: GRADUALLY WORSENING
CLINICAL_PROGRESSION: GRADUALLY IMPROVING
CLINICAL_PROGRESSION: GRADUALLY IMPROVING

## 2021-04-17 ASSESSMENT — PAIN - FUNCTIONAL ASSESSMENT
PAIN_FUNCTIONAL_ASSESSMENT: PREVENTS OR INTERFERES WITH ALL ACTIVE AND SOME PASSIVE ACTIVITIES
PAIN_FUNCTIONAL_ASSESSMENT: PREVENTS OR INTERFERES WITH MANY ACTIVE NOT PASSIVE ACTIVITIES
PAIN_FUNCTIONAL_ASSESSMENT: PREVENTS OR INTERFERES WITH ALL ACTIVE AND SOME PASSIVE ACTIVITIES
PAIN_FUNCTIONAL_ASSESSMENT: PREVENTS OR INTERFERES WITH ALL ACTIVE AND SOME PASSIVE ACTIVITIES

## 2021-04-17 ASSESSMENT — PAIN DESCRIPTION - LOCATION
LOCATION: HIP

## 2021-04-17 ASSESSMENT — PAIN SCALES - GENERAL
PAINLEVEL_OUTOF10: 5
PAINLEVEL_OUTOF10: 0
PAINLEVEL_OUTOF10: 2
PAINLEVEL_OUTOF10: 2
PAINLEVEL_OUTOF10: 3
PAINLEVEL_OUTOF10: 1
PAINLEVEL_OUTOF10: 3

## 2021-04-17 ASSESSMENT — PAIN DESCRIPTION - FREQUENCY
FREQUENCY: CONTINUOUS

## 2021-04-17 ASSESSMENT — PAIN DESCRIPTION - PAIN TYPE
TYPE: ACUTE PAIN

## 2021-04-17 ASSESSMENT — PAIN DESCRIPTION - ONSET
ONSET: ON-GOING

## 2021-04-17 ASSESSMENT — PAIN DESCRIPTION - DESCRIPTORS
DESCRIPTORS: SHARP

## 2021-04-17 NOTE — PROGRESS NOTES
Pt arrived to unit via stretcher from ED. VSS and complains of pain with movement. Pt currently refusing to be turned to have extra linens removed from underneath her, and refusing strong pain medication such as morphine. A&O X4, and oriented to room. Bed in lowest position, call light in reach and bed alarm on. Strict bedrest ordered. Pt NPO.

## 2021-04-17 NOTE — PROGRESS NOTES
4 Eyes Admission Assessment     I agree as the admission nurse that 2 RN's have performed a thorough Head to Toe Skin Assessment on the patient. ALL assessment sites listed below have been assessed on admission. Areas assessed by both nurses:   [x]   Head, Face, and Ears   [x]   Shoulders, Back, and Chest  [x]   Arms, Elbows, and Hands   [x]   Coccyx, Sacrum, and Ischium  [x]   Legs, Feet, and Heels        Does the Patient have Skin Breakdown?   No         Lázaro Prevention initiated:  NA   Wound Care Orders initiated:  NA      WOC nurse consulted for Pressure Injury (Stage 3,4, Unstageable, DTI, NWPT, and Complex wounds) or Lázaro score 18 or lower:  No      Nurse 1 eSignature: Electronically signed by Marizol Gillette RN on 4/17/21 at 5:07 AM EDT    **SHARE this note so that the co-signing nurse is able to place an eSignature**    Nurse 2 eSignature: Electronically signed by Bernabe Ortiz RN on 4/17/21 at 5:08 AM EDT

## 2021-04-17 NOTE — PROGRESS NOTES
Initial assessment completed, VSS, afebrile, pt currently reports having 3/10 pain to R hip for which PRN Tylenol was administered. Pt is refusing morphine. Pt a/ox4, pt's RLE is externally rotated, able to wiggle toes, peripheral vascular assessment WDL. POC discussed with pt, questions/concerns addressed at this time, fall px in place, bed alarm engaged, call light within reach.

## 2021-04-17 NOTE — H&P
Hospital Medicine History & Physical      PCP: Ab Victor    Date of Admission: 4/16/2021    Date of Service: Pt seen/examined on 4/17/2021. Placed in Observation. Chief Complaint:  Hip pain      History Of Present Illness:      45 y.o. female who presents with complaints of right hip pain after getting up from the toilet this evening. When the pain patient felt that she is on stable on the right side. The pain gradually got worse and patient was no longer able to bear weight due to extreme instability in the right hip. Patient denies any falls or injury. Patient has had a revision of total hip arthroplasty on March first, 2021 fall right hip posterior dislocation. Attempts to reduce the dislocation in ED under sedation was unsuccessful and patient was admitted for OR tomorrow morning by orthopedic surgery. Past Medical History:          Diagnosis Date    Arthritis     Hyperlipidemia     Hypertension     Thyroid disease     was hyperthyroid       Past Surgical History:          Procedure Laterality Date    COLONOSCOPY      FOOT SURGERY Bilateral     3 on each foot    OTHER SURGICAL HISTORY      Spinal stimulator left hip    REVISION TOTAL HIP ARTHROPLASTY Right 2/26/2021    RIGHT HIP CLOSED REDUCTION performed by Damaris Rae MD at [address] [place] [address] [address] Right 8/10/2020    RIGHT TOTAL HIP ARTHROPLASTY ANTERIOR APPROACH performed by Damaris Rae MD at [address] [place] [address] [address] Right 3/1/2021    HIP TOTAL ARTHROPLASTY ANTERIOR APPROACH REVISION performed by Damaris Rae MD at / [address] Bilateral        Medications Prior to Admission:      Prior to Admission medications    Medication Sig Start Date End Date Taking?  Authorizing Provider   aspirin 325 MG EC tablet Take 1 tablet by mouth 2 times daily 3/2/21   CORNELIO Liang - CNP   metroNIDAZOLE (FLAGYL) 500 MG tablet Take 500 mg by mouth 2 times daily 2/19/21 Historical Provider, MD   metoprolol succinate (TOPROL XL) 25 MG extended release tablet Take 25 mg by mouth nightly 1/4/21   Historical Provider, MD   bisacodyl (DULCOLAX) 5 MG EC tablet Take 1 tablet by mouth 2 times daily as needed for Constipation 8/11/20   REICA Bush   tiZANidine (ZANAFLEX) 4 MG tablet Take 1 tablet by mouth every 8 hours as needed (muscle spasm) 8/11/20   ERICA Bush   vitamin B-12 (CYANOCOBALAMIN) 1000 MCG tablet Take 1,000 mcg by mouth daily    Historical Provider, MD   gabapentin (NEURONTIN) 400 MG capsule Take 400 mg by mouth 3 times daily. Historical Provider, MD   omeprazole (PRILOSEC) 20 MG delayed release capsule Take 20 mg by mouth daily    Historical Provider, MD   cetirizine (ZYRTEC) 10 MG tablet Take 10 mg by mouth daily    Historical Provider, MD   cycloSPORINE (RESTASIS) 0.05 % ophthalmic emulsion 1 drop 2 times daily    Historical Provider, MD   lisinopril-hydrochlorothiazide (PRINZIDE;ZESTORETIC) 20-25 MG per tablet Take 1 tablet by mouth daily. Historical Provider, MD   acyclovir (ZOVIRAX) 200 MG capsule Take 800 mg by mouth daily. Historical Provider, MD   DULoxetine (CYMBALTA) 60 MG capsule Take 60 mg by mouth daily. Historical Provider, MD   Polyethylene Glycol 3350 (MIRALAX PO) Take  by mouth. Historical Provider, MD       Allergies:  Adhesive tape    Social History:    TOBACCO:   reports that she quit smoking about 2 years ago. She has never used smokeless tobacco.  ETOH:   reports current alcohol use. E-Cigarettes/Vaping Use     Questions Responses    E-Cigarette/Vaping Use Never User    Start Date     Passive Exposure     Quit Date     Counseling Given     Comments           Family History:    Reviewed in detail and negative for DM, CAD, Cancer, CVA. Positive as follows:    History reviewed. No pertinent family history. REVIEW OF SYSTEMS:   Pertinent positives as noted in the HPI. All other systems reviewed and negative.     PHYSICAL EXAM PERFORMED:    BP (!) 153/87   Pulse 71   Temp 97.3 °F (36.3 °C) (Oral)   Resp 19   Ht 5' 4\" (1.626 m)   Wt 227 lb (103 kg)   SpO2 100%   BMI 38.96 kg/m²     General appearance:  No apparent distress, appears stated age and cooperative. HEENT:  Normal cephalic, atraumatic without obvious deformity. Pupils equal, round, and reactive to light. Extra ocular muscles intact. Conjunctivae/corneas clear. Neck: Supple, with full range of motion. No jugular venous distention. Trachea midline. Respiratory:  Normal respiratory effort. Clear to auscultation, bilaterally without Rales/Wheezes/Rhonchi. Cardiovascular:  Regular rate and rhythm with normal S1/S2 without murmurs, rubs or gallops. Abdomen: Soft, non-tender, non-distended with normal bowel sounds. Musculoskeletal:  No clubbing, cyanosis or edema bilaterally. Shortening and external rotation of the right lower extremity with pain on logroll, intact peripheral pulses as well as sensation and motor function  Skin: Skin color, texture, turgor normal.  No rashes or lesions. Neurologic:  Neurovascularly intact without any focal sensory/motor deficits. Cranial nerves: II-XII intact, grossly non-focal.  Psychiatric:  Alert and oriented, thought content appropriate, normal insight  Capillary Refill: Brisk,< 3 seconds   Peripheral Pulses: +2 palpable, equal bilaterally       Labs:     No results for input(s): WBC, HGB, HCT, PLT in the last 72 hours. No results for input(s): NA, K, CL, CO2, BUN, CREATININE, CALCIUM, PHOS in the last 72 hours. Invalid input(s): MAGNES  No results for input(s): AST, ALT, BILIDIR, BILITOT, ALKPHOS in the last 72 hours. No results for input(s): INR in the last 72 hours. No results for input(s): Burnice A[place]ac in the last 72 hours.     Urinalysis:    No results found for: Susan Beau, BACTERIA, RBCUA, BLOODU, [address]    Radiology:     CXR: I have reviewed the CXR with the following interpretation: N/A  EKG:  I

## 2021-04-17 NOTE — PLAN OF CARE
Problem: Falls - Risk of:  Goal: Will remain free from falls  Description: Will remain free from falls  4/17/2021 1648 by Julianne Lesch, RN  Outcome: Met This Shift  4/17/2021 1647 by Julianne Lesch, RN  Outcome: Met This Shift  4/17/2021 0516 by Nesha Olvera RN  Outcome: Ongoing  Goal: Absence of physical injury  Description: Absence of physical injury  4/17/2021 1648 by Julianne Lesch, RN  Outcome: Met This Shift  4/17/2021 1647 by Julianne Lesch, RN  Outcome: Met This Shift  4/17/2021 0516 by Nesha Olvera RN  Outcome: Ongoing     Problem: Skin Integrity:  Goal: Will show no infection signs and symptoms  Description: Will show no infection signs and symptoms  4/17/2021 1648 by Julianne Lesch, RN  Outcome: Met This Shift  4/17/2021 1647 by Julianne Lesch, RN  Outcome: Met This Shift  4/17/2021 0516 by Nesha Olvera RN  Outcome: Ongoing  Goal: Absence of new skin breakdown  Description: Absence of new skin breakdown  4/17/2021 1648 by Julianne Lesch, RN  Outcome: Met This Shift  4/17/2021 1647 by Julianne Lesch, RN  Outcome: Met This Shift  4/17/2021 0516 by Nesha Olvera RN  Outcome: Ongoing     Problem: Discharge Planning:  Goal: Knowledge of discharge instructions  Description: Knowledge of discharge instructions  Outcome: Met This Shift     Problem: Infection - Surgical Site:  Goal: Signs of wound healing will improve  Description: Signs of wound healing will improve  Outcome: Met This Shift     Problem: Mobility - Impaired:  Goal: Achieve maximum mobility level  Description: Achieve maximum mobility level  Outcome: Ongoing     Problem: Pain - Acute:  Goal: Pain level will decrease  Description: Pain level will decrease  4/17/2021 1648 by Julianne Lesch, RN  Outcome: Met This Shift  4/17/2021 1647 by Julianne Lesch, RN  Outcome: Met This Shift  4/17/2021 0516 by Nesha Olvera RN  Outcome: Ongoing

## 2021-04-17 NOTE — ED PROVIDER NOTES
Date of evaluation: 4/16/2021    Chief Complaint   Hip Pain (dislocated R hip )      Nursing Notes, Past Medical Hx, Past Surgical Hx, Social Hx, Allergies, and Family Hx were reviewed. History of Present Illness     Gigi Farooq is a 45 y.o. female who presents with right hip pain and instability. She got off the toilet earlier today and started to notice some pain in her right hip that was exacerbated by walking. This acutely worsened and she was no longer able to bear weight as it felt unstable this evening when she attempted to take the trash cans out. She denies loss of motor function, skin color change, cold extremity or sensory deficit. She denies any other injuries. She has had a history of a total hip arthroplasty that was recently revised. This feels similar to prior prosthetic hip dislocation. Pain is constant, moderate to severe and exacerbated by movement. Review of Systems     Review of Systems   Constitutional: Negative for activity change, appetite change and fever. HENT: Negative for congestion. Respiratory: Negative for shortness of breath. Cardiovascular: Negative for chest pain. Gastrointestinal: Negative for abdominal pain. Musculoskeletal: Positive for gait problem. Skin: Negative for color change, pallor and wound. Neurological: Negative for syncope and weakness. All other systems reviewed and are negative. Past Medical, Surgical, Family, and Social History     She has a past medical history of Arthritis, Hyperlipidemia, Hypertension, and Thyroid disease. She has a past surgical history that includes Foot surgery (Bilateral); Tympanostomy tube placement (Bilateral); Colonoscopy; other surgical history; Total hip arthroplasty (Right, 8/10/2020); Revision total hip arthroplasty (Right, 2/26/2021); and Total hip arthroplasty (Right, 3/1/2021). Her family history is not on file. She reports that she quit smoking about 2 years ago.  She has never used smokeless tobacco. She reports current alcohol use. She reports current drug use. Frequency: 7.00 times per week. Drug: Marijuana. Medications     Previous Medications    ACYCLOVIR (ZOVIRAX) 200 MG CAPSULE    Take 800 mg by mouth daily. ASPIRIN 325 MG EC TABLET    Take 1 tablet by mouth 2 times daily    BISACODYL (DULCOLAX) 5 MG EC TABLET    Take 1 tablet by mouth 2 times daily as needed for Constipation    CETIRIZINE (ZYRTEC) 10 MG TABLET    Take 10 mg by mouth daily    CYCLOSPORINE (RESTASIS) 0.05 % OPHTHALMIC EMULSION    1 drop 2 times daily    DULOXETINE (CYMBALTA) 60 MG CAPSULE    Take 60 mg by mouth daily. GABAPENTIN (NEURONTIN) 400 MG CAPSULE    Take 400 mg by mouth 3 times daily. LISINOPRIL-HYDROCHLOROTHIAZIDE (PRINZIDE;ZESTORETIC) 20-25 MG PER TABLET    Take 1 tablet by mouth daily. METOPROLOL SUCCINATE (TOPROL XL) 25 MG EXTENDED RELEASE TABLET    Take 25 mg by mouth nightly    METRONIDAZOLE (FLAGYL) 500 MG TABLET    Take 500 mg by mouth 2 times daily    OMEPRAZOLE (PRILOSEC) 20 MG DELAYED RELEASE CAPSULE    Take 20 mg by mouth daily    POLYETHYLENE GLYCOL 3350 (MIRALAX PO)    Take  by mouth. TIZANIDINE (ZANAFLEX) 4 MG TABLET    Take 1 tablet by mouth every 8 hours as needed (muscle spasm)    VITAMIN B-12 (CYANOCOBALAMIN) 1000 MCG TABLET    Take 1,000 mcg by mouth daily       Allergies     She is allergic to adhesive tape. Physical Exam     INITIAL VITALS: BP (!) 151/94   Pulse 85   Temp 97.3 °F (36.3 °C) (Oral)   Resp 24   Ht 5' 4\" (1.626 m)   Wt 227 lb (103 kg)   SpO2 100%   BMI 38.96 kg/m²    Physical Exam  Vitals signs and nursing note reviewed. Constitutional:       General: She is not in acute distress. Appearance: Normal appearance. She is not ill-appearing. HENT:      Head: Normocephalic and atraumatic.       Comments: Normal mouth opening     Mouth/Throat:      Comments: Clear, Mallampati 1  Eyes:      Conjunctiva/sclera: Conjunctivae normal.      Pupils: Pupils contacted us in the emergency department to let us know that she would be arriving. He recommended attempt at ED hip reduction and consulting him if unsuccessful. At this time I am off-going and have signed out care of this patient to my colleague, Dr Robert Mcghee. Aspects of this patient's care that are pending at this time include:  -Procedural sedation, right hip reduction, reassessment, disposition        Clinical Impression     1.  Dislocation of internal right hip prosthesis, initial encounter ([facility] [address]        Ismael Benavidez     pending     Austin Black MD  04/16/21 [id number]

## 2021-04-17 NOTE — ED PROVIDER NOTES
[address] ENCOUNTER          ATTENDING PHYSICIAN NOTE       Date of evaluation: 4/16/2021    ADDENDUM:      Care of this patient was assumed from Dr. Luis Esquivel. The patient was seen for Hip Pain (dislocated R hip )  . The patient's initial evaluation and plan have been discussed with the prior provider who initially evaluated the patient. Nursing Notes, Past Medical Hx, Past Surgical Hx, Social Hx, Allergies, and Family Hx were all reviewed. Diagnostic Results     RADIOLOGY:  XR HIP 2-3 VW W PELVIS RIGHT   Final Result   Impression:    Dislocation of femoral head component of right total hip arthroplasty from acetabular component. XR HIP RIGHT (1 VIEW)    (Results Pending)       LABS:   No results found for this visit on 04/16/21. RECENT VITALS:  BP: (!) 153/87, Temp: 97.3 °F (36.3 °C), Pulse: 71, Resp: 19, SpO2: 100 %       ED Course     The patient was given the following medications:  Orders Placed This Encounter   Medications    propofol 1000 MG/100ML injection     Viky Negron: cabinet override    propofol injection       CONSULTS:  Krystal Henriquez / ASSESSMENT / Kourtney Susan is a 45 y.o. female who presents with emergency department after sustaining a dislocation to her right hip. Patient with previous right hip replacement. She is postop day 7 from this. We attempted reduction of this hip dislocation using procedural sedation with propofol. A total of 150 mg of propofol was used. The patient did achieve adequate sedation. We attempted closed reduction using Sherice Garre technique and Golden Core technique without success on post attempted reduction x-rays. Patient will be admitted for operative intervention    Clinical Impression     1.  Dislocation of internal right hip prosthesis, initial encounter [facility]        Disposition     PATIENT REFERRED TO:  No follow-up provider specified.     DISCHARGE MEDICATIONS:  New Prescriptions    No medications on file       DISPOSITION         Argyle Soulier, MD  04/16/21 7487

## 2021-04-17 NOTE — PLAN OF CARE
Problem: Falls - Risk of:  Goal: Will remain free from falls  Description: Will remain free from falls  Outcome: Ongoing   Call light in reach, bed in lowest position, bed alarm on, pt utilizes call light & knows limitations due to injury. Strict bed rest understanding verbalized. Problem: Pain:  Goal: Pain level will decrease  Description: Pain level will decrease  Outcome: Ongoing   PRN medication as ordered, position to increase comfort, promote relaxation.

## 2021-04-17 NOTE — PROGRESS NOTES
RT on stand by for conscious sedation procedure. Pt tolerated sedation well, ETCO2 remained at 32 mmhg throughout procedure. Immediately after pt moved to room 4, ETCO2 left in place while pt wakes up.

## 2021-04-18 ENCOUNTER — APPOINTMENT (OUTPATIENT)
Dept: GENERAL RADIOLOGY | Age: 51
DRG: 467 | End: 2021-04-18
Payer: COMMERCIAL

## 2021-04-18 ENCOUNTER — ANESTHESIA (OUTPATIENT)
Dept: OPERATING ROOM | Age: 51
DRG: 467 | End: 2021-04-18
Payer: COMMERCIAL

## 2021-04-18 VITALS — DIASTOLIC BLOOD PRESSURE: 87 MMHG | SYSTOLIC BLOOD PRESSURE: 163 MMHG | TEMPERATURE: 98.2 F | OXYGEN SATURATION: 100 %

## 2021-04-18 LAB
ALBUMIN SERPL-MCNC: 3.5 G/DL (ref 3.4–5)
ANION GAP SERPL CALCULATED.3IONS-SCNC: 9 MMOL/L (ref 3–16)
BASOPHILS ABSOLUTE: 0.1 K/UL (ref 0–0.2)
BASOPHILS RELATIVE PERCENT: 0.7 %
BUN BLDV-MCNC: 10 MG/DL (ref 7–20)
CALCIUM SERPL-MCNC: 9 MG/DL (ref 8.3–10.6)
CHLORIDE BLD-SCNC: 102 MMOL/L (ref 99–110)
CO2: 24 MMOL/L (ref 21–32)
CREAT SERPL-MCNC: 0.6 MG/DL (ref 0.6–1.1)
EOSINOPHILS ABSOLUTE: 0.1 K/UL (ref 0–0.6)
EOSINOPHILS RELATIVE PERCENT: 1.3 %
GFR AFRICAN AMERICAN: >60
GFR NON-AFRICAN AMERICAN: >60
GLUCOSE BLD-MCNC: 103 MG/DL (ref 70–99)
HCT VFR BLD CALC: 30.7 % (ref 36–48)
HCT VFR BLD CALC: 37.3 % (ref 36–48)
HEMOGLOBIN: 10.1 G/DL (ref 12–16)
HEMOGLOBIN: 12.1 G/DL (ref 12–16)
LYMPHOCYTES ABSOLUTE: 2 K/UL (ref 1–5.1)
LYMPHOCYTES RELATIVE PERCENT: 26 %
MAGNESIUM: 2 MG/DL (ref 1.8–2.4)
MCH RBC QN AUTO: 30.3 PG (ref 26–34)
MCH RBC QN AUTO: 30.9 PG (ref 26–34)
MCHC RBC AUTO-ENTMCNC: 32.5 G/DL (ref 31–36)
MCHC RBC AUTO-ENTMCNC: 33 G/DL (ref 31–36)
MCV RBC AUTO: 91.6 FL (ref 80–100)
MCV RBC AUTO: 95 FL (ref 80–100)
MONOCYTES ABSOLUTE: 0.6 K/UL (ref 0–1.3)
MONOCYTES RELATIVE PERCENT: 7.6 %
NEUTROPHILS ABSOLUTE: 5 K/UL (ref 1.7–7.7)
NEUTROPHILS RELATIVE PERCENT: 64.4 %
PDW BLD-RTO: 13.9 % (ref 12.4–15.4)
PDW BLD-RTO: 15.6 % (ref 12.4–15.4)
PHOSPHORUS: 3.9 MG/DL (ref 2.5–4.9)
PLATELET # BLD: 356 K/UL (ref 135–450)
PLATELET # BLD: 379 K/UL (ref 135–450)
PMV BLD AUTO: 6.9 FL (ref 5–10.5)
PMV BLD AUTO: 7.2 FL (ref 5–10.5)
POTASSIUM SERPL-SCNC: 4.5 MMOL/L (ref 3.5–5.1)
RBC # BLD: 3.35 M/UL (ref 4–5.2)
RBC # BLD: 3.93 M/UL (ref 4–5.2)
SODIUM BLD-SCNC: 135 MMOL/L (ref 136–145)
WBC # BLD: 18.5 K/UL (ref 4–11)
WBC # BLD: 7.8 K/UL (ref 4–11)

## 2021-04-18 PROCEDURE — 85027 COMPLETE CBC AUTOMATED: CPT

## 2021-04-18 PROCEDURE — 6360000002 HC RX W HCPCS: Performed by: ORTHOPAEDIC SURGERY

## 2021-04-18 PROCEDURE — 27134 REVISE HIP JOINT REPLACEMENT: CPT | Performed by: ORTHOPAEDIC SURGERY

## 2021-04-18 PROCEDURE — 3700000001 HC ADD 15 MINUTES (ANESTHESIA): Performed by: ORTHOPAEDIC SURGERY

## 2021-04-18 PROCEDURE — 2580000003 HC RX 258: Performed by: ANESTHESIOLOGY

## 2021-04-18 PROCEDURE — C1776 JOINT DEVICE (IMPLANTABLE): HCPCS | Performed by: ORTHOPAEDIC SURGERY

## 2021-04-18 PROCEDURE — 99253 IP/OBS CNSLTJ NEW/EST LOW 45: CPT | Performed by: ORTHOPAEDIC SURGERY

## 2021-04-18 PROCEDURE — 87102 FUNGUS ISOLATION CULTURE: CPT

## 2021-04-18 PROCEDURE — 83735 ASSAY OF MAGNESIUM: CPT

## 2021-04-18 PROCEDURE — 2500000003 HC RX 250 WO HCPCS: Performed by: ANESTHESIOLOGY

## 2021-04-18 PROCEDURE — 2580000003 HC RX 258: Performed by: ORTHOPAEDIC SURGERY

## 2021-04-18 PROCEDURE — 3700000000 HC ANESTHESIA ATTENDED CARE: Performed by: ORTHOPAEDIC SURGERY

## 2021-04-18 PROCEDURE — 6360000002 HC RX W HCPCS: Performed by: ANESTHESIOLOGY

## 2021-04-18 PROCEDURE — 3209999900 FLUORO FOR SURGICAL PROCEDURES

## 2021-04-18 PROCEDURE — 3600000005 HC SURGERY LEVEL 5 BASE: Performed by: ORTHOPAEDIC SURGERY

## 2021-04-18 PROCEDURE — 7100000000 HC PACU RECOVERY - FIRST 15 MIN: Performed by: ORTHOPAEDIC SURGERY

## 2021-04-18 PROCEDURE — 3600000015 HC SURGERY LEVEL 5 ADDTL 15MIN: Performed by: ORTHOPAEDIC SURGERY

## 2021-04-18 PROCEDURE — 73502 X-RAY EXAM HIP UNI 2-3 VIEWS: CPT

## 2021-04-18 PROCEDURE — P9041 ALBUMIN (HUMAN),5%, 50ML: HCPCS | Performed by: ANESTHESIOLOGY

## 2021-04-18 PROCEDURE — 80069 RENAL FUNCTION PANEL: CPT

## 2021-04-18 PROCEDURE — 36415 COLL VENOUS BLD VENIPUNCTURE: CPT

## 2021-04-18 PROCEDURE — 87070 CULTURE OTHR SPECIMN AEROBIC: CPT

## 2021-04-18 PROCEDURE — 27134 REVISE HIP JOINT REPLACEMENT: CPT | Performed by: PHYSICIAN ASSISTANT

## 2021-04-18 PROCEDURE — P9016 RBC LEUKOCYTES REDUCED: HCPCS

## 2021-04-18 PROCEDURE — 7100000001 HC PACU RECOVERY - ADDTL 15 MIN: Performed by: ORTHOPAEDIC SURGERY

## 2021-04-18 PROCEDURE — 6360000002 HC RX W HCPCS: Performed by: INTERNAL MEDICINE

## 2021-04-18 PROCEDURE — 6370000000 HC RX 637 (ALT 250 FOR IP): Performed by: INTERNAL MEDICINE

## 2021-04-18 PROCEDURE — 2720000010 HC SURG SUPPLY STERILE: Performed by: ORTHOPAEDIC SURGERY

## 2021-04-18 PROCEDURE — 87015 SPECIMEN INFECT AGNT CONCNTJ: CPT

## 2021-04-18 PROCEDURE — 1200000000 HC SEMI PRIVATE

## 2021-04-18 PROCEDURE — C1713 ANCHOR/SCREW BN/BN,TIS/BN: HCPCS | Performed by: ORTHOPAEDIC SURGERY

## 2021-04-18 PROCEDURE — 6360000002 HC RX W HCPCS: Performed by: PHYSICIAN ASSISTANT

## 2021-04-18 PROCEDURE — 2580000003 HC RX 258: Performed by: INTERNAL MEDICINE

## 2021-04-18 PROCEDURE — 87206 SMEAR FLUORESCENT/ACID STAI: CPT

## 2021-04-18 PROCEDURE — 6370000000 HC RX 637 (ALT 250 FOR IP): Performed by: PHYSICIAN ASSISTANT

## 2021-04-18 PROCEDURE — 2580000003 HC RX 258: Performed by: PHYSICIAN ASSISTANT

## 2021-04-18 PROCEDURE — 2709999900 HC NON-CHARGEABLE SUPPLY: Performed by: ORTHOPAEDIC SURGERY

## 2021-04-18 PROCEDURE — 87116 MYCOBACTERIA CULTURE: CPT

## 2021-04-18 PROCEDURE — 85025 COMPLETE CBC W/AUTO DIFF WBC: CPT

## 2021-04-18 PROCEDURE — 87205 SMEAR GRAM STAIN: CPT

## 2021-04-18 DEVICE — REDAPT LOCKING SCREW 15MM
Type: IMPLANTABLE DEVICE | Site: HIP | Status: FUNCTIONAL
Brand: REDAPT

## 2021-04-18 DEVICE — OXINIUM FEMORAL HEAD 12/14 TAPER                                    28 MM +4
Type: IMPLANTABLE DEVICE | Site: HIP | Status: FUNCTIONAL
Brand: OXINIUM

## 2021-04-18 DEVICE — REDAPT LOCKING SCREW 30MM
Type: IMPLANTABLE DEVICE | Site: HIP | Status: FUNCTIONAL
Brand: REDAPT

## 2021-04-18 DEVICE — OR3O DUAL MOBILITY LINER 44/56
Type: IMPLANTABLE DEVICE | Site: HIP | Status: FUNCTIONAL
Brand: OR3O DUAL MOBILITY

## 2021-04-18 DEVICE — REFLECTION SPHERICAL HEAD SCREW 35MM
Type: IMPLANTABLE DEVICE | Site: HIP | Status: FUNCTIONAL
Brand: REFLECTION

## 2021-04-18 DEVICE — REDAPT MODULAR SHELL 56MM
Type: IMPLANTABLE DEVICE | Site: HIP | Status: FUNCTIONAL
Brand: REDAPT

## 2021-04-18 DEVICE — OR3O DUAL MOBILITY XLPE INSERT 28/44
Type: IMPLANTABLE DEVICE | Site: HIP | Status: FUNCTIONAL
Brand: OR3O DUAL MOBILITY

## 2021-04-18 RX ORDER — MAGNESIUM HYDROXIDE 1200 MG/15ML
LIQUID ORAL CONTINUOUS PRN
Status: COMPLETED | OUTPATIENT
Start: 2021-04-18 | End: 2021-04-18

## 2021-04-18 RX ORDER — ONDANSETRON 2 MG/ML
4 INJECTION INTRAMUSCULAR; INTRAVENOUS EVERY 6 HOURS PRN
Status: DISCONTINUED | OUTPATIENT
Start: 2021-04-18 | End: 2021-04-18 | Stop reason: SDUPTHER

## 2021-04-18 RX ORDER — ONDANSETRON 2 MG/ML
INJECTION INTRAMUSCULAR; INTRAVENOUS PRN
Status: DISCONTINUED | OUTPATIENT
Start: 2021-04-18 | End: 2021-04-18 | Stop reason: SDUPTHER

## 2021-04-18 RX ORDER — SODIUM CHLORIDE 9 MG/ML
INJECTION, SOLUTION INTRAVENOUS CONTINUOUS PRN
Status: DISCONTINUED | OUTPATIENT
Start: 2021-04-18 | End: 2021-04-18 | Stop reason: SDUPTHER

## 2021-04-18 RX ORDER — OXYCODONE HYDROCHLORIDE 5 MG/1
10 TABLET ORAL EVERY 4 HOURS PRN
Status: DISCONTINUED | OUTPATIENT
Start: 2021-04-18 | End: 2021-04-20 | Stop reason: HOSPADM

## 2021-04-18 RX ORDER — SODIUM CHLORIDE 9 MG/ML
25 INJECTION, SOLUTION INTRAVENOUS PRN
Status: DISCONTINUED | OUTPATIENT
Start: 2021-04-18 | End: 2021-04-20 | Stop reason: HOSPADM

## 2021-04-18 RX ORDER — NEOSTIGMINE METHYLSULFATE 1 MG/ML
INJECTION, SOLUTION INTRAVENOUS PRN
Status: DISCONTINUED | OUTPATIENT
Start: 2021-04-18 | End: 2021-04-18 | Stop reason: SDUPTHER

## 2021-04-18 RX ORDER — SODIUM CHLORIDE 450 MG/100ML
INJECTION, SOLUTION INTRAVENOUS CONTINUOUS
Status: DISCONTINUED | OUTPATIENT
Start: 2021-04-18 | End: 2021-04-19

## 2021-04-18 RX ORDER — LIDOCAINE HYDROCHLORIDE 20 MG/ML
INJECTION, SOLUTION INTRAVENOUS PRN
Status: DISCONTINUED | OUTPATIENT
Start: 2021-04-18 | End: 2021-04-18 | Stop reason: SDUPTHER

## 2021-04-18 RX ORDER — SENNA AND DOCUSATE SODIUM 50; 8.6 MG/1; MG/1
1 TABLET, FILM COATED ORAL 2 TIMES DAILY
Status: DISCONTINUED | OUTPATIENT
Start: 2021-04-18 | End: 2021-04-20 | Stop reason: HOSPADM

## 2021-04-18 RX ORDER — VANCOMYCIN HYDROCHLORIDE 1 G/20ML
INJECTION, POWDER, LYOPHILIZED, FOR SOLUTION INTRAVENOUS PRN
Status: DISCONTINUED | OUTPATIENT
Start: 2021-04-18 | End: 2021-04-18 | Stop reason: ALTCHOICE

## 2021-04-18 RX ORDER — SODIUM CHLORIDE, SODIUM LACTATE, POTASSIUM CHLORIDE, CALCIUM CHLORIDE 600; 310; 30; 20 MG/100ML; MG/100ML; MG/100ML; MG/100ML
INJECTION, SOLUTION INTRAVENOUS CONTINUOUS PRN
Status: DISCONTINUED | OUTPATIENT
Start: 2021-04-18 | End: 2021-04-18 | Stop reason: SDUPTHER

## 2021-04-18 RX ORDER — ROCURONIUM BROMIDE 10 MG/ML
INJECTION, SOLUTION INTRAVENOUS PRN
Status: DISCONTINUED | OUTPATIENT
Start: 2021-04-18 | End: 2021-04-18 | Stop reason: SDUPTHER

## 2021-04-18 RX ORDER — OXYCODONE HYDROCHLORIDE 5 MG/1
5 TABLET ORAL EVERY 4 HOURS PRN
Status: DISCONTINUED | OUTPATIENT
Start: 2021-04-18 | End: 2021-04-20 | Stop reason: HOSPADM

## 2021-04-18 RX ORDER — ALBUMIN, HUMAN INJ 5% 5 %
SOLUTION INTRAVENOUS PRN
Status: DISCONTINUED | OUTPATIENT
Start: 2021-04-18 | End: 2021-04-18 | Stop reason: SDUPTHER

## 2021-04-18 RX ORDER — FENTANYL CITRATE 50 UG/ML
25 INJECTION, SOLUTION INTRAMUSCULAR; INTRAVENOUS EVERY 5 MIN PRN
Status: DISCONTINUED | OUTPATIENT
Start: 2021-04-18 | End: 2021-04-18 | Stop reason: HOSPADM

## 2021-04-18 RX ORDER — PROMETHAZINE HYDROCHLORIDE 25 MG/ML
6.25 INJECTION, SOLUTION INTRAMUSCULAR; INTRAVENOUS
Status: DISCONTINUED | OUTPATIENT
Start: 2021-04-18 | End: 2021-04-18 | Stop reason: HOSPADM

## 2021-04-18 RX ORDER — FENTANYL CITRATE 50 UG/ML
INJECTION, SOLUTION INTRAMUSCULAR; INTRAVENOUS PRN
Status: DISCONTINUED | OUTPATIENT
Start: 2021-04-18 | End: 2021-04-18 | Stop reason: SDUPTHER

## 2021-04-18 RX ORDER — PROMETHAZINE HYDROCHLORIDE 12.5 MG/1
12.5 TABLET ORAL EVERY 6 HOURS PRN
Status: DISCONTINUED | OUTPATIENT
Start: 2021-04-18 | End: 2021-04-18 | Stop reason: SDUPTHER

## 2021-04-18 RX ORDER — VANCOMYCIN HYDROCHLORIDE 1 G/20ML
INJECTION, POWDER, LYOPHILIZED, FOR SOLUTION INTRAVENOUS PRN
Status: DISCONTINUED | OUTPATIENT
Start: 2021-04-18 | End: 2021-04-18 | Stop reason: SDUPTHER

## 2021-04-18 RX ORDER — SODIUM CHLORIDE 0.9 % (FLUSH) 0.9 %
10 SYRINGE (ML) INJECTION PRN
Status: DISCONTINUED | OUTPATIENT
Start: 2021-04-18 | End: 2021-04-20 | Stop reason: HOSPADM

## 2021-04-18 RX ORDER — GLYCOPYRROLATE 0.2 MG/ML
INJECTION INTRAMUSCULAR; INTRAVENOUS PRN
Status: DISCONTINUED | OUTPATIENT
Start: 2021-04-18 | End: 2021-04-18 | Stop reason: SDUPTHER

## 2021-04-18 RX ORDER — ONDANSETRON 2 MG/ML
4 INJECTION INTRAMUSCULAR; INTRAVENOUS
Status: DISCONTINUED | OUTPATIENT
Start: 2021-04-18 | End: 2021-04-18 | Stop reason: HOSPADM

## 2021-04-18 RX ORDER — ACETAMINOPHEN 325 MG/1
650 TABLET ORAL EVERY 6 HOURS
Status: DISCONTINUED | OUTPATIENT
Start: 2021-04-18 | End: 2021-04-20 | Stop reason: HOSPADM

## 2021-04-18 RX ORDER — MIDAZOLAM HYDROCHLORIDE 1 MG/ML
INJECTION INTRAMUSCULAR; INTRAVENOUS PRN
Status: DISCONTINUED | OUTPATIENT
Start: 2021-04-18 | End: 2021-04-18 | Stop reason: SDUPTHER

## 2021-04-18 RX ORDER — CEFAZOLIN SODIUM 2 G/50ML
2000 SOLUTION INTRAVENOUS EVERY 8 HOURS
Status: COMPLETED | OUTPATIENT
Start: 2021-04-18 | End: 2021-04-19

## 2021-04-18 RX ORDER — PROPOFOL 10 MG/ML
INJECTION, EMULSION INTRAVENOUS PRN
Status: DISCONTINUED | OUTPATIENT
Start: 2021-04-18 | End: 2021-04-18 | Stop reason: SDUPTHER

## 2021-04-18 RX ORDER — SODIUM CHLORIDE 0.9 % (FLUSH) 0.9 %
10 SYRINGE (ML) INJECTION EVERY 12 HOURS SCHEDULED
Status: DISCONTINUED | OUTPATIENT
Start: 2021-04-18 | End: 2021-04-18 | Stop reason: SDUPTHER

## 2021-04-18 RX ADMIN — CEFAZOLIN SODIUM 2000 MG: 2 SOLUTION INTRAVENOUS at 19:07

## 2021-04-18 RX ADMIN — DULOXETINE HYDROCHLORIDE 60 MG: 60 CAPSULE, DELAYED RELEASE ORAL at 14:07

## 2021-04-18 RX ADMIN — HYDROMORPHONE HYDROCHLORIDE 0.25 MG: 1 INJECTION, SOLUTION INTRAMUSCULAR; INTRAVENOUS; SUBCUTANEOUS at 15:26

## 2021-04-18 RX ADMIN — ASPIRIN 325 MG: 325 TABLET, COATED ORAL at 14:07

## 2021-04-18 RX ADMIN — DOCUSATE SODIUM 50 MG AND SENNOSIDES 8.6 MG 1 TABLET: 8.6; 5 TABLET, FILM COATED ORAL at 14:08

## 2021-04-18 RX ADMIN — LISINOPRIL AND HYDROCHLOROTHIAZIDE 1 TABLET: 25; 20 TABLET ORAL at 14:08

## 2021-04-18 RX ADMIN — NEOSTIGMINE METHYLSULFATE 2 MG: 1 INJECTION INTRAVENOUS at 11:09

## 2021-04-18 RX ADMIN — ACETAMINOPHEN 650 MG: 325 TABLET ORAL at 20:07

## 2021-04-18 RX ADMIN — FENTANYL CITRATE 25 MCG: 50 INJECTION, SOLUTION INTRAMUSCULAR; INTRAVENOUS at 12:14

## 2021-04-18 RX ADMIN — CEFAZOLIN SODIUM 2000 MG: 1 INJECTION, POWDER, FOR SOLUTION INTRAMUSCULAR; INTRAVENOUS at 08:27

## 2021-04-18 RX ADMIN — Medication 5 ML: at 20:08

## 2021-04-18 RX ADMIN — GABAPENTIN 400 MG: 400 CAPSULE ORAL at 14:07

## 2021-04-18 RX ADMIN — PHENYLEPHRINE HYDROCHLORIDE 100 MCG: 10 INJECTION, SOLUTION INTRAMUSCULAR; INTRAVENOUS; SUBCUTANEOUS at 10:17

## 2021-04-18 RX ADMIN — ASPIRIN 325 MG: 325 TABLET, COATED ORAL at 20:06

## 2021-04-18 RX ADMIN — GABAPENTIN 400 MG: 400 CAPSULE ORAL at 20:07

## 2021-04-18 RX ADMIN — ALBUMIN (HUMAN) 250 ML: 12.5 INJECTION, SOLUTION INTRAVENOUS at 10:12

## 2021-04-18 RX ADMIN — OXYCODONE HYDROCHLORIDE 5 MG: 5 TABLET ORAL at 14:07

## 2021-04-18 RX ADMIN — LIDOCAINE HYDROCHLORIDE 100 MG: 20 INJECTION, SOLUTION INTRAVENOUS at 08:38

## 2021-04-18 RX ADMIN — ROCURONIUM BROMIDE 50 MG: 10 INJECTION INTRAVENOUS at 08:38

## 2021-04-18 RX ADMIN — GLYCOPYRROLATE 0.4 MG: 0.2 INJECTION INTRAMUSCULAR; INTRAVENOUS at 11:06

## 2021-04-18 RX ADMIN — PROPOFOL 200 MG: 10 INJECTION, EMULSION INTRAVENOUS at 08:38

## 2021-04-18 RX ADMIN — ACETAMINOPHEN 650 MG: 325 TABLET ORAL at 14:07

## 2021-04-18 RX ADMIN — OXYCODONE HYDROCHLORIDE 5 MG: 5 TABLET ORAL at 20:13

## 2021-04-18 RX ADMIN — SODIUM CHLORIDE: 9 INJECTION, SOLUTION INTRAVENOUS at 08:13

## 2021-04-18 RX ADMIN — HYDROMORPHONE HYDROCHLORIDE 0.5 MG: 1 INJECTION, SOLUTION INTRAMUSCULAR; INTRAVENOUS; SUBCUTANEOUS at 18:35

## 2021-04-18 RX ADMIN — PHENYLEPHRINE HYDROCHLORIDE 100 MCG: 10 INJECTION, SOLUTION INTRAMUSCULAR; INTRAVENOUS; SUBCUTANEOUS at 10:20

## 2021-04-18 RX ADMIN — MIDAZOLAM HYDROCHLORIDE 2 MG: 2 INJECTION, SOLUTION INTRAMUSCULAR; INTRAVENOUS at 08:13

## 2021-04-18 RX ADMIN — ONDANSETRON 4 MG: 2 INJECTION INTRAMUSCULAR; INTRAVENOUS at 08:50

## 2021-04-18 RX ADMIN — PHENYLEPHRINE HYDROCHLORIDE 100 MCG: 10 INJECTION, SOLUTION INTRAMUSCULAR; INTRAVENOUS; SUBCUTANEOUS at 10:16

## 2021-04-18 RX ADMIN — ALBUMIN (HUMAN) 250 ML: 12.5 INJECTION, SOLUTION INTRAVENOUS at 10:00

## 2021-04-18 RX ADMIN — HYDROMORPHONE HYDROCHLORIDE 0.5 MG: 1 INJECTION, SOLUTION INTRAMUSCULAR; INTRAVENOUS; SUBCUTANEOUS at 21:37

## 2021-04-18 RX ADMIN — FENTANYL CITRATE 100 MCG: 50 INJECTION, SOLUTION INTRAMUSCULAR; INTRAVENOUS at 08:38

## 2021-04-18 RX ADMIN — SODIUM CHLORIDE: 9 INJECTION, SOLUTION INTRAVENOUS at 10:57

## 2021-04-18 RX ADMIN — FENTANYL CITRATE 25 MCG: 50 INJECTION, SOLUTION INTRAMUSCULAR; INTRAVENOUS at 12:33

## 2021-04-18 RX ADMIN — SODIUM CHLORIDE, SODIUM LACTATE, POTASSIUM CHLORIDE, AND CALCIUM CHLORIDE: .6; .31; .03; .02 INJECTION, SOLUTION INTRAVENOUS at 08:44

## 2021-04-18 RX ADMIN — SODIUM CHLORIDE, SODIUM LACTATE, POTASSIUM CHLORIDE, AND CALCIUM CHLORIDE: .6; .31; .03; .02 INJECTION, SOLUTION INTRAVENOUS at 10:01

## 2021-04-18 RX ADMIN — HYDROMORPHONE HYDROCHLORIDE 2 MG: 1 INJECTION, SOLUTION INTRAMUSCULAR; INTRAVENOUS; SUBCUTANEOUS at 08:48

## 2021-04-18 RX ADMIN — FENTANYL CITRATE 100 MCG: 50 INJECTION, SOLUTION INTRAMUSCULAR; INTRAVENOUS at 08:57

## 2021-04-18 RX ADMIN — DOCUSATE SODIUM 50 MG AND SENNOSIDES 8.6 MG 1 TABLET: 8.6; 5 TABLET, FILM COATED ORAL at 20:07

## 2021-04-18 RX ADMIN — SODIUM CHLORIDE: 450 INJECTION, SOLUTION INTRAVENOUS at 13:16

## 2021-04-18 RX ADMIN — METOPROLOL SUCCINATE 25 MG: 25 TABLET, FILM COATED, EXTENDED RELEASE ORAL at 20:07

## 2021-04-18 RX ADMIN — IRON SUCROSE 200 MG: 20 INJECTION, SOLUTION INTRAVENOUS at 17:47

## 2021-04-18 RX ADMIN — VANCOMYCIN HYDROCHLORIDE 1000 MG: 1 INJECTION, POWDER, LYOPHILIZED, FOR SOLUTION INTRAVENOUS at 08:37

## 2021-04-18 ASSESSMENT — PULMONARY FUNCTION TESTS
PIF_VALUE: 21
PIF_VALUE: 22
PIF_VALUE: 23
PIF_VALUE: 12
PIF_VALUE: 26
PIF_VALUE: 35
PIF_VALUE: 27
PIF_VALUE: 21
PIF_VALUE: 24
PIF_VALUE: 26
PIF_VALUE: 21
PIF_VALUE: 12
PIF_VALUE: 30
PIF_VALUE: 3
PIF_VALUE: 22
PIF_VALUE: 2
PIF_VALUE: 21
PIF_VALUE: 16
PIF_VALUE: 35
PIF_VALUE: 30
PIF_VALUE: 22
PIF_VALUE: 21
PIF_VALUE: 23
PIF_VALUE: 26
PIF_VALUE: 27
PIF_VALUE: 21
PIF_VALUE: 21
PIF_VALUE: 35
PIF_VALUE: 1
PIF_VALUE: 16
PIF_VALUE: 22
PIF_VALUE: 22
PIF_VALUE: 3
PIF_VALUE: 22
PIF_VALUE: 22
PIF_VALUE: 12
PIF_VALUE: 35
PIF_VALUE: 25
PIF_VALUE: 22
PIF_VALUE: 1
PIF_VALUE: 21
PIF_VALUE: 21
PIF_VALUE: 31
PIF_VALUE: 26
PIF_VALUE: 24
PIF_VALUE: 17
PIF_VALUE: 25
PIF_VALUE: 22
PIF_VALUE: 23
PIF_VALUE: 22
PIF_VALUE: 12
PIF_VALUE: 26
PIF_VALUE: 33
PIF_VALUE: 31
PIF_VALUE: 35
PIF_VALUE: 22
PIF_VALUE: 25
PIF_VALUE: 35
PIF_VALUE: 23
PIF_VALUE: 21
PIF_VALUE: 35
PIF_VALUE: 17
PIF_VALUE: 22
PIF_VALUE: 35
PIF_VALUE: 25
PIF_VALUE: 30
PIF_VALUE: 30
PIF_VALUE: 21
PIF_VALUE: 12
PIF_VALUE: 22
PIF_VALUE: 22
PIF_VALUE: 1
PIF_VALUE: 23
PIF_VALUE: 22
PIF_VALUE: 22
PIF_VALUE: 29
PIF_VALUE: 21
PIF_VALUE: 17
PIF_VALUE: 21
PIF_VALUE: 17
PIF_VALUE: 23
PIF_VALUE: 34
PIF_VALUE: 22
PIF_VALUE: 22
PIF_VALUE: 24
PIF_VALUE: 35
PIF_VALUE: 21
PIF_VALUE: 6
PIF_VALUE: 22
PIF_VALUE: 22
PIF_VALUE: 32
PIF_VALUE: 23
PIF_VALUE: 23
PIF_VALUE: 28
PIF_VALUE: 28
PIF_VALUE: 22
PIF_VALUE: 24
PIF_VALUE: 26
PIF_VALUE: 23
PIF_VALUE: 23
PIF_VALUE: 31
PIF_VALUE: 31
PIF_VALUE: 9
PIF_VALUE: 30
PIF_VALUE: 33

## 2021-04-18 ASSESSMENT — PAIN DESCRIPTION - PROGRESSION
CLINICAL_PROGRESSION: GRADUALLY WORSENING

## 2021-04-18 ASSESSMENT — PAIN DESCRIPTION - DESCRIPTORS
DESCRIPTORS: THROBBING
DESCRIPTORS: ACHING
DESCRIPTORS: THROBBING

## 2021-04-18 ASSESSMENT — PAIN SCALES - GENERAL
PAINLEVEL_OUTOF10: 6
PAINLEVEL_OUTOF10: 0
PAINLEVEL_OUTOF10: 0
PAINLEVEL_OUTOF10: 5
PAINLEVEL_OUTOF10: 5
PAINLEVEL_OUTOF10: 7
PAINLEVEL_OUTOF10: 3
PAINLEVEL_OUTOF10: 5
PAINLEVEL_OUTOF10: 7
PAINLEVEL_OUTOF10: 4
PAINLEVEL_OUTOF10: 5
PAINLEVEL_OUTOF10: 6
PAINLEVEL_OUTOF10: 5

## 2021-04-18 ASSESSMENT — PAIN DESCRIPTION - PAIN TYPE
TYPE: SURGICAL PAIN
TYPE: ACUTE PAIN;SURGICAL PAIN

## 2021-04-18 ASSESSMENT — PAIN DESCRIPTION - LOCATION
LOCATION: HIP

## 2021-04-18 ASSESSMENT — PAIN DESCRIPTION - ONSET
ONSET: ON-GOING
ONSET: AWAKENED FROM SLEEP
ONSET: ON-GOING

## 2021-04-18 ASSESSMENT — PAIN DESCRIPTION - FREQUENCY
FREQUENCY: CONTINUOUS

## 2021-04-18 ASSESSMENT — PAIN DESCRIPTION - ORIENTATION
ORIENTATION: RIGHT

## 2021-04-18 ASSESSMENT — PAIN - FUNCTIONAL ASSESSMENT
PAIN_FUNCTIONAL_ASSESSMENT: PREVENTS OR INTERFERES WITH MANY ACTIVE NOT PASSIVE ACTIVITIES
PAIN_FUNCTIONAL_ASSESSMENT: PREVENTS OR INTERFERES WITH MANY ACTIVE NOT PASSIVE ACTIVITIES

## 2021-04-18 NOTE — PROGRESS NOTES
4 Eyes Admission Assessment     I agree as the admission nurse that 2 RN's have performed a thorough Head to Toe Skin Assessment on the patient. ALL assessment sites listed below have been assessed on admission. Areas assessed by both nurses:   [x]   Head, Face, and Ears   [x]   Shoulders, Back, and Chest  [x]   Arms, Elbows, and Hands   [x]   Coccyx, Sacrum, and Ischum  [x]   Legs, Feet, and Heels        Does the Patient have Skin Breakdown? No - new bruising noted to L upper calf, no open area.          Lázaro Prevention initiated:  NA   Wound Care Orders initiated:  NA      WOC nurse consulted for Pressure Injury (Stage 3,4, Unstageable, DTI, NWPT, and Complex wounds):  NA      Nurse 1 eSignature: Electronically signed by Vania Aguilera RN on 4/18/21 at 7:06 PM EDT    **SHARE this note so that the co-signing nurse is able to place an eSignature**    Nurse 2 eSignature: Electronically signed by Berry Sandoval RN on 4/18/21 at 7:06 PM EDT

## 2021-04-18 NOTE — PROGRESS NOTES
Hospitalist Progress Note      PCP: Erica Weldon    Date of Admission: 2021    Chief Complaint on Admission: right hip pain    Pt Seen/Examined and Chart Reviewed. Admitting dx dislocation of prosthetic hip joint    SUBJECTIVE/OBJECTIVE:     Patient admitted from home with non traumatic dislocation of right hip prosthetic joint. She went to OR today for revision of right hip. There is no OR report but per notes- she required intra op transfusion. Hgb at 10 now she feels okay- no dyspnea or chest pain. There is a bruise on her left leg in popliteal area. She does not remember trauma to the area. Allergies  Adhesive tape    Medications      Scheduled Meds:   tranexamic acid (CYCLOKAPRON) IVPB  1,000 mg Intravenous Once    ortho mix (with morphine) injection   Injection On Call    acetaminophen  650 mg Oral Q6H    sennosides-docusate sodium  1 tablet Oral BID    ceFAZolin (ANCEF) IVPB  2,000 mg Intravenous Q8H    aspirin  325 mg Oral BID    DULoxetine  60 mg Oral Daily    gabapentin  400 mg Oral BID    lisinopril-hydroCHLOROthiazide  1 tablet Oral Daily    metoprolol succinate  25 mg Oral Nightly    pantoprazole  40 mg Oral QAM AC    sodium chloride flush  5-40 mL Intravenous 2 times per day       Infusions:   sodium chloride      sodium chloride 75 mL/hr at 21 1316    sodium chloride      lactated ringers 100 mL/hr at 21 2340       PRN Meds:  sodium chloride flush, sodium chloride, magnesium hydroxide, oxyCODONE **OR** oxyCODONE, HYDROmorphone **OR** HYDROmorphone, sodium chloride flush, sodium chloride, promethazine **OR** ondansetron, polyethylene glycol, acetaminophen **OR** acetaminophen, morphine    Vitals    TEMPERATURE:  Current - Temp: 97.5 °F (36.4 °C);  Max - Temp  Av.4 °F (36.9 °C)  Min: 84.2 °F (29 °C)  Max: 99 °F (37.2 °C)  RESPIRATIONS RANGE: Resp  Av.2  Min: 0  Max: 24  PULSE RANGE: Pulse  Av.7  Min: 61  Max: 87  BLOOD PRESSURE RANGE: Systolic (45VLL), HOZ:426 , Min:52 , IHB:589   ; Diastolic (41QII), OCN:23, Min:26, Max:103    PULSE OXIMETRY RANGE: SpO2  Av.2 %  Min: 89 %  Max: 100 %  24HR INTAKE/OUTPUT:      Intake/Output Summary (Last 24 hours) at 2021 1632  Last data filed at 2021 1413  Gross per 24 hour   Intake 3309.79 ml   Output 4915 ml   Net -1605.21 ml       Exam:      General appearance: No apparent distress, appears stated age and cooperative. Lungs: Clear to ascultation, bilaterally without Rales/Wheezes/Rhonchi with good respiratory effort. Heart: Regular rate and rhythm with Normal S1/S2 without  murmurs, rubs or gallops, point of maximum impulse non-displaced  Abdomen: Soft, non-tender or non-distended without rigidity or guarding and positive bowel sounds all four quadrants. Extremities: No clubbing, cyanosis, bruising over left popliteal area, right hip covered in dressing hemovac in place  Neurologic: Alert and oriented X 3,   grossly non-focal.  Mental status: Alert, oriented, thought content appropriate. Data    Recent Labs     21  0408 21  0433 21  1218   WBC 10.5 7.8 18.5*   HGB 12.2 12.1 10.1*   HCT 37.2 37.3 30.7*    379 356      Recent Labs     21  2309 21  0408 21  0433   * 136 135*   K 3.7 3.8 4.5   CL 97* 102 102   CO2 21 22 24   PHOS 3.5 4.4 3.9   BUN 9 9 10   CREATININE 0.7 0.6 0.6     No results for input(s): AST, ALT, ALB, BILIDIR, BILITOT, ALKPHOS in the last 72 hours. No results for input(s): INR in the last 72 hours. No results for input(s): CKTOTAL, CKMB, CKMBINDEX, TROPONINI in the last 72 hours.     Consults:     IP CONSULT TO ORTHOPEDIC SURGERY  IP CONSULT TO HOSPITALIST  IP CONSULT TO SOCIAL WORK    Active Hospital Problems    Diagnosis Date Noted    Hip dislocation, right, initial encounter ([facility] [address] [Y44.821V] 2021    Closed dislocation of right hip ([facility] [address] [S73.004A] 2021         ASSESSMENT AND PLAN      Posterior

## 2021-04-18 NOTE — PROGRESS NOTES
PACU Transfer Note    Vitals:    04/18/21 1245   BP: 120/77   Pulse: 84   Resp: 11   Temp: 98.4 °F (36.9 °C)   SpO2: 99% RA       In: 2300 [I.V.:2000]  Out: 2845 [Urine:1335; Drains:10]    Pain assessment:  present - adequately treated   Pain Level: 5    Patient tranported back to [place] via house transport with chart.      4/18/2021 12:57 PM

## 2021-04-18 NOTE — PROGRESS NOTES
Initial assessment completed, VSS, afebrile, pt currently denies having pain, appears to be resting comfortably in bed. Pt a/ox4, peripheral vascular assessment WDL. Pt able to wiggle toes to RLE. POC discussed with pt, questions/concerns addressed at this time, fall px in place ,bed alarm engaged, call light within reach.

## 2021-04-18 NOTE — PROGRESS NOTES
Dr. Jamar Chopra at bedside to speak to patient. Stat H&H results back 10.1/.30.7, Dr. Jamar Chopra aware no new orders. [place] RN updated.

## 2021-04-18 NOTE — ANESTHESIA PRE PROCEDURE
Department of Anesthesiology  Preprocedure Note       Name:  Darrell Adams   Age:  45 y.o.  :  1970                                          MRN:  [id number]         Date:  2021      Surgeon: Ayesha Fernandez):  Ely Pennington MD    Procedure: Procedure(s):  REVISION RIGHT HIP ARTHROPLASTY ANTERIOR APPROACH    Medications prior to admission:   Prior to Admission medications    Medication Sig Start Date End Date Taking? Authorizing Provider   aspirin 325 MG EC tablet Take 1 tablet by mouth 2 times daily 3/2/21  Yes London Mcallister APRN - CNP   metoprolol succinate (TOPROL XL) 25 MG extended release tablet Take 25 mg by mouth nightly 21  Yes Historical Provider, MD   bisacodyl (DULCOLAX) 5 MG EC tablet Take 1 tablet by mouth 2 times daily as needed for Constipation 20  Yes ERICA Garcia   gabapentin (NEURONTIN) 400 MG capsule Take 400 mg by mouth 3 times daily. Yes Historical Provider, MD   omeprazole (PRILOSEC) 20 MG delayed release capsule Take 20 mg by mouth daily   Yes Historical Provider, MD   cetirizine (ZYRTEC) 10 MG tablet Take 10 mg by mouth daily   Yes Historical Provider, MD   cycloSPORINE (RESTASIS) 0.05 % ophthalmic emulsion 1 drop 2 times daily   Yes Historical Provider, MD   lisinopril-hydrochlorothiazide (PRINZIDE;ZESTORETIC) 20-25 MG per tablet Take 1 tablet by mouth daily. Yes Historical Provider, MD   DULoxetine (CYMBALTA) 60 MG capsule Take 60 mg by mouth daily. Yes Historical Provider, MD   Polyethylene Glycol 3350 (MIRALAX PO) Take  by mouth.    Yes Historical Provider, MD   metroNIDAZOLE (FLAGYL) 500 MG tablet Take 500 mg by mouth 2 times daily 21   Historical Provider, MD   tiZANidine (ZANAFLEX) 4 MG tablet Take 1 tablet by mouth every 8 hours as needed (muscle spasm) 20   ERICA Mccollum   vitamin B-12 (CYANOCOBALAMIN) 1000 MCG tablet Take 1,000 mcg by mouth daily    Historical Provider, MD   acyclovir (ZOVIRAX) 200 MG capsule Take 800 mg by mouth daily.     Historical Provider, MD       Current medications:    Current Facility-Administered Medications   Medication Dose Route Frequency Provider Last Rate Last Admin    tranexamic acid (CYKLOKAPRON) 1,000 mg in sodium chloride 0.9 % 50 mL IVPB  1,000 mg Intravenous Once ERICA Hinojosa        ortho mix (with morphine) injection   Injection On Call ERICA Hinojosa        sodium chloride 0.9 % irrigation    Continuous PRN Mayra Escobar MD   1,000 mL at 04/18/21 0754    DULoxetine (CYMBALTA) extended release capsule 60 mg  60 mg Oral Daily Gilbert Andrew MD   60 mg at 04/17/21 0819    gabapentin (NEURONTIN) capsule 400 mg  400 mg Oral BID Gilbert Andrew MD   400 mg at 04/17/21 2049    lisinopril-hydroCHLOROthiazide (PRINZIDE;ZESTORETIC) 20-25 MG per tablet 1 tablet  1 tablet Oral Daily Gilbert Andrew MD   1 tablet at 04/17/21 0819    metoprolol succinate (TOPROL XL) extended release tablet 25 mg  25 mg Oral Nightly Gilbert Andrew MD   25 mg at 04/17/21 2049    pantoprazole (PROTONIX) tablet 40 mg  40 mg Oral QAM AC Gilbert Andrew MD   40 mg at 04/17/21 6082    sodium chloride flush 0.9 % injection 5-40 mL  5-40 mL Intravenous 2 times per day Gilbert Andrew MD   10 mL at 04/17/21 2049    sodium chloride flush 0.9 % injection 5-40 mL  5-40 mL Intravenous PRN Gilbert Andrew MD        0.9 % sodium chloride infusion  25 mL Intravenous PRN Gilbert Andrew MD        enoxaparin (LOVENOX) injection 40 mg  40 mg Subcutaneous Daily Audra Arriaga MD   40 mg at 04/17/21 1025    promethazine (PHENERGAN) tablet 12.5 mg  12.5 mg Oral Q6H PRN Gilbert Andrew MD        Or    ondansetron (ZOFRAN) injection 4 mg  4 mg Intravenous Q6H PRN Audra Jaimes MD        polyethylene glycol (GLYCOLAX) packet 17 g  17 g Oral Daily PRN Gilbert Andrew MD        acetaminophen (TYLENOL) tablet 650 mg  650 mg Oral Q6H PRN Khai Villa MD   650 mg at 04/17/21 8248    Or    acetaminophen (TYLENOL) suppository 650 mg  650 mg Rectal Q6H PRN Khai Villa MD        morphine (PF) injection 2 mg  2 mg Intravenous Q3H PRN Khai Villa MD        lactated ringers infusion   Intravenous Continuous vKng Mahajan  mL/hr at 04/17/21 2340 New Bag at 04/17/21 2340    ceFAZolin (ANCEF) 1,000 mg in dextrose 5 % 50 mL IVPB (mini-bag)  1,000 mg Intravenous On Call to OR Katie Smith MD           Allergies:     Allergies   Allergen Reactions    Adhesive Tape      bandaids  bandaids         Problem List:    Patient Active Problem List   Diagnosis Code    Primary osteoarthritis of right hip M16.11    Status post total hip replacement, right Z96.641    Hypertension I10    Hyperlipidemia E78.5    Thyroid disease E07.9    Right hip pain M25.551    Hip dislocation, right, sequela S73.004S    Morbid obesity with body mass index (BMI) of 40.0 or higher ([facility]) E66.01    Closed dislocation of right hip ([facility]) S73.004A    Hip dislocation, right, initial encounter ([facility] [address] S73.004A       Past Medical History:        Diagnosis Date    Arthritis     Hyperlipidemia     Hypertension     Thyroid disease     was hyperthyroid       Past Surgical History:        Procedure Laterality Date    COLONOSCOPY      FOOT SURGERY Bilateral     3 on each foot    OTHER SURGICAL HISTORY      Spinal stimulator left hip    REVISION TOTAL HIP ARTHROPLASTY Right 2/26/2021    RIGHT HIP CLOSED REDUCTION performed by Katie Smiht MD at [address] Right 8/10/2020    RIGHT TOTAL HIP ARTHROPLASTY ANTERIOR APPROACH performed by Katie Smith MD at [address] Right 3/1/2021    HIP TOTAL ARTHROPLASTY ANTERIOR APPROACH REVISION performed by Katie Smith MD at [address] TYMPANOSTOMY TUBE PLACEMENT Bilateral        Social History:    Social History     Tobacco Use    Drug/Infectious Status (If Applicable):  No results found for: HIV, HEPCAB    COVID-19 Screening (If Applicable):   Lab Results   Component Value Date    COVID19 NOT DETECTED 08/05/2020           Anesthesia Evaluation  Patient summary reviewed and Nursing notes reviewed  Airway: Mallampati: II  TM distance: >3 FB   Neck ROM: full  Mouth opening: > = 3 FB Dental: normal exam         Pulmonary:Negative Pulmonary ROS and normal exam  breath sounds clear to auscultation            Patient did not smoke on day of surgery. Cardiovascular:Negative CV ROS  Exercise tolerance: good (>4 METS),   (+) hypertension: mild,       ECG reviewed  Rhythm: regular  Rate: normal           Beta Blocker:  Not on Beta Blocker         Neuro/Psych:   Negative Neuro/Psych ROS              GI/Hepatic/Renal: Neg GI/Hepatic/Renal ROS            Endo/Other:    (+) hypothyroidism::., .                 Abdominal:       Abdomen: soft. Vascular: negative vascular ROS. Anesthesia Plan      general     ASA 2 - emergent       Induction: intravenous. MIPS: Postoperative opioids intended and Prophylactic antiemetics administered. Anesthetic plan and risks discussed with patient. Use of blood products discussed with patient whom consented to blood products. Plan discussed with attending.     Attending anesthesiologist reviewed and agrees with Pre Eval content              Maritza Lemons DO   4/18/2021

## 2021-04-18 NOTE — PLAN OF CARE
Problem: Falls - Risk of:  Goal: Will remain free from falls  Description: Will remain free from falls  Outcome: Met This Shift  Goal: Absence of physical injury  Description: Absence of physical injury  Outcome: Met This Shift     Problem: Skin Integrity:  Goal: Will show no infection signs and symptoms  Description: Will show no infection signs and symptoms  Outcome: Met This Shift  Goal: Absence of new skin breakdown  Description: Absence of new skin breakdown  Outcome: Met This Shift     Problem: Pain:  Goal: Pain level will decrease  Description: Pain level will decrease  Outcome: Met This Shift  Goal: Control of acute pain  Description: Control of acute pain  Outcome: Ongoing     Problem: Discharge Planning:  Goal: Knowledge of discharge instructions  Description: Knowledge of discharge instructions  Outcome: Met This Shift     Problem: Infection - Surgical Site:  Goal: Signs of wound healing will improve  Description: Signs of wound healing will improve  Outcome: Met This Shift     Problem: Mobility - Impaired:  Goal: Achieve maximum mobility level  Description: Achieve maximum mobility level  Outcome: Met This Shift     Problem: Pain - Acute:  Goal: Pain level will decrease  Description: Pain level will decrease  Outcome: Met This Shift

## 2021-04-18 NOTE — PROGRESS NOTES
Patient arrived to pacu #13 post [address] [facility] by Dr. Cassandra Fatima. Patient is awake and responsive on 4L NC. Hooked up to PACU monitors, VS stable. Per report from Dr. Silvia Calderon EBL was 1500cc and patients BP dropped during the case. Patient received 500ml of albumin and one unit PRBC. Dr. Silvia Calderon has ordered a stat CBC, 2nd unit PRBC on hold until results of CBC obtained. +PP to R foot, push pull intact.

## 2021-04-18 NOTE — H&P
times daily  metoprolol succinate (TOPROL XL) 25 MG extended release tablet, Take 25 mg by mouth nightly  bisacodyl (DULCOLAX) 5 MG EC tablet, Take 1 tablet by mouth 2 times daily as needed for Constipation  gabapentin (NEURONTIN) 400 MG capsule, Take 400 mg by mouth 3 times daily. omeprazole (PRILOSEC) 20 MG delayed release capsule, Take 20 mg by mouth daily  cetirizine (ZYRTEC) 10 MG tablet, Take 10 mg by mouth daily  cycloSPORINE (RESTASIS) 0.05 % ophthalmic emulsion, 1 drop 2 times daily  lisinopril-hydrochlorothiazide (PRINZIDE;ZESTORETIC) 20-25 MG per tablet, Take 1 tablet by mouth daily. DULoxetine (CYMBALTA) 60 MG capsule, Take 60 mg by mouth daily. Polyethylene Glycol 3350 (MIRALAX PO), Take  by mouth.  metroNIDAZOLE (FLAGYL) 500 MG tablet, Take 500 mg by mouth 2 times daily  tiZANidine (ZANAFLEX) 4 MG tablet, Take 1 tablet by mouth every 8 hours as needed (muscle spasm)  vitamin B-12 (CYANOCOBALAMIN) 1000 MCG tablet, Take 1,000 mcg by mouth daily  acyclovir (ZOVIRAX) 200 MG capsule, Take 800 mg by mouth daily. Allergies:  Adhesive tape    Social History:   Patient currently lives with family   Family History:   History reviewed. No pertinent family history. REVIEW OF SYSTEMS:  CONSTITUTIONAL:  negative  EYES:  negative  Review of Systems   Constitutional: Negative for chills and fever. HENT: Negative for nosebleeds. Eyes: Negative for double vision. Cardiovascular: Negative for chest pain. Gastrointestinal: Negative for abdominal pain. Musculoskeletal: Positive for joint pain and myalgias. Skin: Negative for rash. Neurological: Negative for seizures. Psychiatric/Behavioral: Negative for hallucinations.          PHYSICAL EXAM:  VITALS:  /82   Pulse 72   Temp 99 °F (37.2 °C) (Oral)   Resp 18   Ht 5' 4\" (1.626 m)   Wt 227 lb (103 kg)   SpO2 98%   BMI 38.96 kg/m²   CONSTITUTIONAL:  awake, alert, cooperative, no apparent distress, and appears stated age  EYES:  Lids

## 2021-04-18 NOTE — PROGRESS NOTES
Scheduled evening medications administered, see MAR. Pt denies pain. Pt denies other needs at this time. Repositions self in bed. Call light is in reach. Bed alarm is engaged.

## 2021-04-18 NOTE — ANESTHESIA POSTPROCEDURE EVALUATION
Department of Anesthesiology  Postprocedure Note    Patient: Toma Maldonado  MRN: [id number]  YOB: 1970  Date of evaluation: 4/18/2021  Time:  11:59 AM     Procedure Summary     Date: 04/18/21 Room / Location: [facility]    Anesthesia Start: 4244 Anesthesia Stop:     Procedure: [facility] (Right Hip) Diagnosis: (right hip dislocation)    Surgeons: Raiza Kang MD Responsible Provider: Larry Reaves DO    Anesthesia Type: general ASA Status: 2 - Emergent          Anesthesia Type: No value filed. Gladys Phase I:      Gladys Phase II:      Last vitals: Reviewed and per EMR flowsheets.        Anesthesia Post Evaluation    Patient location during evaluation: PACU  Patient participation: complete - patient participated  Level of consciousness: awake  Pain score: 2  Airway patency: patent  Nausea & Vomiting: no nausea and no vomiting  Complications: no  Cardiovascular status: blood pressure returned to baseline  Respiratory status: acceptable

## 2021-04-18 NOTE — PROGRESS NOTES
[facility] is aware that we are waiting for CBC results prior to transfer back to room. Patiens  is waiting up in room.

## 2021-04-18 NOTE — PLAN OF CARE
Problem: Falls - Risk of:  Goal: Will remain free from falls  Description: Will remain free from falls  4/18/2021 0047 by Bernard Mclean RN  Outcome: Ongoing  Pt is a high fall risk. High fall precautions are in place: nonskid socks, yellow blanket, fall wristband, call light in reach, bed is locked and in lowest position, bed alarm engaged. Problem: Skin Integrity:  Goal: Will show no infection signs and symptoms  Description: Will show no infection signs and symptoms  4/18/2021 0047 by Bernard Mclean RN  Outcome: Ongoing   Encourage to turn and reposition q 2 hrs.

## 2021-04-19 LAB
ALBUMIN SERPL-MCNC: 3.4 G/DL (ref 3.4–5)
ANION GAP SERPL CALCULATED.3IONS-SCNC: 9 MMOL/L (ref 3–16)
BASOPHILS ABSOLUTE: 0 K/UL (ref 0–0.2)
BASOPHILS RELATIVE PERCENT: 0.4 %
BLOOD BANK DISPENSE STATUS: NORMAL
BLOOD BANK PRODUCT CODE: NORMAL
BPU ID: NORMAL
BUN BLDV-MCNC: 7 MG/DL (ref 7–20)
CALCIUM SERPL-MCNC: 8.1 MG/DL (ref 8.3–10.6)
CHLORIDE BLD-SCNC: 102 MMOL/L (ref 99–110)
CO2: 24 MMOL/L (ref 21–32)
CREAT SERPL-MCNC: 0.5 MG/DL (ref 0.6–1.1)
DESCRIPTION BLOOD BANK: NORMAL
EOSINOPHILS ABSOLUTE: 0.1 K/UL (ref 0–0.6)
EOSINOPHILS RELATIVE PERCENT: 0.7 %
GFR AFRICAN AMERICAN: >60
GFR NON-AFRICAN AMERICAN: >60
GLUCOSE BLD-MCNC: 130 MG/DL (ref 70–99)
HCT VFR BLD CALC: 24.4 % (ref 36–48)
HEMOGLOBIN: 8.4 G/DL (ref 12–16)
LYMPHOCYTES ABSOLUTE: 1.4 K/UL (ref 1–5.1)
LYMPHOCYTES RELATIVE PERCENT: 13 %
MAGNESIUM: 1.6 MG/DL (ref 1.8–2.4)
MCH RBC QN AUTO: 31 PG (ref 26–34)
MCHC RBC AUTO-ENTMCNC: 34.6 G/DL (ref 31–36)
MCV RBC AUTO: 89.5 FL (ref 80–100)
MONOCYTES ABSOLUTE: 0.9 K/UL (ref 0–1.3)
MONOCYTES RELATIVE PERCENT: 8.2 %
NEUTROPHILS ABSOLUTE: 8.4 K/UL (ref 1.7–7.7)
NEUTROPHILS RELATIVE PERCENT: 77.7 %
PDW BLD-RTO: 15 % (ref 12.4–15.4)
PHOSPHORUS: 3.4 MG/DL (ref 2.5–4.9)
PLATELET # BLD: 286 K/UL (ref 135–450)
PMV BLD AUTO: 7.2 FL (ref 5–10.5)
POTASSIUM SERPL-SCNC: 3.5 MMOL/L (ref 3.5–5.1)
RBC # BLD: 2.72 M/UL (ref 4–5.2)
SODIUM BLD-SCNC: 135 MMOL/L (ref 136–145)
WBC # BLD: 10.8 K/UL (ref 4–11)

## 2021-04-19 PROCEDURE — 6370000000 HC RX 637 (ALT 250 FOR IP): Performed by: PHYSICIAN ASSISTANT

## 2021-04-19 PROCEDURE — 6370000000 HC RX 637 (ALT 250 FOR IP): Performed by: INTERNAL MEDICINE

## 2021-04-19 PROCEDURE — 6370000000 HC RX 637 (ALT 250 FOR IP): Performed by: ORTHOPAEDIC SURGERY

## 2021-04-19 PROCEDURE — 36430 TRANSFUSION BLD/BLD COMPNT: CPT

## 2021-04-19 PROCEDURE — 6360000002 HC RX W HCPCS: Performed by: PHYSICIAN ASSISTANT

## 2021-04-19 PROCEDURE — P9016 RBC LEUKOCYTES REDUCED: HCPCS

## 2021-04-19 PROCEDURE — 97530 THERAPEUTIC ACTIVITIES: CPT

## 2021-04-19 PROCEDURE — 97535 SELF CARE MNGMENT TRAINING: CPT

## 2021-04-19 PROCEDURE — 36415 COLL VENOUS BLD VENIPUNCTURE: CPT

## 2021-04-19 PROCEDURE — 97110 THERAPEUTIC EXERCISES: CPT

## 2021-04-19 PROCEDURE — 97162 PT EVAL MOD COMPLEX 30 MIN: CPT

## 2021-04-19 PROCEDURE — 85025 COMPLETE CBC W/AUTO DIFF WBC: CPT

## 2021-04-19 PROCEDURE — 0SW90JZ REVISION OF SYNTHETIC SUBSTITUTE IN RIGHT HIP JOINT, OPEN APPROACH: ICD-10-PCS | Performed by: ORTHOPAEDIC SURGERY

## 2021-04-19 PROCEDURE — 97166 OT EVAL MOD COMPLEX 45 MIN: CPT

## 2021-04-19 PROCEDURE — 83735 ASSAY OF MAGNESIUM: CPT

## 2021-04-19 PROCEDURE — 2580000003 HC RX 258: Performed by: INTERNAL MEDICINE

## 2021-04-19 PROCEDURE — 1200000000 HC SEMI PRIVATE

## 2021-04-19 PROCEDURE — 97116 GAIT TRAINING THERAPY: CPT

## 2021-04-19 PROCEDURE — 80069 RENAL FUNCTION PANEL: CPT

## 2021-04-19 RX ORDER — SODIUM CHLORIDE 9 MG/ML
INJECTION, SOLUTION INTRAVENOUS PRN
Status: DISCONTINUED | OUTPATIENT
Start: 2021-04-19 | End: 2021-04-20 | Stop reason: HOSPADM

## 2021-04-19 RX ORDER — GABAPENTIN 400 MG/1
400 CAPSULE ORAL 3 TIMES DAILY
Status: DISCONTINUED | OUTPATIENT
Start: 2021-04-19 | End: 2021-04-20 | Stop reason: HOSPADM

## 2021-04-19 RX ADMIN — GABAPENTIN 400 MG: 400 CAPSULE ORAL at 21:01

## 2021-04-19 RX ADMIN — ACETAMINOPHEN 650 MG: 325 TABLET ORAL at 21:01

## 2021-04-19 RX ADMIN — OXYCODONE HYDROCHLORIDE 5 MG: 5 TABLET ORAL at 00:28

## 2021-04-19 RX ADMIN — ACETAMINOPHEN 650 MG: 325 TABLET ORAL at 08:18

## 2021-04-19 RX ADMIN — ASPIRIN 325 MG: 325 TABLET, COATED ORAL at 08:19

## 2021-04-19 RX ADMIN — DULOXETINE HYDROCHLORIDE 60 MG: 60 CAPSULE, DELAYED RELEASE ORAL at 08:20

## 2021-04-19 RX ADMIN — HYDROMORPHONE HYDROCHLORIDE 0.5 MG: 1 INJECTION, SOLUTION INTRAMUSCULAR; INTRAVENOUS; SUBCUTANEOUS at 01:31

## 2021-04-19 RX ADMIN — DOCUSATE SODIUM 50 MG AND SENNOSIDES 8.6 MG 1 TABLET: 8.6; 5 TABLET, FILM COATED ORAL at 08:18

## 2021-04-19 RX ADMIN — CEFAZOLIN SODIUM 2000 MG: 2 SOLUTION INTRAVENOUS at 00:28

## 2021-04-19 RX ADMIN — Medication 10 ML: at 21:04

## 2021-04-19 RX ADMIN — ASPIRIN 325 MG: 325 TABLET, COATED ORAL at 21:01

## 2021-04-19 RX ADMIN — LISINOPRIL AND HYDROCHLOROTHIAZIDE 1 TABLET: 25; 20 TABLET ORAL at 08:19

## 2021-04-19 RX ADMIN — HYDROMORPHONE HYDROCHLORIDE 0.25 MG: 1 INJECTION, SOLUTION INTRAMUSCULAR; INTRAVENOUS; SUBCUTANEOUS at 19:15

## 2021-04-19 RX ADMIN — ACETAMINOPHEN 650 MG: 325 TABLET ORAL at 12:52

## 2021-04-19 RX ADMIN — GABAPENTIN 400 MG: 400 CAPSULE ORAL at 08:19

## 2021-04-19 RX ADMIN — Medication 10 ML: at 08:42

## 2021-04-19 RX ADMIN — DOCUSATE SODIUM 50 MG AND SENNOSIDES 8.6 MG 1 TABLET: 8.6; 5 TABLET, FILM COATED ORAL at 21:01

## 2021-04-19 RX ADMIN — ACETAMINOPHEN 650 MG: 325 TABLET ORAL at 01:32

## 2021-04-19 RX ADMIN — PANTOPRAZOLE SODIUM 40 MG: 40 TABLET, DELAYED RELEASE ORAL at 08:23

## 2021-04-19 RX ADMIN — OXYCODONE 10 MG: 5 TABLET ORAL at 17:38

## 2021-04-19 RX ADMIN — HYDROMORPHONE HYDROCHLORIDE 0.5 MG: 1 INJECTION, SOLUTION INTRAMUSCULAR; INTRAVENOUS; SUBCUTANEOUS at 07:07

## 2021-04-19 RX ADMIN — OXYCODONE HYDROCHLORIDE 5 MG: 5 TABLET ORAL at 04:28

## 2021-04-19 RX ADMIN — OXYCODONE HYDROCHLORIDE 5 MG: 5 TABLET ORAL at 12:52

## 2021-04-19 ASSESSMENT — PAIN SCALES - GENERAL
PAINLEVEL_OUTOF10: 0
PAINLEVEL_OUTOF10: 7
PAINLEVEL_OUTOF10: 5
PAINLEVEL_OUTOF10: 3
PAINLEVEL_OUTOF10: 5
PAINLEVEL_OUTOF10: 3
PAINLEVEL_OUTOF10: 4
PAINLEVEL_OUTOF10: 5
PAINLEVEL_OUTOF10: 7
PAINLEVEL_OUTOF10: 7
PAINLEVEL_OUTOF10: 5
PAINLEVEL_OUTOF10: 0
PAINLEVEL_OUTOF10: 6
PAINLEVEL_OUTOF10: 0
PAINLEVEL_OUTOF10: 4
PAINLEVEL_OUTOF10: 3

## 2021-04-19 ASSESSMENT — PAIN DESCRIPTION - ORIENTATION
ORIENTATION: RIGHT

## 2021-04-19 ASSESSMENT — PAIN DESCRIPTION - LOCATION
LOCATION: HIP

## 2021-04-19 ASSESSMENT — PAIN DESCRIPTION - PAIN TYPE
TYPE: SURGICAL PAIN
TYPE: ACUTE PAIN
TYPE: SURGICAL PAIN

## 2021-04-19 ASSESSMENT — PAIN DESCRIPTION - ONSET
ONSET: ON-GOING

## 2021-04-19 ASSESSMENT — PAIN DESCRIPTION - FREQUENCY
FREQUENCY: CONTINUOUS

## 2021-04-19 ASSESSMENT — PAIN DESCRIPTION - DESCRIPTORS
DESCRIPTORS: THROBBING
DESCRIPTORS: ACHING
DESCRIPTORS: THROBBING
DESCRIPTORS: THROBBING
DESCRIPTORS: BURNING;SHARP
DESCRIPTORS: THROBBING

## 2021-04-19 ASSESSMENT — PAIN DESCRIPTION - PROGRESSION
CLINICAL_PROGRESSION: GRADUALLY WORSENING
CLINICAL_PROGRESSION: NOT CHANGED
CLINICAL_PROGRESSION: GRADUALLY WORSENING
CLINICAL_PROGRESSION: NOT CHANGED
CLINICAL_PROGRESSION: GRADUALLY IMPROVING
CLINICAL_PROGRESSION: GRADUALLY WORSENING

## 2021-04-19 ASSESSMENT — PAIN - FUNCTIONAL ASSESSMENT
PAIN_FUNCTIONAL_ASSESSMENT: PREVENTS OR INTERFERES SOME ACTIVE ACTIVITIES AND ADLS
PAIN_FUNCTIONAL_ASSESSMENT: PREVENTS OR INTERFERES SOME ACTIVE ACTIVITIES AND ADLS
PAIN_FUNCTIONAL_ASSESSMENT: PREVENTS OR INTERFERES WITH MANY ACTIVE NOT PASSIVE ACTIVITIES
PAIN_FUNCTIONAL_ASSESSMENT: PREVENTS OR INTERFERES WITH MANY ACTIVE NOT PASSIVE ACTIVITIES
PAIN_FUNCTIONAL_ASSESSMENT: ACTIVITIES ARE NOT PREVENTED

## 2021-04-19 NOTE — CARE COORDINATION
Case Management Assessment           Initial Evaluation                Date / Time of Evaluation: 4/19/2021 11:51 AM                 Assessment Completed by: Yuval Dul[place] Day    Patient Name: Gabe Adam     YOB: 1970  Diagnosis: Closed dislocation of right hip[facility] [T57.059L]  Hip dislocation, right, initial encounter [facility]) [I28.090P]     Date / Time: 4/16/2021  9:01 PM    Patient Admission Status: Inpatient    If patient is discharged prior to next notation, then this note serves as note for discharge by case management. Current PCP: [address] Patient: No    Chart Reviewed: Yes  Patient/ Family Interviewed: Yes    Initial assessment completed at bedside with: Patient     Hospitalization in the last 30 days: No    Emergency Contacts:  Extended Emergency Contact Information  Primary Emergency Contact: [facility]  Address: [address] [place] [address] Phone: [phone]  Work Phone: [phone]  Mobile Phone: [phone]  Relation: Spouse    Advance Directives:   Code Status: [address]: No    Financial:  Payor: Wanda Crespo / Plan: Maria Dolores Laboy PPO / Product Type: *No Product type* /     Pre-cert required for SNF: Yes    Pharmacy:    [id number] [place] [address], [address] [place] [address]  [address]  Phone: [phone] Fax: [phone]      Potential assistance Purchasing Medications: Potential Assistance Purchasing Medications: No  Does Patient want to participate in local refill/ meds to beds program?: Yes    Meds To Beds General Rules:  1. Can ONLY be done Monday- Friday between 8:30am-5pm  2. Prescription(s) must be in pharmacy by 3pm to be filled same day  3. Copy of patient's insurance/ prescription drug card and patient

## 2021-04-19 NOTE — PROGRESS NOTES
Occupational Therapy   Occupational Therapy Initial Assessment and Treatment Note   Date: 2021   Patient Name: Autumn Hogan  MRN: [id number]     : 1970    Date of Service: 2021    Discharge Recommendations:Yumiko Barreto scored a  on the -[facility] ADL Inpatient form. At this time, no further OT is recommended upon discharge. Recommend patient returns to prior setting with prior services. OT Equipment Recommendations  Equipment Needed: No    Assessment   Performance deficits / Impairments: Decreased functional mobility ; Decreased ADL status  Assessment: Pt from home with spouse. Pt s/p redislocation #3 of R hip prothesis. Pt familiar with post op course. Pt has all needed DME/ AE at home. Will follow as inpt to maxmize functional mobility/ LE ADLs independence. No HHOT indicated at d/c  Treatment Diagnosis: impaired LE ADLs /functional mobility / endurance 2/2 s/p R KYREE -revision  Prognosis: Good  Decision Making: Medium Complexity  OT Education: OT Role;Plan of Care;IADL Safety; ADL Adaptive Strategies  Patient Education: verb understanding - reinforce as needed  REQUIRES OT FOLLOW UP: Yes  Activity Tolerance  Activity Tolerance: Patient Tolerated treatment well;Patient limited by pain  Safety Devices  Safety Devices in place: Yes  Type of devices: Call light within reach;Nurse notified; Left in chair;Chair alarm in place           Patient Diagnosis(es): The encounter diagnosis was Dislocation of internal right hip prosthesis, initial encounter ([facility] has a past medical history of Arthritis, Hyperlipidemia, Hypertension, and Thyroid disease. has a past surgical history that includes Foot surgery (Bilateral); Tympanostomy tube placement (Bilateral); Colonoscopy; other surgical history; Total hip arthroplasty (Right, 8/10/2020); Revision total hip arthroplasty (Right, 2021); Total hip arthroplasty (Right, 3/1/2021); and Total hip arthroplasty (Right, 2021). Treatment Diagnosis: impaired LE ADLs /functional mobility / endurance 2/2 s/p R KYREE -revision      Restrictions  Position Activity Restriction  Other position/activity restrictions: Progressive activity day of surgery 4/18, FWBAT RLE, up as tolerated   - Will follow posterior precautions from prior sx 3/1/2021- per pt     Subjective   General  Chart Reviewed: Yes  Additional Pertinent Hx: Admit 4/16 with Closed Dislocation of  R Hip  xray - Dislocation of femoral head component of right total hip arthroplasty, ORtho consult. Failed manual reduction in ED  to OR 4/18 s/p REVISION RIGHT HIP ARTHROPLASTY ANTERIOR APPROACH                                                           PMHX:   R YKREE 3/01/ 2021- HTN, HLD, OA  Family / Caregiver Present: No  Diagnosis: Closed R hip dislocation. Subjective  Subjective: \" I feel pretty good \" pt in found in bed -- Reports Friday her R hip felt funny all day and progressively got worse. Agreeable for OOB  Patient Currently in Pain: Yes(1/10 -- R hip)  Vital Signs  Patient Currently in Pain: Yes(1/10 -- R hip)    Social/Functional History  Social/Functional History  Lives With: Spouse  Type of Home: House  Home Layout: Two level, Bed/Bath upstairs  Home Access: Stairs to enter without rails  Entrance Stairs - Number of Steps: 2  Bathroom Shower/Tub: Walk-in shower  Bathroom Toilet: Handicap height(sink for leverage)  Bathroom Equipment: Shower chair, Grab bars around toilet  Home Equipment: Cane, Crutches, Sock aid, Long-handled shoehorn, Reacher  ADL Assistance: Independent(was using AE - reacher / sock / aid and LHS)  Homemaking Assistance: Independent(spouse assisting if needed)  Ambulation Assistance: Independent  Transfer Assistance: Independent  Active : Yes  Occupation: Full time employment       Objective  Treatment included functional transfer training, ADL's and pt. education.     Vision: Within Functional Limits(reading glasses)  Hearing: Within functional limits    Orientation  Overall Orientation Status: Within Functional Limits     Balance  Sitting Balance: Supervision  Standing Balance: Stand by assistance(progressed to supervision)  Standing Balance  Time: 3 mins x 2 and 4 min x 1  Activity: functional transfers/ stance at sink for grooming / functional mobility in room / bathroom  Functional Mobility  Functional - Mobility Device: Rolling Walker  Activity: To/from bathroom; Other  Assist Level: Stand by assistance(progressed to supervision)  Toilet Transfers  Toilet - Technique: Ambulating  Equipment Used: Raised toilet seat with rails  Toilet Transfer: Stand by assistance;Contact guard assistance  Toilet Transfers Comments: has RTS and sink for home use  Tub Transfers  Tub Transfers Comments: Pt reports has shower chair if needed and verb tech for transfers. Pt edu on having spouse assist at d/c -- verb understanding  ADL  Feeding: Independent  Grooming: Independent  LE Dressing:  Moderate assistance(pt familiar with AE - reacher / sock aid)  Toileting: Minimal assistance;Contact guard assistance(steadying assist in stance -- simulated 2/2 unable to wear brief 2/2 drain)  Tone RUE  RUE Tone: Normotonic  Tone LUE  LUE Tone: Normotonic  Coordination  Movements Are Fluid And Coordinated: Yes     Bed mobility  Supine to Sit: Stand by assistance(with HOB at 30)  Scooting: Supervision  Transfers  Sit to stand: Contact guard assistance  Stand to sit: Contact guard assistance  Transfer Comments: v.cues for hand placement  Vision - Basic Assessment  Prior Vision: Wears glasses only for reading  Cognition  Overall Cognitive Status: WFL     Sensation  Overall Sensation Status: WFL     Hand Dominance  Hand Dominance: Right     Plan   Plan  Times per week: 5-7x  Times per day: Daily  Current Treatment Recommendations: Functional Mobility Training, Safety Education & Training, Self-Care / ADL, Patient/Caregiver Education & Training    AM-PAC Score  AM-PAC Inpatient Daily Activity Raw Score: 19 (04/19/21 1036)  AM-PAC Inpatient ADL T-Scale Score : 40.22 (04/19/21 1036)  ADL Inpatient CMS 0-100% Score: 42.8 (04/19/21 1036)  ADL Inpatient CMS G-Code Modifier : CK (04/19/21 1036)    Goals  Short term goals  Time Frame for Short term goals: at d/c  Short term goal 1: Stance x 6 mins with supervision  Short term goal 2: Commode transfers with Mod independence  Short term goal 3: LE Dressing with supervision and AE prn  Patient Goals   Patient goals : Go Home / Be independent     Therapy Time   Individual Concurrent Group Co-treatment   Time In 0905         Time Out 0945         Minutes 40              Timed Code Treatment Minutes:  24 mins     Total Treatment Minutes:  40 mins       Chanelle Jewell OT

## 2021-04-19 NOTE — PROGRESS NOTES
Physician Progress Note      Demetrio Collins  CSN #:                  [id number]  :                       1970  ADMIT DATE:       2021 9:01 PM  [address] [place] DATE:  RESPONDING  PROVIDER #:        Dre White MD          QUERY TEXT:    Dear Provider    Pt underwent a right hip revision for dislocation of right hip prothesis and   has anemia documented. If possible, please document in progress notes and   discharge summary further specificity regarding the acuity and type of anemia:    The medical record reflects the following:  Risk Factors: right hip revision  Clinical Indicators: Pre op Hgb 13.5/Hct 40.0 with trend down to 8.4/Hct 24.4     Per I & O: 1500 ml blood out.  documentation of \"There is no OR report- she required intra op   transfusion. \" \"Anemia: Monitor counts daily. S/p 1 U intra op. Give venofer. \"  Treatment: 1 U PRBC, Venofer, Daily CBC, Monitor for bleeding  Options provided:  -- Anemia due to acute blood loss  -- Anemia due to postoperative blood loss  -- Other - I will add my own diagnosis  -- Disagree - Not applicable / Not valid  -- Disagree - Clinically unable to determine / Unknown  -- Refer to Clinical Documentation Reviewer    PROVIDER RESPONSE TEXT:    This patient has acute blood loss anemia.     Query created by: Angeli Garcia on 2021 8:19 AM      Electronically signed by:  Dre White MD 2021 8:36 AM

## 2021-04-19 NOTE — PROGRESS NOTES
Scheduled evening medications administered, see MAR. Pt reporting continued R hip pain from surgery, PRN oxycodone administered per MD orders. Repositions self in bed. Ice therapy in place to R hip. Pt denies other needs at this time. Call light is in reach. Bed alarm is engaged.

## 2021-04-19 NOTE — PROGRESS NOTES
Hospitalist Progress Note      PCP: Jesús Pierre    Date of Admission: 2021    Chief Complaint on Admission: right hip pain    Pt Seen/Examined and Chart Reviewed. Admitting dx dislocation of prosthetic hip joint    SUBJECTIVE:  Reports a stinging/ burning pain at the site of the drain. No hip pain as such. No cp or sob. No dizziness. Allergies  Adhesive tape    Medications      Scheduled Meds:   tranexamic acid (CYCLOKAPRON) IVPB  1,000 mg Intravenous Once    acetaminophen  650 mg Oral Q6H    sennosides-docusate sodium  1 tablet Oral BID    aspirin  325 mg Oral BID    DULoxetine  60 mg Oral Daily    gabapentin  400 mg Oral BID    lisinopril-hydroCHLOROthiazide  1 tablet Oral Daily    metoprolol succinate  25 mg Oral Nightly    pantoprazole  40 mg Oral QAM AC    sodium chloride flush  5-40 mL Intravenous 2 times per day       Infusions:   sodium chloride      sodium chloride         PRN Meds:  sodium chloride flush, sodium chloride, magnesium hydroxide, oxyCODONE **OR** oxyCODONE, HYDROmorphone **OR** HYDROmorphone, sodium chloride flush, sodium chloride, promethazine **OR** ondansetron, polyethylene glycol, acetaminophen **OR** acetaminophen, morphine    Vitals    TEMPERATURE:  Current - Temp: 98.3 °F (36.8 °C);  Max - Temp  Av.2 °F (36.8 °C)  Min: 97.7 °F (36.5 °C)  Max: 98.7 °F (37.1 °C)  RESPIRATIONS RANGE: Resp  Av.3  Min: 14  Max: 18  PULSE RANGE: Pulse  Av  Min: 91  Max: 102  BLOOD PRESSURE RANGE:  Systolic (38AMV), WYI:252 , Min:117 , NWZ:947   ; Diastolic (03DCB), KAYLAH:10, Min:68, Max:85    117/79                       SK   Pulse Lying          90 PER MINUTE             SK   Blood Pressure Sitting          108/64             SK   Pulse Sitting          102 PER MINUTE             SK   Blood Pressure Standing          92/60           PULSE OXIMETRY RANGE: SpO2  Av.7 %  Min: 90 %  Max: 100 %  24HR INTAKE/OUTPUT:      Intake/Output Summary Armida Apley, MD

## 2021-04-19 NOTE — PLAN OF CARE
Problem: Falls - Risk of:  Goal: Will remain free from falls  Description: Will remain free from falls  4/19/2021 0052 by Cornelia Smiley RN  Outcome: Ongoing   Pt is a high fall risk. High fall precautions are in place: nonskid socks, yellow blanket, fall wristband, call light in reach, bed is locked and in lowest position, bed alarm engaged. Problem: Skin Integrity:  Goal: Will show no infection signs and symptoms  Description: Will show no infection signs and symptoms  4/19/2021 0052 by Cornelia Smiley RN  Outcome: Ongoing  Turn and reposition q 2 hrs.

## 2021-04-19 NOTE — PROGRESS NOTES
Physical Therapy    Facility/Department: [facility] 5T ORTHO/NEURO  Initial Assessment/Treatment    NAME: Darrell Adams  : 1970  MRN: [id number]    Date of Service: 2021    Discharge Recommendations:    Darrell Adams scored a 18/24 on the AM-PAC short mobility form. Current research shows that an AM-PAC score of 17 or less is typically not associated with a discharge to the patient's home setting. Based on the patient's AM-PAC score and their current functional mobility deficits, it is recommended that the patient have 5-7 sessions per week of Physical Therapy at d/c to increase the patient's independence. At this time, this patient demonstrates the endurance, and/or tolerance for 3 hours of therapy each day, with a treatment frequency of 5-7x/wk. Please see assessment section for further patient specific details. If patient discharges prior to next session this note will serve as a discharge summary. Please see below for the latest assessment towards goals. PT Equipment Recommendations  Equipment Needed: No(Pt has crutches at home)    Assessment   Body structures, Functions, Activity limitations: Decreased functional mobility ; Increased pain;Decreased balance;Decreased strength;Decreased endurance  Assessment: Pt is a 44 y/o female admitted with R hip pain associated with dislocation. Pt educated on posterior hip precautions. Pt currently requiring SBA-supervision for all bed mobility. Pt requiring CGA for all transfers, AMB with B crutches, and stair negotiation with B crutches. Pt limited by R hip pain, decreased balance, and decreased RLE strength. Pt would benefit from skilled therapy to improve safety and independence with all functional mobility. Will follow. Treatment Diagnosis: Impaired functional mobility  Prognosis: Good  Decision Making: Medium Complexity  PT Education: Goals;PT Role;Gait Training;Precautions;Transfer Training;Home Exercise Program;Plan of Care; Functional Mobility Training  Patient Education: Pt educated on him precautions, stair negotiation. Pt verb understanding. Barriers to Learning: none  REQUIRES PT FOLLOW UP: Yes  Activity Tolerance  Activity Tolerance: Patient Tolerated treatment well;Patient limited by pain  Activity Tolerance: Pt limited by pain with R hip wound drain this date. RN made aware. Patient Diagnosis(es): The encounter diagnosis was Dislocation of internal right hip prosthesis, initial encounter ([facility] has a past medical history of Arthritis, Hyperlipidemia, Hypertension, and Thyroid disease. has a past surgical history that includes Foot surgery (Bilateral); Tympanostomy tube placement (Bilateral); Colonoscopy; other surgical history; Total hip arthroplasty (Right, 8/10/2020); Revision total hip arthroplasty (Right, 2/26/2021); Total hip arthroplasty (Right, 3/1/2021); and Total hip arthroplasty (Right, 4/18/2021). Restrictions  Position Activity Restriction  Other position/activity restrictions: Progressive activity day of surgery 4/18, FWBAT RLE, up as tolerated   - Will follow posterior precautions from prior sx 3/1/2021  Vision/Hearing        Subjective  General  Chart Reviewed: Yes  Additional Pertinent Hx: Kang Zambrano is a 45 y.o. female who presents with emergency department after sustaining a dislocation to her right hip on 4/9/21. Family / Caregiver Present: No  Referring Practitioner: ERICA Johnson  Diagnosis: Closed dislocation of R hip  Follows Commands: Within Functional Limits  Subjective  Subjective: Pt found sitting in recliner agreeable to therapy reporting \"bee sting like pain with WB\" reporting recent pain medication.   Pain Screening  Patient Currently in Pain: No          Orientation  Orientation  Overall Orientation Status: Within Normal Limits  Social/Functional History  Social/Functional History  Lives With: Spouse  Type of Home: House  Home Layout: Two level, Bed/Bath upstairs  Home Access: Stairs to enter without rails  Entrance Stairs - Number of Steps: 2  Bathroom Shower/Tub: Walk-in shower  Bathroom Toilet: Handicap height(sink for leverage)  Bathroom Equipment: Shower chair, Grab bars around toilet  Home Equipment: Jorge L Gilbert, Crutches  ADL Assistance: Independent(was using AE - reacher / sock / aid and LHS)  Homemaking Assistance: Independent(spouse assisting if needed)  Ambulation Assistance: Independent  Transfer Assistance: Independent  Active : Yes  Occupation: Full time employment  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 Cognition   Cognition  Overall Cognitive Status: WNL    Objective  AROM RLE (degrees)  RLE AROM: WFL  AROM LLE (degrees)  LLE AROM : WFL  Strength RLE  Comment: >3+/5 though ability to achieve functional stand  Strength LLE  Strength LLE: WFL        Bed mobility  Sit to Supine: Stand by assistance  Scooting: Supervision  Comment: Min VC for posterior hip precautions with bed mobility. Transfers  Sit to Stand: Contact guard assistance(From recliner)  Stand to sit: Contact guard assistance(To EOB)  Comment: Min VC/TC for sequencing to place RLE anterior to LLE  Ambulation  Ambulation?: Yes  Ambulation 1  Surface: level tile  Device: Axillary Crutches  Assistance: Contact guard assistance  Quality of Gait: Step-to pattern, decreased grayson, pain with RLE WB, good posture. Distance: 250'  Comments: Pt with slight unsteadiness but no overt LOB 2/2 to discomfort with R hip wound drain. Stairs/Curb  Stairs?: Yes  Stairs  # Steps : 2  Stairs Height: 6\"  Rails: None  Device: Crutches  Assistance: Contact guard assistance  Comment: Educated/expressed understanding with step-to acend LLE leading, descending crutches then LLE leading. Pt with good slight unsteadiness but no overt LOB.      Balance  Posture: Good  Sitting - Static: Good  Sitting - Dynamic: Good  Standing - Static: Good  Standing - Dynamic: Fair  Exercises  Knee Long Arc Quad: 1x10 Bilateral     Plan   Plan  Times per week: 5-7  Times per day: Daily  Current Treatment Recommendations: Strengthening, Balance Training, Endurance Training, Functional Mobility Training, Transfer Training, Gait Training, Stair training, Pain Management, Patient/Caregiver Education & Training, Safety Education & Training, Home Exercise Program  Safety Devices  Type of devices: Bed alarm in place, Nurse notified, Call light within reach, Gait belt, Left in bed    AM-PAC Score  AM-PAC Inpatient Mobility Raw Score : 18 (04/19/21 1153)  AM-PAC Inpatient T-Scale Score : 43.63 (04/19/21 1153)  Mobility Inpatient CMS 0-100% Score: 46.58 (04/19/21 1153)  Mobility Inpatient CMS G-Code Modifier : CK (04/19/21 1153)          Goals  Short term goals  Time Frame for Short term goals: DC  Short term goal 1: Supine<>sit with supervision  Short term goal 2: Sit<>stand with SBA  Short term goal 3: Pt will ' with LRAD and CGA  Short term goal 4: Pt will ascend descend 2 steps with B crutches with SBA  Patient Goals   Patient goals : To return home       Therapy Time   Individual Concurrent Group Co-treatment   Time In 1058         Time Out 1136         Minutes 38           Timed Code Treatment Minutes:  23    Total Treatment Minutes:  38     If the patient is discharged before the next treatment session, this note will serve as the discharge summary. Jyoti Aabd, PT, DPT     Therapist was present, directed the patient's care, made skilled judgement and was responsible for assessment and treatment of the patient.

## 2021-04-19 NOTE — PLAN OF CARE
Problem: Falls - Risk of:  Goal: Will remain free from falls  Description: Will remain free from falls. Fall precautions in place. Bed is in lowest position, wheels locked, alarm on, non-skid socks on. Call light and bedside table within reach. Patient calls out appropriately. Patient is up x*1 assist with a walker. Will continue to assess and monitor. 4/19/2021 0746 by Christina Torres RN  Outcome: Ongoing    Problem: Pain:  Goal: Pain level will decrease. Description: Pain level will decrease. Patient will be medicated per MAR.   Outcome: Ongoing

## 2021-04-20 VITALS
BODY MASS INDEX: 38.76 KG/M2 | SYSTOLIC BLOOD PRESSURE: 133 MMHG | OXYGEN SATURATION: 100 % | RESPIRATION RATE: 16 BRPM | HEIGHT: 64 IN | DIASTOLIC BLOOD PRESSURE: 87 MMHG | HEART RATE: 106 BPM | TEMPERATURE: 98.5 F | WEIGHT: 227 LBS

## 2021-04-20 LAB
ALBUMIN SERPL-MCNC: 3.5 G/DL (ref 3.4–5)
ANION GAP SERPL CALCULATED.3IONS-SCNC: 8 MMOL/L (ref 3–16)
BASOPHILS ABSOLUTE: 0.1 K/UL (ref 0–0.2)
BASOPHILS RELATIVE PERCENT: 0.6 %
BLOOD BANK DISPENSE STATUS: NORMAL
BLOOD BANK DISPENSE STATUS: NORMAL
BLOOD BANK PRODUCT CODE: NORMAL
BLOOD BANK PRODUCT CODE: NORMAL
BPU ID: NORMAL
BPU ID: NORMAL
BUN BLDV-MCNC: 7 MG/DL (ref 7–20)
CALCIUM SERPL-MCNC: 8.8 MG/DL (ref 8.3–10.6)
CHLORIDE BLD-SCNC: 100 MMOL/L (ref 99–110)
CO2: 29 MMOL/L (ref 21–32)
CREAT SERPL-MCNC: 0.6 MG/DL (ref 0.6–1.1)
DESCRIPTION BLOOD BANK: NORMAL
DESCRIPTION BLOOD BANK: NORMAL
EOSINOPHILS ABSOLUTE: 0.2 K/UL (ref 0–0.6)
EOSINOPHILS RELATIVE PERCENT: 2.1 %
GFR AFRICAN AMERICAN: >60
GFR NON-AFRICAN AMERICAN: >60
GLUCOSE BLD-MCNC: 120 MG/DL (ref 70–99)
HCT VFR BLD CALC: 27 % (ref 36–48)
HEMOGLOBIN: 9.1 G/DL (ref 12–16)
LYMPHOCYTES ABSOLUTE: 1.9 K/UL (ref 1–5.1)
LYMPHOCYTES RELATIVE PERCENT: 16.8 %
MAGNESIUM: 1.6 MG/DL (ref 1.8–2.4)
MCH RBC QN AUTO: 30.3 PG (ref 26–34)
MCHC RBC AUTO-ENTMCNC: 33.6 G/DL (ref 31–36)
MCV RBC AUTO: 90.1 FL (ref 80–100)
MONOCYTES ABSOLUTE: 1 K/UL (ref 0–1.3)
MONOCYTES RELATIVE PERCENT: 9 %
NEUTROPHILS ABSOLUTE: 8.3 K/UL (ref 1.7–7.7)
NEUTROPHILS RELATIVE PERCENT: 71.5 %
PDW BLD-RTO: 15.6 % (ref 12.4–15.4)
PHOSPHORUS: 3.1 MG/DL (ref 2.5–4.9)
PLATELET # BLD: 285 K/UL (ref 135–450)
PMV BLD AUTO: 7.2 FL (ref 5–10.5)
POTASSIUM SERPL-SCNC: 3.8 MMOL/L (ref 3.5–5.1)
RBC # BLD: 3 M/UL (ref 4–5.2)
SODIUM BLD-SCNC: 137 MMOL/L (ref 136–145)
WBC # BLD: 11.6 K/UL (ref 4–11)

## 2021-04-20 PROCEDURE — 97530 THERAPEUTIC ACTIVITIES: CPT

## 2021-04-20 PROCEDURE — 83735 ASSAY OF MAGNESIUM: CPT

## 2021-04-20 PROCEDURE — 36415 COLL VENOUS BLD VENIPUNCTURE: CPT

## 2021-04-20 PROCEDURE — 85025 COMPLETE CBC W/AUTO DIFF WBC: CPT

## 2021-04-20 PROCEDURE — 97110 THERAPEUTIC EXERCISES: CPT

## 2021-04-20 PROCEDURE — 6370000000 HC RX 637 (ALT 250 FOR IP): Performed by: PHYSICIAN ASSISTANT

## 2021-04-20 PROCEDURE — 80069 RENAL FUNCTION PANEL: CPT

## 2021-04-20 PROCEDURE — 97116 GAIT TRAINING THERAPY: CPT

## 2021-04-20 PROCEDURE — 2580000003 HC RX 258: Performed by: INTERNAL MEDICINE

## 2021-04-20 PROCEDURE — 6370000000 HC RX 637 (ALT 250 FOR IP): Performed by: INTERNAL MEDICINE

## 2021-04-20 PROCEDURE — 6370000000 HC RX 637 (ALT 250 FOR IP): Performed by: ORTHOPAEDIC SURGERY

## 2021-04-20 RX ORDER — OXYCODONE HYDROCHLORIDE 5 MG/1
5 TABLET ORAL EVERY 4 HOURS PRN
Qty: 12 TABLET | Refills: 0 | Status: SHIPPED | OUTPATIENT
Start: 2021-04-20 | End: 2021-04-23

## 2021-04-20 RX ORDER — OXYCODONE HYDROCHLORIDE 5 MG/1
5 TABLET ORAL EVERY 4 HOURS PRN
Qty: 12 TABLET | Refills: 0 | Status: SHIPPED | OUTPATIENT
Start: 2021-04-20 | End: 2021-04-20

## 2021-04-20 RX ADMIN — PANTOPRAZOLE SODIUM 40 MG: 40 TABLET, DELAYED RELEASE ORAL at 06:21

## 2021-04-20 RX ADMIN — Medication 10 ML: at 07:58

## 2021-04-20 RX ADMIN — ASPIRIN 325 MG: 325 TABLET, COATED ORAL at 07:58

## 2021-04-20 RX ADMIN — DOCUSATE SODIUM 50 MG AND SENNOSIDES 8.6 MG 1 TABLET: 8.6; 5 TABLET, FILM COATED ORAL at 07:57

## 2021-04-20 RX ADMIN — DULOXETINE HYDROCHLORIDE 60 MG: 60 CAPSULE, DELAYED RELEASE ORAL at 07:57

## 2021-04-20 RX ADMIN — GABAPENTIN 400 MG: 400 CAPSULE ORAL at 07:58

## 2021-04-20 RX ADMIN — ACETAMINOPHEN 650 MG: 325 TABLET ORAL at 01:08

## 2021-04-20 RX ADMIN — GABAPENTIN 400 MG: 400 CAPSULE ORAL at 15:04

## 2021-04-20 RX ADMIN — OXYCODONE HYDROCHLORIDE 5 MG: 5 TABLET ORAL at 08:06

## 2021-04-20 RX ADMIN — OXYCODONE 10 MG: 5 TABLET ORAL at 15:06

## 2021-04-20 RX ADMIN — ACETAMINOPHEN 650 MG: 325 TABLET ORAL at 06:21

## 2021-04-20 RX ADMIN — ACETAMINOPHEN 650 MG: 325 TABLET ORAL at 15:04

## 2021-04-20 ASSESSMENT — PAIN DESCRIPTION - FREQUENCY
FREQUENCY: CONTINUOUS

## 2021-04-20 ASSESSMENT — PAIN SCALES - GENERAL
PAINLEVEL_OUTOF10: 7
PAINLEVEL_OUTOF10: 4
PAINLEVEL_OUTOF10: 0
PAINLEVEL_OUTOF10: 7

## 2021-04-20 ASSESSMENT — PAIN DESCRIPTION - LOCATION
LOCATION: HIP

## 2021-04-20 ASSESSMENT — PAIN DESCRIPTION - ORIENTATION
ORIENTATION: RIGHT

## 2021-04-20 ASSESSMENT — PAIN DESCRIPTION - ONSET
ONSET: ON-GOING

## 2021-04-20 ASSESSMENT — PAIN DESCRIPTION - PROGRESSION
CLINICAL_PROGRESSION: GRADUALLY IMPROVING

## 2021-04-20 ASSESSMENT — PAIN - FUNCTIONAL ASSESSMENT
PAIN_FUNCTIONAL_ASSESSMENT: PREVENTS OR INTERFERES SOME ACTIVE ACTIVITIES AND ADLS

## 2021-04-20 ASSESSMENT — PAIN DESCRIPTION - PAIN TYPE
TYPE: SURGICAL PAIN

## 2021-04-20 ASSESSMENT — PAIN DESCRIPTION - DESCRIPTORS
DESCRIPTORS: ACHING;DULL
DESCRIPTORS: DULL;ACHING
DESCRIPTORS: ACHING;DULL

## 2021-04-20 NOTE — DISCHARGE INSTR - COC
Continuity of Care Form    Patient Name: Awa Pompa   :  1970  MRN:  [id number]    Admit date:  2021  Discharge date:  2021    Code Status Order: Full Code   Advance Directives:   Advance Care Flowsheet Documentation       Date/Time Healthcare Directive Type of Healthcare Directive Copy in [address] Agent's Name Healthcare Agent's Phone Number    21 [phone]  No, patient does not have an advance directive for healthcare treatment -- -- -- -- --            Admitting Physician:  Elizabeth Fritz MD  PCP: Adolfo Rothman    Discharging Nurse: [facility] [place] Unit/Room#: 7153/2258-38  Discharging Unit Phone Number: [phone]    Emergency Contact:   Extended Emergency Contact Information  Primary Emergency Contact: [facility]  Address: [address] [place] [address]  Home Phone: [phone]  Work Phone: [phone]  Mobile Phone: [phone]  Relation: Spouse    Past Surgical History:  Past Surgical History:   Procedure Laterality Date    COLONOSCOPY      FOOT SURGERY Bilateral     3 on each foot    OTHER SURGICAL HISTORY      Spinal stimulator left hip    REVISION TOTAL HIP ARTHROPLASTY Right 2021    RIGHT HIP CLOSED REDUCTION performed by Melani Costello MD at [address] [place] [address] Right 8/10/2020    RIGHT TOTAL HIP ARTHROPLASTY ANTERIOR APPROACH performed by Melani Costello MD at [address] [place] [address] Right 3/1/2021    HIP TOTAL ARTHROPLASTY ANTERIOR APPROACH REVISION performed by Melani Costello MD at [address] [place] [address] Right 2021    REVISION RIGHT HIP ARTHROPLASTY ANTERIOR APPROACH performed by Melani Costello MD at / [address] Bilateral        Immunization History: There is no immunization history on file for this patient.     Active Problems:  Patient Active Problem List   Diagnosis Code    Primary osteoarthritis of right hip M16.11    Status post total hip replacement, right Z96.641    Hypertension I10    Hyperlipidemia E78.5    Thyroid disease E07.9    Right hip pain M25.551    Hip dislocation, right, sequela S73.004S    Morbid obesity with body mass index (BMI) of 40.0 or higher ([facility]) E66.01    Closed dislocation of right hip ([facility]) S73.004A    Hip dislocation, right, initial encounter ([facility] S73.004A       Isolation/Infection:   Isolation            No Isolation          Patient Infection Status       None to display            Nurse Assessment:  Last Vital Signs: /78   Pulse 102   Temp 98.4 °F (36.9 °C) (Oral)   Resp 16   Ht 5' 4\" (1.626 m)   Wt 227 lb (103 kg)   SpO2 99%   BMI 38.96 kg/m²     Last documented pain score (0-10 scale): Pain Level: 4  Last Weight:   Wt Readings from Last 1 Encounters:   04/16/21 227 lb (103 kg)     Mental Status:  oriented and alert    IV Access:  - None    Nursing Mobility/ADLs:  Walking   Assisted  Transfer  Assisted  Bathing  Assisted  Dressing  Assisted  Toileting  Independent  Feeding  Independent  Med Admin  Independent  Med Delivery   whole    Wound Care Documentation and Therapy:        Elimination:  Continence:   · Bowel: Yes  · Bladder: Yes  Urinary Catheter: None   Colostomy/Ileostomy/Ileal Conduit: No       Date of Last BM: prior to admission    Intake/Output Summary (Last 24 hours) at 4/20/2021 0912  Last data filed at 4/20/2021 0809  Gross per 24 hour   Intake 1020 ml   Output 905 ml   Net 115 ml     I/O last 3 completed shifts: In: 56 [P.O.:720; Blood:300]  Out: 5591 [Urine:3150; Drains:95]    Safety Concerns:      At Risk for Falls    Impairments/Disabilities:      None    Nutrition Therapy:  Current Nutrition Therapy:   - Oral Diet:  General    Routes of Feeding: Oral  Liquids: No Restrictions  Daily Fluid Restriction: no  Last Modified Barium Swallow with Video (Video Swallowing Test): not done    Treatments at the Time of Hospital Discharge:   Respiratory Treatments: none  Oxygen Therapy:  is not on home oxygen therapy. Ventilator:    - No ventilator support    Rehab Therapies: Physical Therapy and Occupational Therapy  Weight Bearing Status/Restrictions: No weight bearing restirctions  Other Medical Equipment (for information only, NOT a DME order):  crutches  Other Treatments: none    Patient's personal belongings (please select all that are sent with patient):  None    RN SIGNATURE:  Electronically signed by Marilu Jolly RN on 4/20/21 at 11:13 AM EDT    CASE MANAGEMENT/SOCIAL WORK SECTION    Inpatient Status Date: 4/17/2021    Readmission Risk Assessment Score:  Readmission Risk              Risk of Unplanned Readmission:        14           Discharging to Facility/ [address]            [address] [address]       Phone: [phone]       Fax: [phone]            / signature: Electronically signed by Kimberli Iniguez RN on 4/20/21 at 9:54 AM EDT    PHYSICIAN SECTION    Prognosis: Good    Condition at Discharge: Stable    Rehab Potential (if transferring to Rehab): Good    Recommended Labs or Other Treatments After Discharge:     Physician Certification: I certify the above information and transfer of Autumn Hogan  is necessary for the continuing treatment of the diagnosis listed and that she requires Home Care for greater 30 days.      Update Admission H&P: No change in H&P    PHYSICIAN SIGNATURE:  Electronically signed by Shaylee Martínez MD on 4/20/21 at 9:12 AM EDT

## 2021-04-20 NOTE — OP NOTE
[facility]               [address]                                OPERATIVE REPORT    PATIENT NAME: Deena Lainez                   :        1970  MED REC NO:   [id number]                          ROOM:       5501  ACCOUNT NO:   [de-identified]                           ADMIT DATE: 2021  PROVIDER:     Shanika Sanabria MD    DATE OF PROCEDURE:  2021    ANESTHESIA:  General endotracheal.    SURGEON:  Shanika Sanabria MD    ASSISTANT SURGEON:  Mohinder Hernandez PA-C    COMPLICATIONS:  Bleeding issues intraoperatively. ESTIMATED BLOOD LOSS:  1300 mL. FLUIDS INCLUDED:  One packed red blood cell, albumin, and crystalloid. preop and postop diagnosis: dislocated total hip arthroplasty right hip post revision    PROCEDURE:  Revision, total hip arthroplasty for dislocation. INDICATIONS:  The patient is a 58-year-old female with significant  history of previous dislocation and total hip arthroplasty requiring  revision. She is here with a second dislocation despite revision and  has presented with multiple attempts in the emergency room to reduce her  that were unsuccessful. She is neurologically intact who presents to  surgery for revision of her total hip arthroplasty. Risks and benefits  were explained including wound, anesthesia-related complications,  bleeding disorder, neurologic damage, and ultimately repetitive  dislocation despite surgical intervention. DESCRIPTION OF PROCEDURE:  She was brought to the operating room in  stable condition, underwent induction of general anesthesia, insertion  of airway. Placed in the supine position on [place] table. Previous  incision was well marked. Betadine Steri-Drape was applied over it. Perioperative antibiotics and tranexamic acid were utilized.   After  timeout and antibiotics and TXA were confirmed, anterior incision was  utilized, extended proximally somewhat and going down to the fascia. Aspiration was done of the fluid underneath the fascia appeared to be  bloody and therefore, it was not sent. There were no signs of any  active inflammation or infection. Fascial incision from previously was  encountered and this was incised and extended proximally. Tensor fascia  ajay was reflected medially although there was difficulty finding the  exact origin. Therefore, portion of the rectus and sartorius was  exposed and evaluated for previous entrance into the deeper joint. This  was finally encountered. This was extended down to the deeper aspects  of the hip. Reflection of the soft tissues was done. Retractors were  placed to expose the underlying acetabulum. Scar tissue was removed  where visible and bleeders were coagulated as we proceeded. Retractors were placed around the femur, this was dislocated, head was  removed. Retractors were then placed around the acetabulum with the  femoral pocket being placed superior and posteriorly to the acetabulum  essentially in the area of dislocation. Acetabulum was evaluated. Polyethylene was removed. Screws were removed from the center of the  acetabulum and then using explant-type blades, the bone was cut around  the acetabulum until this freed. When this was removed, underlying bone  was evaluated and found to be relatively intact. Reaming was  subsequently done from a 49 to a 55-mm final reamer. This was made  deeper and extended obviously wider in the pelvic architecture. The  bone was still found to be adequate. 56 cup which was locking cup was  impacted into place subsequently under direct vision and image guidance. Two screws were placed superiorly, two originally nonlocking. A third  screw was placed inferiorly in the pubis which was locking and then  subsequently once secured, position was maintained. The proximal  superior screw posteriorly was exchanged to a locking screw as well.    Acetabular fixation remained relatively good and was placed into  slightly less abducted position and more anteverted position than  preoperatively. Metal liner was placed, this allowed for extension into  a dual mobility hip. Trialing was then done with a 0 and a 4-mm length. A dual mobility hip 4 mm appeared to be the most stable and had  excellent ability to allow for flexion and internal rotation to even 60  degrees without dislocation. This final head assembly was impacted into  place. Reduction obtained. Closure of the soft tissues was obtained with interrupted Ethibond  sutures. Fascial closure was done with 0 Ethibond suture followed by  running barbed suture for the fascia, subcutaneous tissues. Running  nylon was then used. A deep drain emerged anteriorly, Hemovac, and this  was dressed separately with a protective element about the base of the  drain. She left the operating room subsequently in stable condition,  had good quad strength, had good femoral nerve and sciatic nerve  function postoperatively. The morbid obesity of 40 in this patient added significant time, effort and complexity creating additional intraoperative difficulty procedurally for this case and is considered an unusual procedural service for this surgery.        Patt Locke MD    D: 04/19/2021 17:44:49       T: 04/19/2021 19:38:40     [email or web address]  Job#: [id number]     Doc#: [id number]    CC:

## 2021-04-20 NOTE — PROGRESS NOTES
Patient ambulated standby assist x1 with crutches to the bathroom. Removed IV right arm due to patient complaints of pain. Tolerated well. Patient complained of burning, sharp pain to her right hip 7/10. Administered tylenol and gabapentin per mar. Patient is resting comfortably in bed with eyes closed. Bed alarm on and call light within reach.

## 2021-04-20 NOTE — PLAN OF CARE
Problem: Falls - Risk of:  Goal: Will remain free from falls  Description: Will remain free from falls  Outcome: Ongoing  Note: Bed in lowest position with alarm on. Call light within reach. Pt.will call nurse when ambulating. Pt. Verbalized understanding. Personal possessions within reach. Hourly room checks. Problem: Skin Integrity:  Goal: Absence of new skin breakdown  Description: Absence of new skin breakdown  Outcome: Ongoing  Note: Repositioned patient with pillows under legs. Hourly room checks. Pt. Ambulates regularly. Surgical site is clean, dry, intact. Problem: Pain:  Goal: Pain level will decrease  Description: Pain level will decrease  Outcome: Ongoing  Note: Pt. Complains of pain 4/10 right hip. Administered tylenol and gabapentin per mar. Applied ice packs to surgical site. Pt. Expressed satisfaction and is resting comfortably in bed with eyes closed.

## 2021-04-20 NOTE — PROGRESS NOTES
Physical Therapy    Daily Treatment Note      Discharge Recommendations:  Madeline Adams scored a 22/24 on the AM-PAC short mobility form. Current research shows that an AM-PAC score of 18 or greater is typically associated with a discharge to the patient's home setting. Based on the patient's AM-PAC score and their current functional mobility deficits, it is recommended that the patient have 2-3 sessions per week of Physical Therapy at d/c to increase the patient's independence. At this time, this patient demonstrates the endurance and safety to discharge home. Please see assessment section for further patient specific details. Assessment:  Pt doing well. Pt independent with transfers and gait is steady with crutches. Pt plans to d/c home today with assist from spouse. Equipment Needs:  None    Chart Reviewed: Yes   Other position/activity restrictions: Progressive activity day of surgery 4/18, FWBAT RLE, up as tolerated   - Will follow posterior precautions from prior sx 3/1/2021   Additional Pertinent Hx: Madeline Adams is a 45 y.o. female who presents with emergency department after sustaining a dislocation to her right hip on 4/9/21. Diagnosis: Closed dislocation of R hip   Treatment Diagnosis: Impaired functional mobility      Subjective:  Pt found sitting up in chair with spouse in room. \"I can leave after I see you. \"    Pain:  Denies      Objective:    Bed mobility  Sit to Supine:  Independent    Transfers  Sit to stand:  Supervision  Stand to sit:  Supervision    Ambulation  Assistance Level:  SBA  Assistive device:  Axillary crutches  Distance:  300ft   Quality of gait:  Reciprocal gait, WBAT, steady gait    Neuro/balance  Static stance:  Modified independent with crutches  Dynamic stance:  SBA with crutches for ambulation    Patient Education  Role of PT and reviewed hip precautions. Pt verbalized understanding.      Safety Devices  Pt left with needs in reach, supine in bed with alarm in place and RN aware. Spouse in room. AM-PAC score  AM-PAC Inpatient Mobility Raw Score : 22  AM-PAC Inpatient T-Scale Score : 53.28  Mobility Inpatient CMS 0-100% Score: 20.91  Mobility Inpatient CMS G-Code Modifier : CJ    Goals:  Time Frame for Short term goals: DC  Short term goal 1: Supine<>sit with supervision (goal met 4/20/2021 )  Short term goal 2: Sit<>stand with SBA  (goal met 4/20/2021 )  Short term goal 3: Pt will ' with LRAD and CGA (goal partially met 4/20/2021 )  Short term goal 4: Pt will ascend descend 2 steps with B crutches with SBA (not addressed- pt declined need to to steps)    Plan:  Times per week: 5-7; Times per day: Daily  Current Treatment Recommendations: Strengthening, Balance Training, Endurance Training, Functional Mobility Training, Transfer Training, Gait Training, Stair training, Pain Management, Patient/Caregiver Education & Training, Safety Education & Training, Home Exercise Program    Therapy Time    Individual  Concurrent  Group  Co-treatment    Time In  1310            Time Out  1348            Minutes  38            Timed Code Treatment Minutes:  38  Total Treatment Minutes:  38    If patient is discharged prior to next treatment, this note will serve as the discharge summary.     Loy Simpson PT

## 2021-04-20 NOTE — CARE COORDINATION
[facility]    Patient aware and agreeable to services. Faxed orders to [facility].     Ashvin Wright LPN  Care Transition Nurse  [address]  [phone]

## 2021-04-20 NOTE — DISCHARGE SUMMARY
Capillary Refill: Brisk,< 3 seconds   Peripheral Pulses: +2 palpable, equal bilaterally     Labs: For convenience and continuity at follow-up the following most recent labs are provided:    CBC:    Lab Results   Component Value Date    WBC 11.6 04/20/2021    HGB 9.1 04/20/2021    HCT 27.0 04/20/2021     04/20/2021       Renal:    Lab Results   Component Value Date     04/20/2021    K 3.8 04/20/2021    K 4.6 02/26/2021     04/20/2021    CO2 29 04/20/2021    BUN 7 04/20/2021    CREATININE 0.6 04/20/2021    CALCIUM 8.8 04/20/2021    PHOS 3.1 04/20/2021         Significant Diagnostic Studies    Radiology:   XR HIP 2-3 VW W PELVIS RIGHT   Final Result      1. Intraoperative fluoroscopy was provided. FLUORO FOR SURGICAL PROCEDURES   Final Result      1. Intraoperative fluoroscopy was provided. XR HIP RIGHT (1 VIEW)   Final Result   The femoral head component to the patient's prosthesis is dislocated. Findings are unchanged. No fracture. XR HIP 2-3 VW W PELVIS RIGHT   Final Result   Impression:    Dislocation of femoral head component of right total hip arthroplasty from acetabular component. Consults:     IP CONSULT TO ORTHOPEDIC SURGERY  IP CONSULT TO HOSPITALIST  IP CONSULT TO SOCIAL WORK  IP CONSULT TO HOME CARE NEEDS    Disposition:  Home w/ HH     Condition at Discharge: Stable    Discharge Instructions/Follow-up:  pcp in 1-2 weeks    Code Status:  Full Code     Activity: activity as tolerated    Diet: regular diet      Discharge Medications:     Current Discharge Medication List           Details   oxyCODONE (ROXICODONE) 5 MG immediate release tablet Take 1 tablet by mouth every 4 hours as needed for Pain for up to 3 days.   Qty: 12 tablet, Refills: 0    Comments: Reduce doses taken as pain becomes manageable  Associated Diagnoses: Dislocation of internal right hip prosthesis, initial encounter ([facility]              Details   aspirin 325 MG EC tablet Take 1 tablet by mouth 2 times daily  Qty: 60 tablet, Refills: 0      metoprolol succinate (TOPROL XL) 25 MG extended release tablet Take 25 mg by mouth nightly      bisacodyl (DULCOLAX) 5 MG EC tablet Take 1 tablet by mouth 2 times daily as needed for Constipation  Qty: 60 tablet, Refills: 1      gabapentin (NEURONTIN) 400 MG capsule Take 400 mg by mouth 3 times daily. omeprazole (PRILOSEC) 20 MG delayed release capsule Take 20 mg by mouth daily      cetirizine (ZYRTEC) 10 MG tablet Take 10 mg by mouth daily      cycloSPORINE (RESTASIS) 0.05 % ophthalmic emulsion 1 drop 2 times daily      lisinopril-hydrochlorothiazide (PRINZIDE;ZESTORETIC) 20-25 MG per tablet Take 1 tablet by mouth daily. DULoxetine (CYMBALTA) 60 MG capsule Take 60 mg by mouth daily. Polyethylene Glycol 3350 (MIRALAX PO) Take  by mouth. tiZANidine (ZANAFLEX) 4 MG tablet Take 1 tablet by mouth every 8 hours as needed (muscle spasm)  Qty: 60 tablet, Refills: 1      vitamin B-12 (CYANOCOBALAMIN) 1000 MCG tablet Take 1,000 mcg by mouth daily      acyclovir (ZOVIRAX) 200 MG capsule Take 800 mg by mouth daily. Time Spent on discharge is more than 20 minutes in the examination, evaluation, counseling and review of medications and discharge plan. Signed:    Osiel Mckeon MD   4/20/2021      Thank you Teo Case for the opportunity to be involved in this patient's care.

## 2021-04-20 NOTE — PLAN OF CARE
Problem: Falls - Risk of:  Goal: Will remain free from falls  Description: Will remain free from falls  4/20/2021 0251 by Burgess William RN  Outcome: Ongoing   Patient has remained free of falls. 2/4 bed rails up, bed locked and in lowest position, call light within reach. Patient instructed on use of call light and uses appropriately. Bed alarm on. Non-skid footwear and fall band on. Problem: Pain:  Goal: Pain level will decrease  Description: Pain level will decrease  4/20/2021 0251 by Burgess Thomas RN  Outcome: Ongoing   Numeric pain rating scale being used. Patient repositioned for comfort. Ice applied. Patient is tolerating PO pain medicine.

## 2021-04-20 NOTE — PLAN OF CARE
Problem: Falls - Risk of:  Goal: Will remain free from falls  Description: Fall precautions in place. Bed is in lowest position, wheels locked, alarm on, non-skid socks on. Call light and bedside table within reach. Patient calls out appropriately. Patient is up x1 assist with a walker. Will continue to assess and monitor. 4/20/2021 0716 by Ct Mae RN  Outcome: Ongoing       Problem: Pain:  Goal: Pain level will decrease  Description: Pain level will decrease. Patient resting comfortably at this time.   4/20/2021 0716 by Ct Mae RN  Outcome: Ongoing

## 2021-04-20 NOTE — PROGRESS NOTES
Patient discharged via wheelchair with  and all belongings. IV removed. Discharge instructions explained, all questions answered. Hemovac drain removed, sterile dry dressing and tegaderm applied. Hip incision silver dressing CD&I.

## 2021-04-20 NOTE — PROGRESS NOTES
Patient alert and oriented x4. VSS. Dressing to hip is CDI. Neuro checks WNL. Patient tolerating ambulation well. Urinating adequately. Minimal ouptut from hemovac. Pain controlled with PO pain medicine. Fall precautions in place, will continue to monitor.

## 2021-04-20 NOTE — CARE COORDINATION
Case Management Assessment            Discharge Note                    Date / Time of Note: 4/20/2021 9:49 AM                  Discharge Note Completed by: Josie Pascual    Patient Name: Deepti Richmond   YOB: 1970  Diagnosis: Closed dislocation of right hip, initial encounter ([facility] [address] [F63.237K]  Hip dislocation, right, initial encounter ([facility] [address] [R12.428C]   Date / Time: 4/16/2021  9:01 PM    Current PCP: [address] patient: No    Hospitalization in the last 30 days: No    Advance Directives:  Code Status: Full Code  [place] DNR form completed and on chart: Not Indicated    Financial:  Payor: [address] / Plan: BCBS - [place] PPO / Product Type: *No Product type* /      Pharmacy:    [facility]/pharmacy #[id number] Cornelia Moraess, [address] [place] [address]  [address]  Phone: [phone] Fax: [phone]      Assistance purchasing medications?: Potential Assistance Purchasing Medications: No  Assistance provided by Case Management: None at this time    Does patient want to participate in local refill/ meds to beds program?: Yes    Meds To Beds General Rules:  1. Can ONLY be done Monday- Friday between 8:30am-5pm  2. Prescription(s) must be in pharmacy by 3pm to be filled same day  3. Copy of patient's insurance/ prescription drug card and patient face sheet must be sent along with the prescription(s)  4. Cost of Rx cannot be added to hospital bill. If financial assistance is needed, please contact unit  or ;  or  CANNOT provide pharmacy voucher for patients co-pays  5.  Patients can then  the prescription on their way out of the hospital at discharge, or pharmacy can deliver to the bedside if staff is available. (payment due at time of pick-up or delivery - cash, check, or card accepted)     Able to afford home medications/ co-pay costs: Yes    ADLS:  Current PT AM-PAC Score: 18 /24  Current OT AM-PAC Score: 19 /24      DISCHARGE Disposition: Home with Home Health Care: . LOC at discharge: Not Applicable  GAB Completed: Yes    Notification completed in HENS/PAS?:  Not Applicable    IMM Completed:   Not Indicated    Transportation:  Transportation PLAN for discharge: family   Mode of Transport: Private Car  Reason for medical transport: Not Applicable  Name of [address] [place] [address],[address]: Not Applicable  Time of Transport: when ready    Transport form completed: Not Indicated    Home Care:  [address] ordered at discharge: Yes  [address]: [facility] [place]  Phone: [phone]  Fax: [phone]  Orders faxed: Yes    Durable Medical Equipment:  DME Provider:   Equipment obtained during hospitalization: all equipment in home    Home Oxygen and Respiratory Equipment:  Oxygen needed at discharge?: Not [address]: Not Applicable  Portable tank available for discharge?: Not Indicated    Dialysis:  Dialysis patient: No    Dialysis Center:  Not Applicable    Hospice Services:  Location: Not Applicable  Agency: Not Applicable    Consents signed: Not Indicated    Referrals made at [facility] for outpatient continued care:  Not Applicable    Additional CM Notes:   Patient discharging to home with [facility]. CM notified Lyubov Keshia SULTANA of discharge. [facility] will follow.     Rx    these medications from any pharmacy with your printed prescription  1 oxyCODONE      [address] [address]       Phone: [phone]       Fax: [phone]              these medications from any pharmacy with your printed prescription  1 oxyCODONE      The Plan for Transition of Care is related to the following treatment goals of Closed dislocation of right hip, initial encounter [facility]) [S73.004A]  Hip dislocation, right, initial encounter ([facility] [address] [I64.188U]    The Patient and/or patient representative Arlin White and her family were provided with a choice of provider and agrees with the discharge plan Yes    Freedom of choice list was provided with basic dialogue that supports the patient's individualized plan of care/goals and shares the quality data associated with the providers.  Yes    Care Transitions patient: No    Kimberli Iniguez RN  [facility]  Case Management Department  Ph: [phone]  Fax: [phone]

## 2021-04-20 NOTE — PROGRESS NOTES
Occupational Therapy  Facility/Department: [facility] 5T ORTHO/NEURO  Daily Treatment Note  NAME: Antonio Sanchez  : 1970  MRN: [id number]    Date of Service: 2021    Discharge Recommendations:  Antonio Sanchez scored a 19/24 on the AM-PAC ADL Inpatient form. Current research shows that an AM-PAC score of 18 or greater is typically associated with a discharge to the patient's home setting. Based on the patient's AM-PAC score, and their current ADL deficits, it is recommended that the patient have 2-3 sessions per week of Occupational Therapy at d/c to increase the patient's independence. At this time, this patient demonstrates the endurance and safety to discharge home with  24hr assist  and a follow up treatment frequency of 2-3x/wk. Please see assessment section for further patient specific details. If patient discharges prior to next session this note will serve as a discharge summary. Please see below for the latest assessment towards goals. Equipment Needs  Pt with all needs in place at home. Assessment   Assessment: Pt demonstrating functional mobility with crutches and CGA with slow progression. Pt with all needs in place for home discharge and familiar with posterior precautions and AD for LE dressing. Pt would benefit from 24hr assist upon discharge home in care of spouse. Continue OT per POC. OT Education: OT Role;Plan of Care;Precautions; ADL Adaptive Strategies;Transfer Training  Patient Education: Safe home set up - pt verb understanding  Activity Tolerance  Activity Tolerance: Patient Tolerated treatment well;Patient limited by fatigue         Patient Diagnosis(es): The encounter diagnosis was Dislocation of internal right hip prosthesis, initial encounter ([facility] [address] has a past medical history of Arthritis, Hyperlipidemia, Hypertension, and Thyroid disease. has a past surgical history that includes Foot surgery (Bilateral);  Tympanostomy tube placement (Bilateral);

## 2021-04-20 NOTE — PROGRESS NOTES
Patient alert and oriented x4. VSS. Complains of pain of surgical hip and medicated per MAR. Patient tolerates fluids and diet well. Patient voids adequately. Gagnon removed this morning. 1 unit of RBC's given.

## 2021-04-21 ENCOUNTER — TELEPHONE (OUTPATIENT)
Dept: ORTHOPEDIC SURGERY | Age: 51
End: 2021-04-21

## 2021-04-21 NOTE — TELEPHONE ENCOUNTER
She has some questions:    1. They did not send her home with any aspirin or a script for one. She has gotten Roxana 325 coated OTC is that okay? 2. Should she stop her medications like Mobic and glucosamine? I told her yes to stop those, but is there anything else she should not be taking right now? 3. They took her drain out before she left the hospital, she is now having a stinging sensation that extends up to her breast. Wants to know what that could be.

## 2021-04-22 NOTE — TELEPHONE ENCOUNTER
The 325 mg coated over-the-counter aspirin is perfectly fine taking it twice a day for the first 4 weeks and then can drop down to once a day for 2 weeks after that totaling 6 weeks utilization of aspirin. You can utilize the glucosamine that is perfectly fine allow for 1 week before utilizing the Mobic then can restart utilizing the Mobic as needed. As far as the stinging pain that extends up to her breast I am not sure as to what that is we can evaluate that when she comes in for her postoperative examination. If it gets worse please let us know we can get her in sooner.

## 2021-04-23 ENCOUNTER — TELEPHONE (OUTPATIENT)
Dept: ORTHOPEDIC SURGERY | Age: 51
End: 2021-04-23

## 2021-04-23 NOTE — TELEPHONE ENCOUNTER
Henry Baig with [employer] called to get verbal orders for PT and OT. I also told her that yes the patient should put the wil hose back on at least until we see her. The bandages are usually left alone unless there is drainage.

## 2021-05-05 ENCOUNTER — OFFICE VISIT (OUTPATIENT)
Dept: ORTHOPEDIC SURGERY | Age: 51
End: 2021-05-05

## 2021-05-05 VITALS — BODY MASS INDEX: 38.76 KG/M2 | WEIGHT: 227 LBS | HEIGHT: 64 IN

## 2021-05-05 DIAGNOSIS — Z96.641 HISTORY OF REVISION OF TOTAL REPLACEMENT OF RIGHT HIP JOINT: Primary | ICD-10-CM

## 2021-05-05 PROCEDURE — 99024 POSTOP FOLLOW-UP VISIT: CPT | Performed by: PHYSICIAN ASSISTANT

## 2021-05-05 RX ORDER — FLUCONAZOLE 150 MG/1
150 TABLET ORAL ONCE
Qty: 3 TABLET | Refills: 0 | Status: SHIPPED | OUTPATIENT
Start: 2021-05-05 | End: 2021-05-05

## 2021-05-05 RX ORDER — CEFADROXIL 500 MG/1
500 CAPSULE ORAL 2 TIMES DAILY
Qty: 28 CAPSULE | Refills: 0 | Status: SHIPPED | OUTPATIENT
Start: 2021-05-05 | End: 2021-05-19

## 2021-05-05 NOTE — PROGRESS NOTES
Patient Name: Toma Maldonado MRN: [id number]   Age: 45 y.o. YOB: 1970   Sex: female         [address] [employer] [address] Complaint   Patient presents with    Hip Pain     Post op Right hip sx 04/18/21, no pain today. HISTORY OF PRESENT ILLNESS   Patient returns for their first postoperative evaluation status post right total hip revision. The patient is doing very well. They have minimal complaints of pain. Rates pain as a 3-4 out of 10      There is moderate swelling about the Hip. The patient has been doing home physical therapy exercises at home with home therapist.      They deny any calf swelling or numbness or tingling down the leg     Assessment   PHYSICAL EXAM   Vital Signs: There were no vitals filed for this visit. Examination of the hip shows a well-healed incision. Edges are well opposed. There is mild swelling. There is no drainage. Range of motion is up to 95. There is no calf tenderness. The patient is neurovascularly intact. No signs of DVT. Calf nontender    Gross distal sensation is decreased in the lateral thigh from the area of the incision as expected. RADIOLOGY   X-Rays reviewed: two views of the right hip AP and lateral x-rays of the hip show excellent alignment of the total hip prosthesis. There is no loosening no fractures noted. IMPRESSION   2 weeks status post right total hip revision    PLAN   The patient is doing well 2 weeks status post right total hip revision. ICD-10-CM    1. 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 They will continue ice and elevation for the hip. They will continue aspirin therapy for DVT prophylaxis.   Patient prescription for cefadroxil 500 mg 1 twice daily for 14 days  Gave patient prescription for degludec Diflucan 150 mg 1 tablet p.o. daily quantity 3 0 refills  Advised patient to remain off work for the next 6 weeks with potential return to work date on June 1 pending patient's recovery.   Plan for simple gentle exercises and walking.  -Advised for follow-up in 2 weeks for suture removal

## 2021-05-05 NOTE — LETTER
[address] [place] [address]  [facility] [address]  Phone: [phone]  Fax: [place], [place]        May 5, 2021     Patient: Nikolas Brooke   YOB: 1970   Date of Visit: 5/5/2021       To Whom It May Concern: It is my medical opinion that Brionna Jules should remain off work from 04/16/21 to 06/01/21. This patient is a good candidate for short term disability. If you have any questions or concerns, please don't hesitate to call.     Sincerely,        ERCIA Grayson

## 2021-05-10 LAB
ANAEROBIC CULTURE: NORMAL
GRAM STAIN RESULT: NORMAL
WOUND/ABSCESS: NORMAL

## 2021-05-17 LAB
FUNGUS (MYCOLOGY) CULTURE: NORMAL
FUNGUS STAIN: NORMAL

## 2021-05-19 ENCOUNTER — OFFICE VISIT (OUTPATIENT)
Dept: ORTHOPEDIC SURGERY | Age: 51
End: 2021-05-19

## 2021-05-19 VITALS — BODY MASS INDEX: 38.76 KG/M2 | WEIGHT: 227 LBS | HEIGHT: 64 IN

## 2021-05-19 DIAGNOSIS — Z96.641 HISTORY OF REVISION OF TOTAL REPLACEMENT OF RIGHT HIP JOINT: Primary | ICD-10-CM

## 2021-05-19 PROCEDURE — 99024 POSTOP FOLLOW-UP VISIT: CPT | Performed by: PHYSICIAN ASSISTANT

## 2021-05-19 NOTE — PROGRESS NOTES
Patient Name: Tom Mills MRN: [id number]   Age: 45 y.o. YOB: 1970   Sex: female         [address] Complaint   Patient presents with    Hip Pain     F/U Right hip, no pain today       HISTORY OF PRESENT ILLNESS   Patient returns for their second postoperative evaluation status post right total hip revision. The patient is doing very well. They have minimal complaints of pain. Rates pain as a 0-1 out of 10      There is mild swelling about the Hip. The patient has been doing home physical therapy exercises at home with home therapist.    She is wanting to have her sutures removed at today's visit if possible     They deny any calf swelling or numbness or tingling down the leg     Assessment   PHYSICAL EXAM   Vital Signs: There were no vitals filed for this visit. Examination of the hip shows a well-healed incision. Edges are well opposed. There is mild swelling. There is no drainage. Range of motion is up to 95. There is no calf tenderness. The patient is neurovascularly intact. No signs of DVT. Calf nontender    Gross distal sensation is decreased in the lateral thigh from the area of the incision as expected. RADIOLOGY   X-Rays reviewed: two views of the right hip AP and lateral x-rays of the hip preformed on 5/5/21 show excellent alignment of the total hip prosthesis. There is no loosening no fractures noted. IMPRESSION   4 weeks status post right total hip revision    PLAN   The patient is doing well 4 weeks status post right total hip revision. ICD-10-CM    1. 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 They will continue ice and elevation for the hip. They will continue aspirin therapy for DVT prophylaxis.   Finish antibiotic use   Gave patient prescription for degludec Diflucan 150 mg 1 tablet p.o. daily

## 2021-06-01 LAB
AFB CULTURE (MYCOBACTERIA): NORMAL
AFB SMEAR: NORMAL

## 2021-06-16 ENCOUNTER — OFFICE VISIT (OUTPATIENT)
Dept: ORTHOPEDIC SURGERY | Age: 51
End: 2021-06-16

## 2021-06-16 VITALS — WEIGHT: 227 LBS | HEIGHT: 64 IN | BODY MASS INDEX: 38.76 KG/M2

## 2021-06-16 DIAGNOSIS — Z96.641 STATUS POST RIGHT HIP REPLACEMENT: Primary | ICD-10-CM

## 2021-06-16 PROCEDURE — 99024 POSTOP FOLLOW-UP VISIT: CPT | Performed by: PHYSICIAN ASSISTANT

## 2021-06-16 NOTE — PROGRESS NOTES
Patient Name: Toma Maldonado MRN: [id number]   Age: 45 y.o. YOB: 1970   Sex: female         [address] Complaint   Patient presents with    Hip Pain     F/U Right hip, no pain today       HISTORY OF PRESENT ILLNESS   Patient returns for their third postoperative evaluation status post right total hip revision. The patient is doing very well. They have minimal complaints of pain. Rates pain as a 0-1 out of 10      There is mild swelling about the Hip. The patient has been doing home physical therapy exercises at home with home therapist.    Has been increasing her walking around the neighborhood notes doing well    They deny any calf swelling or numbness or tingling down the leg     Assessment   PHYSICAL EXAM   Vital Signs: There were no vitals filed for this visit. Examination of the hip shows a well-healed incision. Edges are well opposed. There is mild swelling. There is no drainage. Range of motion is up to 105. There is no calf tenderness. The patient is neurovascularly intact. No signs of DVT. Calf nontender    Gross distal sensation is decreased in the lateral thigh from the area of the incision as expected. RADIOLOGY   X-Rays reviewed: two views of the right hip AP and lateral x-rays of the hip preformed and reviewed today show excellent alignment of the total hip prosthesis. There is no loosening no fractures noted. IMPRESSION   8 weeks status post right total hip revision    PLAN   The patient is doing well 8 weeks status post right total hip revision. ICD-10-CM    1. Status post right hip replacement  Z96.641 XR HIP RIGHT (2-3 VIEWS)     We will continue home physical therapy for aggressive range of motion and strengthening. They will continue ice and elevation for the hip.     Advised patient to begin returning to work as tolerated  Plan for simple gentle exercises and walking.  -Advised for follow-up in 4 weeks for re-evaluation

## 2021-06-28 ENCOUNTER — HOSPITAL ENCOUNTER (OUTPATIENT)
Dept: PHYSICAL THERAPY | Age: 51
Setting detail: THERAPIES SERIES
Discharge: HOME | End: 2021-06-28
Payer: COMMERCIAL

## 2021-06-28 PROCEDURE — 97162 PT EVAL MOD COMPLEX 30 MIN: CPT

## 2021-06-28 PROCEDURE — 95992 CANALITH REPOSITIONING PROC: CPT

## 2021-06-28 NOTE — PROGRESS NOTES
[facility]  [address]     [place], [facility]  Phone: [phone]       Fax:   [phone]          Dear  Referring Practitioner: Dr. Lisa Mcnair,    We had the pleasure of evaluating the following patient for physical therapy services at [address] [facility]. A summary of our findings can be found in the initial assessment below. This includes our plan of care. If you have any questions or concerns regarding these findings, please do not hesitate to contact me at the office phone number above.   Thank you for this referral.       Physician/Provider Signature:_______________________________Date:__________________  By signing above (or electronic signature), therapist's plan is approved by physician           PHYSICAL THERAPY VESTIBULAR EVALUATION    Patient: Manuel Gerard     : 1970     MRN: [id number]    Referring Physician: Referring Practitioner: Dr. 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   [] Yes, Patient intake triggered further evaluation      [] C-SSRS Screening completed  [] PCP notified via Plan of Care  [] Emergency services notified      SUBJECTIVE FINDINGS        History of Present Illness:         Patient reports symptoms began: pt states this episode of vertigo started about 6/14/21, pt states she has had this before in the past and has always resolved in a few days. Pt never had treatment since it always went away. Pt has had 3 hip surgeries (THR R and 2 revisions due to dislocations) in the past year and pt afraid of falling and injuring it again. Pt's last dislocation was in April of this year and surgery 4/18/21. pt has anterior approach and pt still has restrictions (no bending, no crossing legs, no turning the foot in per pt, and use of pillow in between knees when on side). Pt states hip dislocated posteriorly. Pt only returns to MD if needed. Pt experiences symptoms of vertigo? YES  Describe:     room spinning, loss of balance, uneasiness, dizziness and anxiety    How long do these symptoms last?      seconds    How often do spells occur?      daily    Vertigo is:     induced by motion, induced by position changes  and bending over, looking up, lying down, rolling    Pt experiences disequilibrium? YES    Associated hearing/ ear symptoms:     NO    Describe:    Tinnitus BILATERAL  Chronic     Any recent illness or infections? No    Any recent accidents or head trauma? No    Any history of migraines or headaches? No      History of Prior Therapy/Testing (e.g. MRI):  Specialist visit ENT    Review of Medications:    Pt h/o or currently taking any vestibular suppressants? No    Pt aware of any medications that may cause dizziness? No    History of Falls or near Falls:    Any falls to the ground? NO  How many in last 6 months? 0    Does pt complain of stumble, stagger, or side step while walking? YES  Does pt complain of drift to one side while walking?        no    Limitations (hearing/vision loss/other):    Does pt wear glasses/contacts?     no    Does pt have chronic hearing loss?      yes: BILATERAL    Does pt wear hearing aids?    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[employer], out for the summer     Sport/recreational activities: reading, cooking, adult coloring books     Patient goal for therapy:  \"to not be dizzy\"        OBJECTIVE FINDINGS      Blood Pressure:  tested  Supine:   Seated: 131/79 HR 54  Standin/83 HR66    Cervical AROM:    decreased by 25%  Description: no pain    Vertebral Artery test in sitting position:     Negative    Oculomotor Examination:    Room Light:   Spontaneous Nystagmus:  NO   NA     Positional Testing:   tested  Right Jaylen Hallpike:    description: initially thought to be negative however on first test, pt reports vertigo \"about to start\" and PT may have seen a few beats of nystagmus, upon further testing, PT notes more vigorous nystagmus upbeating and torsional to the right  Left Jaylen Hallpike:      vertigo noted, nystagmus noted, description: upbeating and torsional to left    Right Roll test:      NT  Left Roll test:      NT  Head Center Test:     NT         Balance Testing:     not tested    Gait Testing:   tested  Level of Assistance needed:  Independent  Gait Deviations (firm surface/linoleum):    None  Assistive Device Used:  No AD    Stairs:   Level of Assistance needed:      Not Tested  Pt able to negotiate up/down 4 stairs:  N/A  Assistive Device Used:      N/A      Bandages/Dressings/Incisions: [x]None        [x] Patient history, allergies, meds reviewed. Medical chart reviewed. See intake form. Review of Systems (ROS):  [x]Performed Review of systems (Integumentary, Cardiopulmonary, Neurological) by intake and observation. Intake form has been scanned into medical record.  Patient has been instructed to contact their Motion Sensitivity   [] Unilateral Vestibular Hypofunction [] Bilateral Vestibular Hypofunction   [] Gait Instability    [] Decreased tolerance for ADLs    [] Decreased functional strength   [] Reduced Balance/Proprioceptive control   [] Reduced ability to hear/focus   [] Noted cervical/thoracic/GHJ joint hypomobility   [] Noted cervical/thoracic/GHJ joint hypermobility   [] Decreased cervical/UE functional ROM   [] Noted Headache pain aggravated by neck movements with/without dizziness   [] Abnormal reflexes/sensation/myotomal/dermatomal deficits   [] Decreased DCF control or ability to hold head up   [] Decreased RC/scapular/core strength and neuromuscular control     Functional Activity Limitations (from functional questionnaire and intake)   []Reduced ability to tolerate prolonged functional positions   [x]Reduced ability or difficulty with changes of positions or transfers between positions  [x]Reduced ability to transfer in/out of bed or rolling in bed   []Reduced ability or tolerance with driving, reading and/or computer work   []Reduced ability to perform lifting, reaching, carrying tasks   [x]Reduced ability to forward bend   []Reduced ability to ambulate prolonged functional periods/distances/surfaces   []Reduced ability to ascend/descend stairs  []Reduced ability to concentrate/focus  []Reduced ability to turn/pitch head rapidly  []Reduced ability to self-correct for losses of balance   []other:       Participation Restrictions   []Reduced participation in self-care activities   [x]Reduced participation in home management activities   [x]Reduced participation in work activities   [x]Reduced participation in social activities   []Reduced participation in sport/recreational activities    Classification:   [x]Signs/symptoms consistent with BPPV (benign paroxysmal positional vertigo)      []Signs/symptoms consistent with unilateral peripheral vestibulopathy (i.e., vestibular neuritis, labyrinthitis, acoustic neuroma)   []Signs/symptoms consistent with central vestibulopathy  []Signs/symptoms consistent with migraine-related vestibulopathy  []Signs/symptoms consistent with Meniere's disease / post-traumatic hydrops  []Signs/symptoms consistent with perilymphatic fistula   []Signs/symptoms consistent with cervicogenic dizziness  []Signs/symptoms consistent with gait instability   []Signs/symptoms consistent with motion sensitivity    []signs/symptoms consistent with neck pain with mobility deficits     []signs/symptoms consistent with neck pain with movement coordinated impairments    []signs/symptoms consistent with neck pain with radiating pain    []signs/symptoms consistent with neck pain with headaches (cervicogenic)    []Signs/symptoms consistent with nerve root involvement including myotome & dermatome dysfunction   []sign/symptoms consistent with facet dysfunction of cervical and thoracic spine    []signs/symptoms consistent suggesting central cord compression/UMN syndromes   []signs/symptoms consistent with discogenic cervical pain   []signs/symptoms consistent with rib dysfunction   []signs/symptoms consistent with postural dysfunction   []signs/symptoms consistent with shoulder pathology    []signs/symptoms consistent with post-surgical status including decreased ROM, strength and function.    []signs/symptoms consistent with pathology which may benefit from Dry Needling  []signs/symptoms which may limit the use of advanced manual therapy techniques: (Elevated CV risk profile, recent trauma, intolerance to end range positions, prior TIA, visual issues, UE neurological compromise)   []other:      Prognosis/Rehab Potential:      [x]Excellent   []Good    []Fair   []Poor    Tolerance of evaluation/treatment:    []Excellent   [x]Good    []Fair   []Poor     Physical Therapy Evaluation Complexity Justification  [x] A history of present problem with:  [] no personal factors and/or comorbidities that impact the plan of balance, postural re-education, activity modification, progression of HEP. HEP instruction: Written HEP instructions and/or education materials provided and reviewed. GOALS:  Patient stated goal: \"to not be dizzy\"  [] Progressing: [] Met: [] Not Met: [] Adjusted    Therapist goals for Patient:   Short Term Goals: To be achieved in: 3 weeks  1. Independent in HEP and progression per patient tolerance, in order to prevent re-injury. [] Progressing: [] Met: [] Not Met: [] Adjusted  2. Patient will have a decrease in dizziness by 50% to facilitate improvement in movement, function, balance and ADLs as indicated by Functional Deficits. [] Progressing: [] Met: [] Not Met: [] Adjusted    Long Term Goals: To be achieved in: 6 weeks  1. Disability index score of 10 or less for the [facility] to assist with reaching prior level of function. [] Progressing: [] Met: [] Not Met: [] Adjusted  2. Goal not appropriate for patient   3. Patient will demonstrate negative oculomotor special testing/positional testing as indicated by patients Functional Deficits. [] Progressing: [] Met: [] Not Met: [] Adjusted  4. Patient will return to functional activities including lying flat, rolling in bed, bending (within limits of hip precautions and looking up) without increased symptoms or restriction. [] Progressing: [] Met: [] Not Met: [] Adjusted  5.  Pt will score at least 27/28 on Tinetti balance and gait assessment     [] Progressing: [] Met: [] Not Met: [] Adjusted     Electronically signed by:  Kortney Fowler PT , OMT-C, [id number]

## 2021-06-29 NOTE — FLOWSHEET NOTE
Physical Therapy Daily Treatment Note  Date:  2021    Patient Name:  Bong Mackey    :  1970  MRN: [id number]      Medical/Treatment Diagnosis Information:  Diagnosis: BPPV [place]  Treatment Diagnosis: B BPPV, vertigo, functional limitations    Insurance/Certification information:  PT Insurance Information: [employer] [address] 60 visits no precert needed    Physician Information:  : Dr. Villanueva Jurist of care signed :  []  Yes  [x] No  []  Cosign [x]  Fax    Date of Patient follow up with Physician:  Only if needed    Is this a Progress Report:     []  Yes  [x]  No      If Yes:  Date Range for reporting period:  Beginning 2021  Ending    Progress report will be due (10 Rx or 30 days whichever is less): By 50       Recertification will be due (POC Duration  / 90 days whichever is less): 21 or  visit        Visit # Insurance Allowable Auth Required     60 []  Yes [x]  No        Functional Scale:       Date 21   Score 34/100      Latex Allergy:  [x]NO      []YES  Preferred Language for Healthcare:   [x]English       []other:    RESTRICTIONS/PRECAUTIONS:  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 · Reassess for BPPV, need to test L ear first and see if resolved. If not, continue to treat left ear. Pt will also need CRP for R ear when able    · Assess oculomotor, balance if needed     OBJECTIVE: See eval      Exercises/Interventions:     Exercises in bold performed in department today. Items not bolded are carried forward from prior visits for continuity of the record.   Exercise/Equipment Resistance/Repetitions HEP Other comments       []        []        []        []        []        []        []        [] []          []         []        []        []        []        []        []        []        []      Therapeutic Exercise/Home Exercise Program:   0 minutes    Therapeutic Activity:  0 minutes     Gait: 0 minutes    Neuromuscular Re-Education:  0 minutes      Canalith Repositioning Procedure:  30 minutes  Pt + for BPPV B  Treated L ear x 3 with CRP for posterior canal canalithiasis  Pt did well with treatment but dizziness not resolved due to BPPV R ear also. Pt educated on BPPV and provided handout     Manual Therapy:  0 minutes    Modalities: 0 minutes      ASSESSMENT:      GOALS:  Patient stated goal: \"to not be dizzy\"  []? Progressing: []? Met: []? Not Met: []? Adjusted     Therapist goals for Patient:   Short Term Goals: To be achieved in: 3 weeks  1. Independent in HEP and progression per patient tolerance, in order to prevent re-injury. []? Progressing: []? Met: []? Not Met: []? Adjusted  2. Patient will have a decrease in dizziness by 50% to facilitate improvement in movement, function, balance and ADLs as indicated by Functional Deficits. []? Progressing: []? Met: []? Not Met: []? Adjusted     Long Term Goals: To be achieved in: 6 weeks  1. Disability index score of 10 or less for the [address] [place] [address] to assist with reaching prior level of function. []? Progressing: []? Met: []? Not Met: []? Adjusted  2. Goal not appropriate for patient   [][][][]  3. Patient will demonstrate negative oculomotor special testing/positional testing as indicated by patients Functional Deficits. []? Progressing: []? Met: []? Not Met: []? Adjusted  4. Patient will return to functional activities including lying flat, rolling in bed, bending (within limits of hip precautions and looking up) without increased symptoms or restriction. []? Progressing: []? Met: []? Not Met: []? Adjusted  5. Pt will score at least 27/28 on Tinetti balance and gait assessment     []? Progressing: []? Met: []? Not Met: []?  Adjusted              Overall Progression Towards Functional goals/ Treatment Progress Update:  [] Patient is progressing as expected towards functional goals listed. [] Progression is slowed due to complexities/Impairments listed. [] Progression has been slowed due to co-morbidities. [x] Plan just implemented, too soon to assess goals progression <30days   [] Goals require adjustment due to lack of progress  [] Patient is not progressing as expected and requires additional follow up with physician  [] Other    Prognosis for POC: [x] Good [] Fair  [] Poor    Patient requires continued skilled intervention: [x] Yes  [] No    Treatment/Activity Tolerance:  [x] Patient able to complete treatment  [] Patient limited by fatigue  [] Patient limited by pain    [] Patient limited by other medical complications  [] Other:         PLAN: See eval  [] Continue per plan of care [] Alter current plan (see comments above)  [x] Plan of care initiated [] Hold pending MD visit [] Discharge        Therapeutic Exercise and NMR EXR  [] (47055) Provided verbal/tactile cueing for activities related to strengthening, flexibility, endurance, ROM  for improvements in proximal strength and core control with self care, mobility, lifting and ambulation.  [] (09349) Provided verbal/tactile cueing for activities related to improving balance, coordination, kinesthetic sense, posture, motor skill, proprioception  to assist with core control in self care, mobility, lifting, and ambulation.      Therapeutic Activities and Gait:    [] (56600 or 64902) Provided verbal/tactile cueing for activities related to improving balance, coordination, kinesthetic sense, posture, motor skill, proprioception and motor activation to allow for proper function  with self care and ADLs  [] (24792) Provided training and instruction to the patient for proper core and proximal hip recruitment and positioning with ambulation re-education     Home Exercise Program:    [] (06142) Reviewed/Progressed HEP activities related to strengthening, flexibility, endurance, ROM of core, proximal hip and LE for functional self-care, mobility, lifting and ambulation   [] (18840) Reviewed/Progressed HEP activities related to improving balance, coordination, kinesthetic sense, posture, motor skill, proprioception of core, proximal hip and LE for self care, mobility, lifting, and ambulation      Manual Treatments:  PROM / STM / Oscillations-Mobs:  G-I, II, III, IV (PA's, Inf., Post.)  [] (00392) Provided manual therapy to mobilize proximal hip and LS spine soft tissue/joints for the purpose of modulating pain, promoting relaxation,  increasing ROM, reducing/eliminating soft tissue swelling/inflammation/restriction, improving soft tissue extensibility and allowing for proper ROM for normal function with self care, mobility, lifting and ambulation. Modalities:      CRP: assessment, treatment, and education for BPPV     Charges:  Timed Code Treatment Minutes: 0   Total Treatment Minutes: 65     Medicare Cap total YTD:        [x]N/A  Workers Comp Time Stamp  (Per CPT and Total Treatment) [x]N/A   Time In:   Time Out:       [x] EVAL  [] ZF(04653) x     [] IONTO  [] NMR (95308) x     [] VASO  [] Manual (37513) x      [x] Other: CRP  [] TA x     [] Gait x   [] Mech Traction (19626)  [] ES(attended) (43478)     [] ES (un) (87604):       Electronically signed by: Anne Jimenez, PT , OMT-C, [id number]         Note: If patient does not return for scheduled/ recommended follow up visits, this note will serve as a discharge from care along with most recent update on progress.

## 2021-06-30 ENCOUNTER — HOSPITAL ENCOUNTER (OUTPATIENT)
Dept: PHYSICAL THERAPY | Age: 51
Setting detail: THERAPIES SERIES
Discharge: HOME | End: 2021-06-30
Payer: COMMERCIAL

## 2021-06-30 PROCEDURE — 97112 NEUROMUSCULAR REEDUCATION: CPT

## 2021-06-30 NOTE — FLOWSHEET NOTE
Physical Therapy Daily Treatment Note   And Discharge Summary     Dr. Church Shows,     Patient was seen for 2 Visits of PT. Initial eval date 21. Pt initially tested positive for BILAT Posterior Canalithiasis BPPV. Pt was treated for both canals on day of eval.  Pt presents this visit stating that she feels great with no further C/O dizziness or loss of balance. Pt was provided Gaze stabilization exercises to progress any potential hypofunction. Pt is agreeable to DC. Pt progressed well meeting all Short Term and all Long Term Goals of therapy. Plan to DC at this time, with recommendation to continue HEP as prepared. Please see attached treatment note for most recent status. Thank you for your referral of this patient.      Paz Londono, PT , MPT, OMT-c [id number]        Date:  2021    Patient Name:  Kj Simeon    :  1970  MRN: [id number]      Medical/Treatment Diagnosis Information:  Diagnosis: BPPV [facility]  Treatment Diagnosis: B BPPV, vertigo, functional limitations    Insurance/Certification information:  PT Insurance Information: viki Kaplan 60 visits no precert needed    Physician Information:  : Dr. Lalito Watt of [facility] signed :  []  Yes  [x] No  []  Cosign [x]  Fax    Date of Patient follow up with Physician:  Only if needed    Is this a Progress Report:     [x]  Yes  []  No      If Yes:  Date Range for reporting period:  Beginning  2021  Ending  21    Progress report will be due (10 Rx or 30 days whichever is less): By        Recertification will be due (POC Duration  / 90 days whichever is less): 21 or 12th visit        Visit # Insurance Allowable Auth Required     60 []  Yes [x]  No        Functional Scale:       Date 21   Score 34/100      Latex Allergy:  [x]NO      []YES  Preferred Language for Healthcare:   [x]English       []other:    RESTRICTIONS/PRECAUTIONS:  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 Lasted about an hour. Has not had any further dizzy spells. Maybe a few brief moments when she stood up too fast.   Has not felt dizzy since her evaluation. Woke up today feeling really good and not dizzy at all. Pain level:  0/10      PLAN: Begin PT with an emphasis on  · Pt appears to be + for BPPV Bilateral ears, B posterior canal canalithiasis. · Reassess for BPPV, need to test L ear first and see if resolved. If not, continue to treat left ear. Pt will also need CRP for R ear when able    · Assess oculomotor, balance if needed     OBJECTIVE: See eval      Exercises/Interventions:     Exercises in bold performed in department today. Items not bolded are carried forward from prior visits for continuity of the record. Exercise/Equipment Resistance/Repetitions HEP Other comments       []     VOR GS Protocol Week 1/2  1 min each [x]        []        []        []        []        []        []        []          []         []        []        []        []        []        []        []        []      Therapeutic Exercise/Home Exercise Program:   0 minutes    Therapeutic Activity:  0 minutes     Gait: 0 minutes    Neuromuscular Re-Education:  30 minutes  Reassess for BPPV. Negative in all planes   Initiated Vestibular protocol week 1/2 for reduction of hypofunction. Pt indep and can perform at home. Canalith Repositioning Procedure: Not Indicated   Pt - for BPPV B this visit      Manual Therapy:  0 minutes    Modalities: 0 minutes      ASSESSMENT:  See above DC note. GOALS:  Patient stated goal: \"to not be dizzy\"  []? Progressing: [x]? Met: []? Not Met: []? Adjusted     Therapist goals for Patient:   Short Term Goals: To be achieved in: 3 weeks  1. Independent in HEP and progression per patient tolerance, in order to prevent re-injury. []? Progressing: [x]?  Met: NMR EXR  [] (00038) Provided verbal/tactile cueing for activities related to strengthening, flexibility, endurance, ROM  for improvements in proximal strength and core control with self care, mobility, lifting and ambulation. [x] (16335) Provided verbal/tactile cueing for activities related to improving balance, coordination, kinesthetic sense, posture, motor skill, proprioception  to assist with core control in self care, mobility, lifting, and ambulation. Therapeutic Activities and Gait:    [] (74692 or 64282) Provided verbal/tactile cueing for activities related to improving balance, coordination, kinesthetic sense, posture, motor skill, proprioception and motor activation to allow for proper function  with self care and ADLs  [] (05349) Provided training and instruction to the patient for proper core and proximal hip recruitment and positioning with ambulation re-education     Home Exercise Program:    [] (53460) Reviewed/Progressed HEP activities related to strengthening, flexibility, endurance, ROM of core, proximal hip and LE for functional self-care, mobility, lifting and ambulation   [] (90591) Reviewed/Progressed HEP activities related to improving balance, coordination, kinesthetic sense, posture, motor skill, proprioception of core, proximal hip and LE for self care, mobility, lifting, and ambulation      Manual Treatments:  PROM / STM / Oscillations-Mobs:  G-I, II, III, IV (PA's, Inf., Post.)  [] (03910) Provided manual therapy to mobilize proximal hip and LS spine soft tissue/joints for the purpose of modulating pain, promoting relaxation,  increasing ROM, reducing/eliminating soft tissue swelling/inflammation/restriction, improving soft tissue extensibility and allowing for proper ROM for normal function with self care, mobility, lifting and ambulation.      Modalities:      CRP: assessment, treatment, and education for BPPV     Charges:  Timed Code Treatment Minutes: 30   Total Treatment Minutes:

## 2021-07-21 ENCOUNTER — OFFICE VISIT (OUTPATIENT)
Dept: ORTHOPEDIC SURGERY | Age: 51
End: 2021-07-21
Payer: COMMERCIAL

## 2021-07-21 DIAGNOSIS — Z96.641 HISTORY OF REVISION OF TOTAL REPLACEMENT OF RIGHT HIP JOINT: Primary | ICD-10-CM

## 2021-07-21 PROCEDURE — 99213 OFFICE O/P EST LOW 20 MIN: CPT | Performed by: PHYSICIAN ASSISTANT

## 2021-07-21 NOTE — PROGRESS NOTES
Patient Name: Chen Maher MRN: [id number]   Age: 45 y.o. YOB: 1970   Sex: female         [address] Complaint   Patient presents with    Follow-up     Revision of right total hip       HISTORY OF PRESENT ILLNESS   Patient returns for their fourth postoperative evaluation status post right total hip revision. The patient is doing very well. They have minimal complaints of pain. Rates pain as a 0-1 out of 10      There is minimal swelling about the Hip. The patient has been doing home physical therapy exercises at home with home therapist.    Has been increasing her walking around the neighborhood notes doing well she is hopeful to begin reduction in some of the restrictions. They deny any calf swelling or numbness or tingling down the leg     Assessment   PHYSICAL EXAM   Vital Signs: There were no vitals filed for this visit. Examination of the hip shows a well-healed incision. Edges are well opposed. There is mild swelling. There is no drainage. Range of motion is up to 110. There is no calf tenderness. The patient is neurovascularly intact. No signs of DVT. Calf nontender    Gross distal sensation is decreased in the lateral thigh from the area of the incision as expected. RADIOLOGY   X-Rays reviewed: two views of the right hip AP and lateral x-rays of the hip preformed on 6/16/2021 and reviewed today show excellent alignment of the total hip prosthesis. There is no loosening no fractures noted. IMPRESSION   13 weeks status post right total hip revision    PLAN   The patient is doing well 13 weeks status post right total hip revision. ICD-10-CM    1. 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 Discussed the patient can slowly reduce restrictions as tolerated in regards to feeling muscular stretching and pulling.   They will continue ice and elevation for the hip.     Advised patient to begin returning to work as tolerated  Plan for simple gentle exercises and walking.  -Advised for follow-up in 6 months for re-evaluation

## 2021-09-27 ENCOUNTER — HOSPITAL ENCOUNTER (OUTPATIENT)
Dept: GENERAL RADIOLOGY | Age: 51
Discharge: HOME | End: 2021-09-27
Payer: COMMERCIAL

## 2021-09-27 ENCOUNTER — HOSPITAL ENCOUNTER (OUTPATIENT)
Age: 51
Discharge: HOME | End: 2021-09-27
Payer: COMMERCIAL

## 2021-09-27 DIAGNOSIS — M54.16 LUMBAR RADICULITIS: ICD-10-CM

## 2021-09-27 PROCEDURE — 72100 X-RAY EXAM L-S SPINE 2/3 VWS: CPT

## 2022-01-14 ENCOUNTER — OFFICE VISIT (OUTPATIENT)
Dept: ORTHOPEDIC SURGERY | Age: 52
End: 2022-01-14
Payer: COMMERCIAL

## 2022-01-14 VITALS — HEIGHT: 64 IN | WEIGHT: 227 LBS | BODY MASS INDEX: 38.76 KG/M2

## 2022-01-14 DIAGNOSIS — Z96.641 HISTORY OF REVISION OF TOTAL REPLACEMENT OF RIGHT HIP JOINT: Primary | ICD-10-CM

## 2022-01-14 PROCEDURE — MISCD282 ADJUSTA LIFT: Performed by: PHYSICIAN ASSISTANT

## 2022-01-14 PROCEDURE — 99213 OFFICE O/P EST LOW 20 MIN: CPT | Performed by: PHYSICIAN ASSISTANT

## 2022-01-14 NOTE — PROGRESS NOTES
Patient Name: Jaleel Adams MRN: <[id number]>   Age: 45 y.o. YOB: 1970   Sex: female         [address] Complaint   Patient presents with    Hip Pain     right hip       HISTORY OF PRESENT ILLNESS   Patient returns for their sixth postoperative evaluation status post right total hip revision. The patient is doing very well. They have minimal complaints of pain. Rates pain as a 0-1 out of 10      There is minimal swelling about the Hip. The patient has been doing home physical therapy exercises at home with home therapist.    Has been increasing her walking around the neighborhood notes doing well she is hopeful to begin reduction in some of the restrictions. Pain still on the lateral side of the hip. Wakes her up at night     They deny any calf swelling or numbness or tingling down the leg     Assessment   PHYSICAL EXAM   Vital Signs: There were no vitals filed for this visit. Examination of the hip shows a well-healed incision. Edges are well opposed. There is mild swelling. There is no drainage. Range of motion is up to 110. There is no calf tenderness. The patient is neurovascularly intact. No signs of DVT. Calf nontender  Left leg shorter than the right   Tenderness to trochanteric bursa and along IT band. Gross distal sensation is decreased in the lateral thigh from the area of the incision as expected. RADIOLOGY   X-Rays reviewed: two views of the right hip AP and lateral x-rays of the hip preformed  and reviewed today show excellent alignment of the total hip prosthesis. There is no loosening no fractures noted. Noted leg length discrepancy  With the right being longer than the left        IMPRESSION   6 months  status post right total hip revision    PLAN   The patient is doing well 6 months status post right total hip revision. ICD-10-CM    1.  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 Added shoe lift to the left side   Discussed that PT could be added to help with residual pain   Discussed the patient can slowly reduce restrictions as tolerated in regards to feeling muscular stretching and pulling. They will continue ice and elevation for the hip.     Advised patient to begin returning to work as tolerated  Plan for simple gentle exercises and walking.  -Advised for follow-up in 6 months for re-evaluation   -ordered sed rate and CRP to ensure no infection

## 2022-11-03 ENCOUNTER — HOSPITAL ENCOUNTER (OUTPATIENT)
Age: 52
Discharge: HOME | End: 2022-11-03
Payer: COMMERCIAL

## 2022-11-03 ENCOUNTER — HOSPITAL ENCOUNTER (OUTPATIENT)
Dept: GENERAL RADIOLOGY | Age: 52
Discharge: HOME | End: 2022-11-03
Payer: COMMERCIAL

## 2022-11-03 DIAGNOSIS — M54.16 LUMBAR RADICULITIS: ICD-10-CM

## 2022-11-03 DIAGNOSIS — M51.36 BULGING LUMBAR DISC: ICD-10-CM

## 2022-11-03 PROCEDURE — 72170 X-RAY EXAM OF PELVIS: CPT

## 2022-11-03 PROCEDURE — 72100 X-RAY EXAM L-S SPINE 2/3 VWS: CPT

## 2022-11-03 PROCEDURE — 72070 X-RAY EXAM THORAC SPINE 2VWS: CPT

## (undated) DEVICE — BIPOLAR SEALER 23-112-1 AQM 6.0: Brand: AQUAMANTYS ®

## (undated) DEVICE — NEEDLE HYPO 22GA L1.5IN BLK POLYPR HUB S STL REG BVL STR

## (undated) DEVICE — PREVENA INCISION MANAGEMENT SYSTEM- PEEL & PLACE DRESSING: Brand: PREVENA™ PEEL & PLACE™

## (undated) DEVICE — HANDPIECE SUCTION TUBING INTERPULSE 10FT

## (undated) DEVICE — DECANTER BAG 9": Brand: MEDLINE INDUSTRIES, INC.

## (undated) DEVICE — 3M™ STERI-DRAPE™ INSTRUMENT POUCH 1018: Brand: STERI-DRAPE™

## (undated) DEVICE — CONTAINER,SPECIMEN,PNEU TUBE,3OZ,OR STRL: Brand: MEDLINE

## (undated) DEVICE — SYSTEM SKIN CLSR 22CM DERMBND PRINEO

## (undated) DEVICE — 3M™ TEGADERM™ TRANSPARENT FILM DRESSING FRAME STYLE, 1627, 4 IN X 10 IN (10 CM X 25 CM), 20/CT 4CT/CASE: Brand: 3M™ TEGADERM™

## (undated) DEVICE — PACK PROCEDURE SURG TOT HIP

## (undated) DEVICE — SUTURE MCRYL SZ 0 L18IN ABSRB VLT L36MM CT-1 1/2 CIR Y740D

## (undated) DEVICE — SUTURE STRATAFIX SPRL SZ 1 L14IN ABSRB VLT L48CM CTX 1/2 SXPD2B405

## (undated) DEVICE — APPLICATOR MEDICATED 26 CC SOLUTION HI LT ORNG CHLORAPREP

## (undated) DEVICE — REDAPT LOCKING SCREW 25MM
Type: IMPLANTABLE DEVICE | Site: HIP | Status: NON-FUNCTIONAL
Brand: REDAPT
Removed: 2021-04-18

## (undated) DEVICE — CUFF RESTRN WRST OR ANK 45FT AD FOAM

## (undated) DEVICE — SUTURE MCRYL SZ 2-0 L18IN ABSRB VLT L36MM CT-1 1/2 CIR Y739D

## (undated) DEVICE — COAXIAL HIGH FLOW TIP WITH SOFT SHIELD

## (undated) DEVICE — INTENDED USE FOR SURGICAL MARKING ON INTACT SKIN, ALSO PROVIDES A PERMANENT METHOD OF IDENTIFYING OBJECTS IN THE OPERATING ROOM: Brand: WRITESITE® PLUS MINI PREP RESISTANT MARKER

## (undated) DEVICE — DRAPE C ARM UNIV W41XL74IN CLR PLAS XR VELC CLSR POLY STRP

## (undated) DEVICE — SOLUTION IV IRRIG WATER 1000ML POUR BRL 2F7114

## (undated) DEVICE — COUNTER NDL 40 COUNT HLD 70 NUM FOAM BLK SGL MAG W BLDE REMV

## (undated) DEVICE — SUTURE ETHLN SZ 2-0 L18IN NONABSORBABLE BLK L26MM FS 3/8 664G

## (undated) DEVICE — 1010 S-DRAPE TOWEL DRAPE 10/BX: Brand: STERI-DRAPE™

## (undated) DEVICE — DRESSING THERABOND 3D ANTIMIC CNTCT SYS 15INCHX10INCH

## (undated) DEVICE — KIT EVAC 0.13IN RECT TB DIA10FR 400CC PVC 3 SPR Y CONN DRN

## (undated) DEVICE — SOLUTION IV 1000ML 0.9% SOD CHL

## (undated) DEVICE — SUTURE ETHBND EXCEL SZ 0 L18IN NONABSORBABLE GRN L36MM CT-1 CX21D

## (undated) DEVICE — SUTURE VCRL SZ 0 L18IN ABSRB UD L36MM CT-1 1/2 CIR J840D

## (undated) DEVICE — PEEL-AWAY HOOD: Brand: FLYTE, SURGICOOL

## (undated) DEVICE — SUTURE MCRYL SZ 4-0 L27IN ABSRB UD L19MM PS-2 1/2 CIR PRIM Y426H

## (undated) DEVICE — 450 ML BOTTLE OF 0.05% CHLORHEXIDINE GLUCONATE IN 99.95% STERILE WATER FOR IRRIGATION, USP AND APPLICATOR.: Brand: IRRISEPT ANTIMICROBIAL WOUND LAVAGE

## (undated) DEVICE — SURE SET-DOUBLE BASIN-LF: Brand: MEDLINE INDUSTRIES, INC.

## (undated) DEVICE — 3M™ STERI-DRAPE™ U-DRAPE 1015: Brand: STERI-DRAPE™

## (undated) DEVICE — ORTHO PRE OP PACK: Brand: MEDLINE INDUSTRIES, INC.

## (undated) DEVICE — STANDARD HYPODERMIC NEEDLE,POLYPROPYLENE HUB: Brand: MONOJECT

## (undated) DEVICE — COVER LT HNDL BLU PLAS

## (undated) DEVICE — BLANKET WRM W29.9XL79.1IN UP BODY FORC AIR MISTRAL-AIR

## (undated) DEVICE — PLATE ES AD W 9FT CRD 2

## (undated) DEVICE — GLOVE ORANGE PI 8   MSG9080

## (undated) DEVICE — SUTURE ETHBND EXCEL SZ 2 L30IN NONABSORBABLE GRN L40MM V-37 MX69G

## (undated) DEVICE — YANKAUER,OPEN TIP,W/O VENT,STERILE: Brand: MEDLINE INDUSTRIES, INC.

## (undated) DEVICE — E-Z CLEAN, NON-STICK, PTFE COATED, ELECTROSURGICAL BLADE ELECTRODE, 2.5 INCH (6.35 CM): Brand: EZ CLEAN

## (undated) DEVICE — 3M™ COBAN™ NL STERILE NON-LATEX SELF-ADHERENT WRAP, 2084S, 4 IN X 5 YD (10 CM X 4,5 M), 18 ROLLS/CASE: Brand: 3M™ COBAN™

## (undated) DEVICE — DUAL CUT SAGITTAL BLADE

## (undated) DEVICE — GARMENT,MEDLINE,DVT,INT,CALF,MED, GEN2: Brand: MEDLINE

## (undated) DEVICE — 3M™ TEGADERM™ TRANSPARENT FILM DRESSING FRAME STYLE, 1624W, 2-3/8 IN X 2-3/4 IN (6 CM X 7 CM), 100/CT 4CT/CASE: Brand: 3M™ TEGADERM™

## (undated) DEVICE — REFLECTION SPHERICAL HEAD SCREW 30MM
Type: IMPLANTABLE DEVICE | Site: HIP | Status: NON-FUNCTIONAL
Brand: REFLECTION
Removed: 2021-04-18

## (undated) DEVICE — UNDERGLOVE SURG SZ 8 BLU LTX FREE SYN POLYISOPRENE POLYMER

## (undated) DEVICE — JEWISH HOSPITAL TURNOVER KIT: Brand: MEDLINE INDUSTRIES, INC.

## (undated) DEVICE — REFLECTION FLEXIBLE DRILL 35MM: Brand: REFLECTION

## (undated) DEVICE — DRESSING FOAM DISK DIA1IN H 7MM HYDRPHLC CHG IMPREG IN SL

## (undated) DEVICE — GOWN,SIRUS,POLYRNF,BRTHSLV,XLN/XL,20/CS: Brand: MEDLINE

## (undated) DEVICE — GLOVE SURG SZ 75 CRM LTX FREE POLYISOPRENE POLYMER BEAD ANTI

## (undated) DEVICE — BANDAGE COBAN 4 IN COMPR W4INXL5YD FOAM COHESIVE QUIK STK SELF ADH SFT

## (undated) DEVICE — SPONGE,LAP,18"X18",DLX,XR,ST,5/PK,40/PK: Brand: MEDLINE

## (undated) DEVICE — GLOVE SURG SZ 8 L12IN FNGR THK79MIL GRN LTX FREE